# Patient Record
Sex: FEMALE | Race: BLACK OR AFRICAN AMERICAN | Employment: UNEMPLOYED | URBAN - METROPOLITAN AREA
[De-identification: names, ages, dates, MRNs, and addresses within clinical notes are randomized per-mention and may not be internally consistent; named-entity substitution may affect disease eponyms.]

---

## 2017-01-11 ENCOUNTER — TELEPHONE (OUTPATIENT)
Dept: NEUROLOGY | Age: 42
End: 2017-01-11

## 2017-01-13 RX ORDER — ZOLPIDEM TARTRATE 5 MG/1
5 TABLET ORAL
Qty: 30 TAB | Refills: 1 | Status: SHIPPED | OUTPATIENT
Start: 2017-01-13 | End: 2017-03-20 | Stop reason: SDUPTHER

## 2017-02-20 ENCOUNTER — OFFICE VISIT (OUTPATIENT)
Dept: NEUROLOGY | Age: 42
End: 2017-02-20

## 2017-02-20 VITALS
DIASTOLIC BLOOD PRESSURE: 70 MMHG | SYSTOLIC BLOOD PRESSURE: 130 MMHG | OXYGEN SATURATION: 98 % | RESPIRATION RATE: 18 BRPM | HEART RATE: 80 BPM

## 2017-02-20 DIAGNOSIS — G43.709 CHRONIC MIGRAINE W/O AURA W/O STATUS MIGRAINOSUS, NOT INTRACTABLE: Primary | ICD-10-CM

## 2017-02-20 DIAGNOSIS — N95.1 PERIMENOPAUSAL SYMPTOMS: ICD-10-CM

## 2017-02-20 RX ORDER — ONDANSETRON 4 MG/1
4 TABLET, ORALLY DISINTEGRATING ORAL
Qty: 30 TAB | Refills: 1 | Status: SHIPPED | OUTPATIENT
Start: 2017-02-20 | End: 2018-09-10 | Stop reason: ALTCHOICE

## 2017-02-20 RX ORDER — NORTRIPTYLINE HYDROCHLORIDE 25 MG/1
50 CAPSULE ORAL
Qty: 60 CAP | Refills: 5 | Status: SHIPPED | OUTPATIENT
Start: 2017-02-20 | End: 2017-06-05 | Stop reason: SDUPTHER

## 2017-02-20 RX ORDER — SUMATRIPTAN 50 MG/1
50 TABLET, FILM COATED ORAL
Qty: 9 TAB | Refills: 3 | Status: SHIPPED | OUTPATIENT
Start: 2017-02-20 | End: 2018-09-10 | Stop reason: ALTCHOICE

## 2017-02-20 NOTE — PROGRESS NOTES
Chief Complaint   Patient presents with    Headache       HPI    78-year-old woman with chronic migraine and perimenopausal headaches here to follow-up. Since her last visit she has been more compliant with nortriptyline 25 mg but still is waking up in the middle the night. Headaches are less intense but still very prominent around her cycle. She has to take Naprosyn daily during her menstrual cycle which is about 5 days. Review of Systems   Neurological: Positive for headaches. Psychiatric/Behavioral: The patient has insomnia. All other systems reviewed and are negative. Past Medical History   Diagnosis Date    Anxiety disorder     Arthritis     Carpal tunnel syndrome on both sides 2009    Headache     Migraines      Family History   Problem Relation Age of Onset    Stroke Father     Migraines Maternal Grandmother     Headache Maternal Grandmother      Social History     Social History    Marital status:      Spouse name: N/A    Number of children: N/A    Years of education: N/A     Occupational History    Not on file. Social History Main Topics    Smoking status: Current Some Day Smoker     Packs/day: 0.50    Smokeless tobacco: Not on file    Alcohol use No    Drug use: No    Sexual activity: Yes     Birth control/ protection: Injection     Other Topics Concern    Not on file     Social History Narrative     No Known Allergies      Current Outpatient Prescriptions   Medication Sig    nortriptyline (PAMELOR) 25 mg capsule Take 2 Caps by mouth nightly. Take in evening    SUMAtriptan (IMITREX) 50 mg tablet Take 1 Tab by mouth once as needed for Migraine for up to 1 dose. AT ONSET OF SEVERE HEADACHE    ondansetron (ZOFRAN ODT) 4 mg disintegrating tablet Take 1 Tab by mouth every eight (8) hours as needed for Nausea (with headache).  TALE WITH IMITREX    SUMAtriptan succinate (ZEMBRACE SYMTOUCH) 3 mg/0.5 mL pnij 1 Units by SubCUTAneous route once as needed for up to 1 dose.  zolpidem (AMBIEN) 5 mg tablet Take 1 Tab by mouth nightly as needed for Sleep. Max Daily Amount: 5 mg.  naproxen (NAPROSYN) 500 mg tablet Take 1 Tab by mouth daily as needed. TAKE FOR 5 DAYS DURING MENSTRUAL CYCLE     No current facility-administered medications for this visit. Neurologic Exam     Mental Status        WD/WN adult in NAD, normal grooming  VSS  A&O x 3    PERRL, nonicteric  Face is symmetric, tongue midline  Speech is fluent and clear  No limb ataxia. No abnl movements. Moving all extemities spontaneously and symmetric  Normal gait    CVS RRR  Lungs nonlabored  Skin is warm and dry         Visit Vitals    /70    Pulse 80    Resp 18    SpO2 98%       Assessment and Plan   Gennaro Xie was seen today for headache. Diagnoses and all orders for this visit:    Chronic migraine w/o aura w/o status migrainosus, not intractable    Perimenopausal symptoms    Other orders  -     nortriptyline (PAMELOR) 25 mg capsule; Take 2 Caps by mouth nightly. Take in evening  -     SUMAtriptan (IMITREX) 50 mg tablet; Take 1 Tab by mouth once as needed for Migraine for up to 1 dose. AT ONSET OF SEVERE HEADACHE  -     ondansetron (ZOFRAN ODT) 4 mg disintegrating tablet; Take 1 Tab by mouth every eight (8) hours as needed for Nausea (with headache). TALE WITH IMITREX  -     SUMAtriptan succinate (ZEMBRACE SYMTOUCH) 3 mg/0.5 mL pnij; 1 Units by SubCUTAneous route once as needed for up to 1 dose. 68-year-old woman with chronic migraine headache. Still having poorly sustained sleep. We will increase nortriptyline to 50 mg as a trial.  Continue Imitrex but we will add subcutaneous version for quicker benefit. She did not tolerate magnesium due to rash. That has been discontinued. Follow-up in 3 months.       [address] [facility], [address]  Diplomate ROSSYN

## 2017-02-20 NOTE — COMMUNICATION BODY
Chief Complaint   Patient presents with    Headache       HPI    76-year-old woman with chronic migraine and perimenopausal headaches here to follow-up. Since her last visit she has been more compliant with nortriptyline 25 mg but still is waking up in the middle the night. Headaches are less intense but still very prominent around her cycle. She has to take Naprosyn daily during her menstrual cycle which is about 5 days. Review of Systems   Neurological: Positive for headaches. Psychiatric/Behavioral: The patient has insomnia. All other systems reviewed and are negative. Past Medical History   Diagnosis Date    Anxiety disorder     Arthritis     Carpal tunnel syndrome on both sides 2009    Headache     Migraines      Family History   Problem Relation Age of Onset    Stroke Father     Migraines Maternal Grandmother     Headache Maternal Grandmother      Social History     Social History    Marital status:      Spouse name: N/A    Number of children: N/A    Years of education: N/A     Occupational History    Not on file. Social History Main Topics    Smoking status: Current Some Day Smoker     Packs/day: 0.50    Smokeless tobacco: Not on file    Alcohol use No    Drug use: No    Sexual activity: Yes     Birth control/ protection: Injection     Other Topics Concern    Not on file     Social History Narrative     No Known Allergies      Current Outpatient Prescriptions   Medication Sig    nortriptyline (PAMELOR) 25 mg capsule Take 2 Caps by mouth nightly. Take in evening    SUMAtriptan (IMITREX) 50 mg tablet Take 1 Tab by mouth once as needed for Migraine for up to 1 dose. AT ONSET OF SEVERE HEADACHE    ondansetron (ZOFRAN ODT) 4 mg disintegrating tablet Take 1 Tab by mouth every eight (8) hours as needed for Nausea (with headache).  TALE WITH IMITREX    SUMAtriptan succinate (ZEMBRACE SYMTOUCH) 3 mg/0.5 mL pnij 1 Units by SubCUTAneous route once as needed for up to 1 dose.  zolpidem (AMBIEN) 5 mg tablet Take 1 Tab by mouth nightly as needed for Sleep. Max Daily Amount: 5 mg.  naproxen (NAPROSYN) 500 mg tablet Take 1 Tab by mouth daily as needed. TAKE FOR 5 DAYS DURING MENSTRUAL CYCLE     No current facility-administered medications for this visit. Neurologic Exam     Mental Status        WD/WN adult in NAD, normal grooming  VSS  A&O x 3    PERRL, nonicteric  Face is symmetric, tongue midline  Speech is fluent and clear  No limb ataxia. No abnl movements. Moving all extemities spontaneously and symmetric  Normal gait    CVS RRR  Lungs nonlabored  Skin is warm and dry         Visit Vitals    /70    Pulse 80    Resp 18    SpO2 98%       Assessment and Plan   Gina Floor was seen today for headache. Diagnoses and all orders for this visit:    Chronic migraine w/o aura w/o status migrainosus, not intractable    Perimenopausal symptoms    Other orders  -     nortriptyline (PAMELOR) 25 mg capsule; Take 2 Caps by mouth nightly. Take in evening  -     SUMAtriptan (IMITREX) 50 mg tablet; Take 1 Tab by mouth once as needed for Migraine for up to 1 dose. AT ONSET OF SEVERE HEADACHE  -     ondansetron (ZOFRAN ODT) 4 mg disintegrating tablet; Take 1 Tab by mouth every eight (8) hours as needed for Nausea (with headache). TALE WITH IMITREX  -     SUMAtriptan succinate (ZEMBRACE SYMTOUCH) 3 mg/0.5 mL pnij; 1 Units by SubCUTAneous route once as needed for up to 1 dose. 68-year-old woman with chronic migraine headache. Still having poorly sustained sleep. We will increase nortriptyline to 50 mg as a trial.  Continue Imitrex but we will add subcutaneous version for quicker benefit. She did not tolerate magnesium due to rash. That has been discontinued. Follow-up in 3 months.       812 [facility], [address]  Diplomate ROSSYN

## 2017-02-20 NOTE — LETTER
2/20/2017 Patient:  [facility] YOB: 1975 Date of Visit: 2/20/2017 Dear Gerhardt Anthony, [address] [place] [address] [employer] [id number] VIA Facsimile: [phone] [place]anes I was requested by Gerhardt Anthony, PA to evaluate Ms. [facility]  for Chief Complaint Patient presents with  
 Headache Flavia Gary I am recommending the following:  
 
Veronica Kelly was seen today for headache. Diagnoses and all orders for this visit: 
 
Chronic migraine w/o aura w/o status migrainosus, not intractable Perimenopausal symptoms Other orders 
-     nortriptyline (PAMELOR) 25 mg capsule; Take 2 Caps by mouth nightly. Take in evening 
-     SUMAtriptan (IMITREX) 50 mg tablet; Take 1 Tab by mouth once as needed for Migraine for up to 1 dose. AT ONSET OF SEVERE HEADACHE 
-     ondansetron (ZOFRAN ODT) 4 mg disintegrating tablet; Take 1 Tab by mouth every eight (8) hours as needed for Nausea (with headache). TALE WITH IMITREX 
-     SUMAtriptan succinate (ZEMBRACE SYMTOUCH) 3 mg/0.5 mL pnij; 1 Units by SubCUTAneous route once as needed for up to 1 dose. 
 
 
 
---------------------------------------------------------------------------------------------------------------------- Below is my encounter: Chief Complaint Patient presents with  
 Headache HPI 
 
39-year-old woman with chronic migraine and perimenopausal headaches here to follow-up. Since her last visit she has been more compliant with nortriptyline 25 mg but still is waking up in the middle the night. Headaches are less intense but still very prominent around her cycle. She has to take Naprosyn daily during her menstrual cycle which is about 5 days. Review of Systems Neurological: Positive for headaches. Psychiatric/Behavioral: The patient has insomnia. All other systems reviewed and are negative. Past Medical History Diagnosis Date  Anxiety disorder  Arthritis  Carpal tunnel syndrome on both sides 2009  
 Headache  Migraines Family History Problem Relation Age of Onset  Stroke Father  Migraines Maternal Grandmother  Headache Maternal Grandmother Social History Social History  Marital status:  Spouse name: N/A  
 Number of children: N/A  
 Years of education: N/A Occupational History  Not on file. Social History Main Topics  Smoking status: Current Some Day Smoker Packs/day: 0.50  Smokeless tobacco: Not on file  Alcohol use No  
 Drug use: No  
 Sexual activity: Yes Birth control/ protection: Injection Other Topics Concern  Not on file Social History Narrative No Known Allergies Current Outpatient Prescriptions Medication Sig  
 nortriptyline (PAMELOR) 25 mg capsule Take 2 Caps by mouth nightly. Take in evening  SUMAtriptan (IMITREX) 50 mg tablet Take 1 Tab by mouth once as needed for Migraine for up to 1 dose. AT ONSET OF SEVERE HEADACHE  ondansetron (ZOFRAN ODT) 4 mg disintegrating tablet Take 1 Tab by mouth every eight (8) hours as needed for Nausea (with headache). TALE WITH IMITREX  
 SUMAtriptan succinate (ZEMBRACE SYMTOUCH) 3 mg/0.5 mL pnij 1 Units by SubCUTAneous route once as needed for up to 1 dose.  zolpidem (AMBIEN) 5 mg tablet Take 1 Tab by mouth nightly as needed for Sleep. Max Daily Amount: 5 mg.  naproxen (NAPROSYN) 500 mg tablet Take 1 Tab by mouth daily as needed. TAKE FOR 5 DAYS DURING MENSTRUAL CYCLE No current facility-administered medications for this visit. Neurologic Exam  
 
Mental Status WD/WN adult in NAD, normal grooming VSS 
A&O x 3 PERRL, nonicteric Face is symmetric, tongue midline Speech is fluent and clear No limb ataxia. No abnl movements. Moving all extemities spontaneously and symmetric Normal gait CVS RRR Lungs nonlabored Skin is warm and dry Visit Vitals  /70  Pulse 80  Resp 18  SpO2 98% Assessment and Plan Susan Taylor was seen today for headache. Diagnoses and all orders for this visit: 
 
Chronic migraine w/o aura w/o status migrainosus, not intractable Perimenopausal symptoms Other orders 
-     nortriptyline (PAMELOR) 25 mg capsule; Take 2 Caps by mouth nightly. Take in evening 
-     SUMAtriptan (IMITREX) 50 mg tablet; Take 1 Tab by mouth once as needed for Migraine for up to 1 dose. AT ONSET OF SEVERE HEADACHE 
-     ondansetron (ZOFRAN ODT) 4 mg disintegrating tablet; Take 1 Tab by mouth every eight (8) hours as needed for Nausea (with headache). TALE WITH IMITREX 
-     SUMAtriptan succinate (ZEMBRACE SYMTOUCH) 3 mg/0.5 mL pnij; 1 Units by SubCUTAneous route once as needed for up to 1 dose. 66-year-old woman with chronic migraine headache. Still having poorly sustained sleep. We will increase nortriptyline to 50 mg as a trial.  Continue Imitrex but we will add subcutaneous version for quicker benefit. She did not tolerate magnesium due to rash. That has been discontinued. Follow-up in 3 months. Thank you for giving me the opportunity to assist in the care of Ms. Cardoza. If you have questions, please do not hesitate to contact me. Sincerely, [address] [facility], DO Neurologist 
Diplomate VAHID

## 2017-02-20 NOTE — MR AVS SNAPSHOT
Visit Information Date & Time Provider Department Dept. Phone Encounter #  
 2/20/2017 10:00  [facility] at [facility] [id number] Follow-up Instructions Return in about 3 months (around 5/20/2017). Upcoming Health Maintenance Date Due Pneumococcal 19-64 Medium Risk (1 of 1 - PPSV23) 3/11/1992 DTaP/Tdap/Td series (1 - Tdap) 3/11/1994 PAP AKA CERVICAL CYTOLOGY 3/11/1994 INFLUENZA AGE 9 TO ADULT 8/1/2016 Allergies as of 2/20/2017  Review Complete On: 2/20/2017 By: Ramesh Taylor LPN No Known Allergies Current Immunizations  Never Reviewed No immunizations on file. Not reviewed this visit You Were Diagnosed With   
  
 Codes Comments Chronic migraine w/o aura w/o status migrainosus, not intractable    -  Primary ICD-10-CM: Y70.968 ICD-9-CM: 346.70 Perimenopausal symptoms     ICD-10-CM: N95.1 ICD-9-CM: 627.2 Vitals BP Pulse Resp SpO2 Smoking Status 130/70 80 18 98% Current Some Day Smoker Preferred Pharmacy Pharmacy Name Phone [id number], [id number] [place] [address] [phone] Your Updated Medication List  
  
   
This list is accurate as of: 2/20/17 10:15 AM.  Always use your most recent med list.  
  
  
  
  
 naproxen 500 mg tablet Commonly known as:  NAPROSYN Take 1 Tab by mouth daily as needed. TAKE FOR 5 DAYS DURING MENSTRUAL CYCLE  
  
 nortriptyline 25 mg capsule Commonly known as:  PAMELOR Take 2 Caps by mouth nightly. Take in evening  
  
 ondansetron 4 mg disintegrating tablet Commonly known as:  ZOFRAN ODT Take 1 Tab by mouth every eight (8) hours as needed for Nausea (with headache). TALE WITH IMITREX  
  
 * SUMAtriptan 50 mg tablet Commonly known as:  IMITREX Take 1 Tab by mouth once as needed for Migraine for up to 1 dose. AT ONSET OF SEVERE HEADACHE * SUMAtriptan succinate 3 mg/0.5 mL Pnij Commonly known as:  Catalino Clamp  
1 Units by SubCUTAneous route once as needed for up to 1 dose. zolpidem 5 mg tablet Commonly known as:  AMBIEN Take 1 Tab by mouth nightly as needed for Sleep. Max Daily Amount: 5 mg.  
  
 * Notice: This list has 2 medication(s) that are the same as other medications prescribed for you. Read the directions carefully, and ask your doctor or other care provider to review them with you. Prescriptions Printed Refills SUMAtriptan succinate (ZEMBRACE SYMTOUCH) 3 mg/0.5 mL pnij 3 Si Units by SubCUTAneous route once as needed for up to 1 dose. Class: Print Route: SubCUTAneous Prescriptions Sent to Pharmacy Refills  
 nortriptyline (PAMELOR) 25 mg capsule 5 Sig: Take 2 Caps by mouth nightly. Take in evening Class: Normal  
 Pharmacy: [address] [place] [address] [address] [place] [address] Ph #: [phone] Route: Oral  
 SUMAtriptan (IMITREX) 50 mg tablet 3 Sig: Take 1 Tab by mouth once as needed for Migraine for up to 1 dose. AT ONSET OF SEVERE HEADACHE Class: Normal  
 Pharmacy: [address] [place] [address] [address] [place] [address] Ph #: [phone] Route: Oral  
 ondansetron (ZOFRAN ODT) 4 mg disintegrating tablet 1 Sig: Take 1 Tab by mouth every eight (8) hours as needed for Nausea (with headache). [address] Class: Normal  
 Pharmacy: [address] [place] [address] [address] [place] [address] Ph #: [phone] Route: Oral  
  
Follow-up Instructions Return in about 3 months (around 2017). Patient Instructions PRESCRIPTION REFILL POLICY [facility] Statement to Patients 2014 In an effort to ensure the large volume of patient prescription refills is processed in the most efficient and expeditious manner, we are asking our patients to assist us by calling your Pharmacy for all prescription refills, this will include also your  Mail Order Pharmacy. The pharmacy will contact our office electronically to continue the refill process. Please do not wait until the last minute to call your pharmacy. We need at least 48 hours (2days) to fill prescriptions. We also encourage you to call your pharmacy before going to  your prescription to make sure it is ready. With regard to controlled substance prescription refill requests (narcotic refills) that need to be picked up at our office, we ask your cooperation by providing us with at least 72 hours (3days) notice that you will need a refill. We will not refill narcotic prescription refill requests after 4:00pm on any weekday, Monday through Thursday, or after 2:00pm on Fridays, or on the weekends. We encourage everyone to explore another way of getting your prescription refill request processed using [employer], our patient web portal through our electronic medical record system. [employer] is an efficient and effective way to communicate your medication request directly to the office and  downloadable as an andree on your smart phone . [employer] also features a review functionality that allows you to view your medication list as well as leave messages for your physician. Are you ready to get connected? If so please review the attatched instructions or speak to any of our staff to get you set up right away! Thank you so much for your cooperation. Should you have any questions please contact our Practice Administrator. The Physicians and Staff,  [facility] Thank you for choosing [facility] for your  
 
care. You may receive a survey about your visit. We appreciate you taking time  
 
to complete this survey as we use your feedback to improve our services.   We  
 
 realize we are not perfect, but we strive to provide excellent care. Please provide this summary of care documentation to your next provider. Your primary care clinician is listed as Valdez Freedman. If you have any questions after today's visit, please call [phone].

## 2017-02-20 NOTE — PATIENT INSTRUCTIONS
10 [facility]   Statement to Patients  April 1, 2014      In an effort to ensure the large volume of patient prescription refills is processed in the most efficient and expeditious manner, we are asking our patients to assist us by calling your Pharmacy for all prescription refills, this will include also your  Mail Order Pharmacy. The pharmacy will contact our office electronically to continue the refill process. Please do not wait until the last minute to call your pharmacy. We need at least 48 hours (2days) to fill prescriptions. We also encourage you to call your pharmacy before going to  your prescription to make sure it is ready. With regard to controlled substance prescription refill requests (narcotic refills) that need to be picked up at our office, we ask your cooperation by providing us with at least 72 hours (3days) notice that you will need a refill. We will not refill narcotic prescription refill requests after 4:00pm on any weekday, Monday through Thursday, or after 2:00pm on Fridays, or on the weekends. We encourage everyone to explore another way of getting your prescription refill request processed using [employer], our patient web portal through our electronic medical record system. [employer] is an efficient and effective way to communicate your medication request directly to the office and  downloadable as an andree on your smart phone . [employer] also features a review functionality that allows you to view your medication list as well as leave messages for your physician. Are you ready to get connected? If so please review the attatched instructions or speak to any of our staff to get you set up right away! Thank you so much for your cooperation. Should you have any questions please contact our Practice Administrator.     The Physicians and Staff,  [facility]     Thank you for choosing [facility] for your     care. You may receive a survey about your visit. We appreciate you taking time     to complete this survey as we use your feedback to improve our services. We     realize we are not perfect, but we strive to provide excellent care.

## 2017-03-21 RX ORDER — ZOLPIDEM TARTRATE 5 MG/1
TABLET ORAL
Qty: 30 TAB | Refills: 1 | Status: SHIPPED | OUTPATIENT
Start: 2017-03-21 | End: 2018-09-10 | Stop reason: ALTCHOICE

## 2017-06-05 ENCOUNTER — OFFICE VISIT (OUTPATIENT)
Dept: NEUROLOGY | Age: 42
End: 2017-06-05

## 2017-06-05 VITALS
OXYGEN SATURATION: 98 % | SYSTOLIC BLOOD PRESSURE: 138 MMHG | RESPIRATION RATE: 18 BRPM | WEIGHT: 128 LBS | BODY MASS INDEX: 25 KG/M2 | DIASTOLIC BLOOD PRESSURE: 72 MMHG | HEART RATE: 82 BPM

## 2017-06-05 DIAGNOSIS — F32.9 REACTIVE DEPRESSION (SITUATIONAL): ICD-10-CM

## 2017-06-05 DIAGNOSIS — G43.709 CHRONIC MIGRAINE W/O AURA W/O STATUS MIGRAINOSUS, NOT INTRACTABLE: Primary | ICD-10-CM

## 2017-06-05 RX ORDER — LORAZEPAM 0.5 MG/1
0.5 TABLET ORAL
Qty: 20 TAB | Refills: 0 | Status: SHIPPED | OUTPATIENT
Start: 2017-06-05 | End: 2018-09-10 | Stop reason: ALTCHOICE

## 2017-06-05 RX ORDER — NORTRIPTYLINE HYDROCHLORIDE 50 MG/1
50 CAPSULE ORAL
Qty: 30 CAP | Refills: 3 | Status: SHIPPED | OUTPATIENT
Start: 2017-06-05 | End: 2018-08-13

## 2017-06-05 NOTE — MR AVS SNAPSHOT
Visit Information Date & Time Provider Department Dept. Phone Encounter #  
 6/5/2017  1:15  [facility], [address] [facility] at [address]  Upcoming Health Maintenance Date Due Pneumococcal 19-64 Medium Risk (1 of 1 - PPSV23) 3/11/1992 DTaP/Tdap/Td series (1 - Tdap) 3/11/1994 PAP AKA CERVICAL CYTOLOGY 3/11/1994 INFLUENZA AGE 9 TO ADULT 8/1/2017 Allergies as of 6/5/2017  Review Complete On: 2/20/2017 By: [address] [facility], DO No Known Allergies Current Immunizations  Never Reviewed No immunizations on file. Not reviewed this visit You Were Diagnosed With   
  
 Codes Comments Chronic migraine w/o aura w/o status migrainosus, not intractable    -  Primary ICD-10-CM: L99.829 ICD-9-CM: 346.70 Reactive depression (situational)     ICD-10-CM: F32.9 ICD-9-CM: 300.4 Vitals BP Pulse Resp Weight(growth percentile) SpO2 BMI  
 138/72 82 18 128 lb (58.1 kg) 98% 25 kg/m2 Smoking Status Current Some Day Smoker BMI and BSA Data Body Mass Index Body Surface Area  
 25 kg/m 2 1.57 m 2 Preferred Pharmacy Pharmacy Name Phone [id number], [place] [address] [address] [address] [phone] Your Updated Medication List  
  
   
This list is accurate as of: 6/5/17  1:58 PM.  Always use your most recent med list.  
  
  
  
  
 LORazepam 0.5 mg tablet Commonly known as:  ATIVAN Take 1 Tab by mouth nightly as needed for Anxiety. Max Daily Amount: 0.5 mg.  
  
 naproxen 500 mg tablet Commonly known as:  NAPROSYN Take 1 Tab by mouth daily as needed. TAKE FOR 5 DAYS DURING MENSTRUAL CYCLE  
  
 nortriptyline 50 mg capsule Commonly known as:  PAMELOR Take 1 Cap by mouth nightly. Take in evening  
  
 ondansetron 4 mg disintegrating tablet Commonly known as:  ZOFRAN ODT  
 Take 1 Tab by mouth every eight (8) hours as needed for Nausea (with headache). TALE WITH IMITREX  
  
 * SUMAtriptan 50 mg tablet Commonly known as:  IMITREX Take 1 Tab by mouth once as needed for Migraine for up to 1 dose. AT ONSET OF SEVERE HEADACHE * SUMAtriptan succinate 3 mg/0.5 mL Pnij Commonly known as:  Duey Massed  
1 Units by SubCUTAneous route once as needed for up to 1 dose. * SUMAtriptan succinate 3 mg/0.5 mL Pnij Commonly known as:  Duey Massed  
3 mg by SubCUTAneous route as needed. zolpidem 5 mg tablet Commonly known as:  AMBIEN  
TAKE 1 TABLET BY MOUTH NIGHTLY AT BEDTIME AS NEEDED FOR SLEEP, *MAX DAILY AMOUNT: 5MG*  
  
 * Notice: This list has 3 medication(s) that are the same as other medications prescribed for you. Read the directions carefully, and ask your doctor or other care provider to review them with you. Prescriptions Printed Refills LORazepam (ATIVAN) 0.5 mg tablet 0 Sig: Take 1 Tab by mouth nightly as needed for Anxiety. Max Daily Amount: 0.5 mg.  
 Class: Print Route: Oral  
  
Prescriptions Sent to Pharmacy Refills  
 nortriptyline (PAMELOR) 50 mg capsule 3 Sig: Take 1 Cap by mouth nightly. Take in evening Class: Normal  
 Pharmacy: [address] [place] [address] , [address] [place] [address] Ph #: [phone] Route: Oral  
  
Patient Instructions A Healthy Lifestyle: Care Instructions Your Care Instructions A healthy lifestyle can help you feel good, stay at a healthy weight, and have plenty of energy for both work and play. A healthy lifestyle is something you can share with your whole family. A healthy lifestyle also can lower your risk for serious health problems, such as high blood pressure, heart disease, and diabetes. You can follow a few steps listed below to improve your health and the health of your family. Follow-up care is a key part of your treatment and safety. Be sure to make and go to all appointments, and call your doctor if you are having problems. Its also a good idea to know your test results and keep a list of the medicines you take. How can you care for yourself at home? · Do not eat too much sugar, fat, or fast foods. You can still have dessert and treats now and then. The goal is moderation. · Start small to improve your eating habits. Pay attention to portion sizes, drink less juice and soda pop, and eat more fruits and vegetables. ¨ Eat a healthy amount of food. A 3-ounce serving of meat, for example, is about the size of a deck of cards. Fill the rest of your plate with vegetables and whole grains. ¨ Limit the amount of soda and sports drinks you have every day. Drink more water when you are thirsty. ¨ Eat at least 5 servings of fruits and vegetables every day. It may seem like a lot, but it is not hard to reach this goal. A serving or helping is 1 piece of fruit, 1 cup of vegetables, or 2 cups of leafy, raw vegetables. Have an apple or some carrot sticks as an afternoon snack instead of a candy bar. Try to have fruits and/or vegetables at every meal. 
· Make exercise part of your daily routine. You may want to start with simple activities, such as walking, bicycling, or slow swimming. Try to be active 30 to 60 minutes every day. You do not need to do all 30 to 60 minutes all at once. For example, you can exercise 3 times a day for 10 or 20 minutes. Moderate exercise is safe for most people, but it is always a good idea to talk to your doctor before starting an exercise program. 
· Keep moving. Concha Dung the lawn, work in the garden, or [employer]. Take the stairs instead of the elevator at work. · If you smoke, quit. People who smoke have an increased risk for heart attack, stroke, cancer, and other lung illnesses.  Quitting is hard, but there are ways to boost your chance of quitting tobacco for good. ¨ Use nicotine gum, patches, or lozenges. ¨ Ask your doctor about stop-smoking programs and medicines. ¨ Keep trying. In addition to reducing your risk of diseases in the future, you will notice some benefits soon after you stop using tobacco. If you have shortness of breath or asthma symptoms, they will likely get better within a few weeks after you quit. · Limit how much alcohol you drink. Moderate amounts of alcohol (up to 2 drinks a day for men, 1 drink a day for women) are okay. But drinking too much can lead to liver problems, high blood pressure, and other health problems. Family health If you have a family, there are many things you can do together to improve your health. · Eat meals together as a family as often as possible. · Eat healthy foods. This includes fruits, vegetables, lean meats and dairy, and whole grains. · Include your family in your fitness plan. Most people think of activities such as jogging or tennis as the way to fitness, but there are many ways you and your family can be more active. Anything that makes you breathe hard and gets your heart pumping is exercise. Here are some tips: 
¨ Walk to do errands or to take your child to school or the bus. ¨ Go for a family bike ride after dinner instead of watching TV. Where can you learn more? Go to http://masha-briseyda.info/. Enter Z020 in the search box to learn more about \"A Healthy Lifestyle: Care Instructions. \" Current as of: July 26, 2016 Content Version: 11.2 © 6245-6163 [employer]. Care instructions adapted under license by [employer] (which disclaims liability or warranty for this information). If you have questions about a medical condition or this instruction, always ask your healthcare professional. [address] any warranty or liability for your use of this information. Introducing [facility]! [facility] introduces [employer] patient portal. Now you can access parts of your medical record, email your doctor's office, and request medication refills online. 1. In your internet browser, go to [email or web address] 2. Click on the First Time User? Click Here link in the Sign In box. You will see the New Member Sign Up page. 3. Enter your [employer] Access Code exactly as it appears below. You will not need to use this code after youve completed the sign-up process. If you do not sign up before the expiration date, you must request a new code. · [employer] Access Code: [email or web address] Expires: 9/3/2017  1:18 PM 
 
4. Enter the last four digits of your Social Security Number (xxxx) and Date of Birth (mm/dd/yyyy) as indicated and click Submit. You will be taken to the next sign-up page. 5. Create a [employer] ID. This will be your [employer] login ID and cannot be changed, so think of one that is secure and easy to remember. 6. Create a [employer] password. You can change your password at any time. 7. Enter your Password Reset Question and Answer. This can be used at a later time if you forget your password. 8. Enter your e-mail address. You will receive e-mail notification when new information is available in [address] 9. Click Sign Up. You can now view and download portions of your medical record. 10. Click the Download Summary menu link to download a portable copy of your medical information. If you have questions, please visit the Frequently Asked Questions section of the [employer] website. Remember, [employer] is NOT to be used for urgent needs. For medical emergencies, dial 911. Now available from your iPhone and Android! Please provide this summary of care documentation to your next provider. Your primary care clinician is listed as Natalee Lilly. If you have any questions after today's visit, please call [phone].

## 2017-06-05 NOTE — PROGRESS NOTES
Chief Complaint   Patient presents with    Headache       HPI    Ms. Blair Zaragoza is a 64-year-old woman here to follow-up on chronic migraine. Last visit she was responding to nortriptyline very adequately. Unfortunately since her last visit she was diagnosed with HSV-1 and HSV-2. She is very upset over this. Having a lot of depression and guilt over this. As a result her headaches have slightly increased in intensity and flare. She used sumatriptan injection with good relief. Review of Systems   Neurological: Positive for headaches. Psychiatric/Behavioral: Positive for depression. The patient is nervous/anxious and has insomnia. All other systems reviewed and are negative. Past Medical History:   Diagnosis Date    Anxiety disorder     Arthritis     Carpal tunnel syndrome on both sides 2009    Headache     Migraines      Family History   Problem Relation Age of Onset    Stroke Father     Migraines Maternal Grandmother     Headache Maternal Grandmother      Social History     Social History    Marital status:      Spouse name: N/A    Number of children: N/A    Years of education: N/A     Occupational History    Not on file. Social History Main Topics    Smoking status: Current Some Day Smoker     Packs/day: 0.50    Smokeless tobacco: Not on file    Alcohol use No    Drug use: No    Sexual activity: Yes     Birth control/ protection: Injection     Other Topics Concern    Not on file     Social History Narrative     No Known Allergies      Current Outpatient Prescriptions   Medication Sig    LORazepam (ATIVAN) 0.5 mg tablet Take 1 Tab by mouth nightly as needed for Anxiety. Max Daily Amount: 0.5 mg.    nortriptyline (PAMELOR) 50 mg capsule Take 1 Cap by mouth nightly. Take in evening    SUMAtriptan succinate (ZEMBRACE SYMTOUCH) 3 mg/0.5 mL pnij 3 mg by SubCUTAneous route as needed.     zolpidem (AMBIEN) 5 mg tablet TAKE 1 TABLET BY MOUTH NIGHTLY AT BEDTIME AS NEEDED FOR SLEEP, *MAX DAILY AMOUNT: 5MG*    naproxen (NAPROSYN) 500 mg tablet Take 1 Tab by mouth daily as needed. TAKE FOR 5 DAYS DURING MENSTRUAL CYCLE    SUMAtriptan (IMITREX) 50 mg tablet Take 1 Tab by mouth once as needed for Migraine for up to 1 dose. AT ONSET OF SEVERE HEADACHE    ondansetron (ZOFRAN ODT) 4 mg disintegrating tablet Take 1 Tab by mouth every eight (8) hours as needed for Nausea (with headache). TALE WITH IMITREX    SUMAtriptan succinate (ZEMBRACE SYMTOUCH) 3 mg/0.5 mL pnij 1 Units by SubCUTAneous route once as needed for up to 1 dose. No current facility-administered medications for this visit. Neurologic Exam     Mental Status        WD/WN adult in NAD, normal grooming  VSS  A&O x 3. Tearful,     PERRL, nonicteric  Face is symmetric, tongue midline  Speech is fluent and clear  No limb ataxia. No abnl movements. Moving all extemities spontaneously and symmetric  Normal gait    CVS RRR  Lungs nonlabored  Skin is warm and dry         Visit Vitals    /72    Pulse 82    Resp 18    Wt 58.1 kg (128 lb)    SpO2 98%    BMI 25 kg/m2       Assessment and Plan   Namrata Zafar was seen today for headache. Diagnoses and all orders for this visit:    Chronic migraine w/o aura w/o status migrainosus, not intractable    Reactive depression (situational)    Other orders  -     LORazepam (ATIVAN) 0.5 mg tablet; Take 1 Tab by mouth nightly as needed for Anxiety. Max Daily Amount: 0.5 mg.  -     nortriptyline (PAMELOR) 50 mg capsule; Take 1 Cap by mouth nightly. Take in evening     49-year-old woman with chronic migraine who has been responding to nortriptyline. Unfortunately she is having an increase in headaches probably in the setting of reactive depression due to her recent diagnosis of HSV 1 and 2. I am going to give her a short course of Ativan for this difficult  Time. Not for daily use. She understands this. Stop using Ambien since it was not working. Continue nortriptyline.   She is not symptomatic currently regarding the HSV. Follow-up in 3 months or sooner as needed.           [facility] [address]  Diplomate ABPN

## 2017-06-05 NOTE — PATIENT INSTRUCTIONS

## 2018-08-13 ENCOUNTER — OFFICE VISIT (OUTPATIENT)
Dept: INTERNAL MEDICINE CLINIC | Age: 43
End: 2018-08-13

## 2018-08-13 VITALS
SYSTOLIC BLOOD PRESSURE: 120 MMHG | HEIGHT: 59 IN | OXYGEN SATURATION: 100 % | TEMPERATURE: 96 F | DIASTOLIC BLOOD PRESSURE: 85 MMHG | WEIGHT: 131.2 LBS | BODY MASS INDEX: 26.45 KG/M2 | RESPIRATION RATE: 18 BRPM | HEART RATE: 83 BPM

## 2018-08-13 DIAGNOSIS — Z76.89 ENCOUNTER TO ESTABLISH CARE WITH NEW DOCTOR: ICD-10-CM

## 2018-08-13 DIAGNOSIS — F32.A ANXIETY AND DEPRESSION: Primary | ICD-10-CM

## 2018-08-13 DIAGNOSIS — F41.9 ANXIETY AND DEPRESSION: Primary | ICD-10-CM

## 2018-08-13 RX ORDER — ESCITALOPRAM OXALATE 10 MG/1
10 TABLET ORAL DAILY
Qty: 30 TAB | Refills: 2 | Status: SHIPPED | OUTPATIENT
Start: 2018-08-13 | End: 2018-10-15 | Stop reason: SDUPTHER

## 2018-08-13 RX ORDER — ASPIRIN 325 MG
325 TABLET ORAL DAILY
COMMUNITY
End: 2021-11-29

## 2018-08-13 RX ORDER — BISMUTH SUBSALICYLATE 262 MG
1 TABLET,CHEWABLE ORAL DAILY
COMMUNITY

## 2018-08-13 NOTE — PROGRESS NOTES
Subjective:     Chief Complaint   Patient presents with    Anxiety     work, home, diagnosed years ago. Stopped medication, getting worst last 3 months        She  is a 37y.o. year old female who presents today with a c/o anxiety for the past three months. She reports that she was diagnosed with anxiety years ago and she took some xanax for a while then she never had to take anything for a while. Reports that the symptoms came back three months ago with symptoms of palpitation, worry and random things, cannot sleep at night. She did not recognize any specific triggers but her work schedule is very busy which may have bee triggering her symptoms. She also mention that her period is not regular as it was. States that anxiety and depression gets worse before before her period. She does have FHx of early menopause she states. A comprehensive review of systems was negative except for that written in the HPI.   Objective:     Vitals:    08/13/18 1536   BP: (!) 140/91   Pulse: 83   Resp: 18   Temp: 96 °F (35.6 °C)   TempSrc: Temporal   SpO2: 100%   Weight: 131 lb 3.2 oz (59.5 kg)   Height: 4' 11\" (1.499 m)       Physical Examination: General appearance - alert, well appearing, and in no distress, oriented to person, place, and time and normal appearing weight  Mental status - alert, oriented to person, place, and time, normal mood, behavior, speech, dress, motor activity, and thought processes  Ears - bilateral TM's and external ear canals normal  Nose - normal and patent, no erythema, discharge or polyps  Mouth - mucous membranes moist, pharynx normal without lesions  Neck - supple, no significant adenopathy, thyroid exam: thyroid is normal in size without nodules or tenderness  Chest - clear to auscultation, no wheezes, rales or rhonchi, symmetric air entry  Heart - normal rate, regular rhythm, normal S1, S2, no murmurs, rubs, clicks or gallops  Neurological - alert, oriented, normal speech, no focal findings or movement disorder noted  Musculoskeletal - no joint tenderness, deformity or swelling  Extremities - no pedal edema noted    No Known Allergies   Social History     Social History    Marital status:      Spouse name: N/A    Number of children: N/A    Years of education: N/A     Social History Main Topics    Smoking status: Current Some Day Smoker     Packs/day: 0.50    Smokeless tobacco: Never Used    Alcohol use Yes      Comment: socially    Drug use: No    Sexual activity: Yes     Partners: Male     Birth control/ protection: None     Other Topics Concern    None     Social History Narrative      Family History   Problem Relation Age of Onset    Hypertension Mother     Stroke Father     Diabetes Father     Migraines Maternal Grandmother     Headache Maternal Grandmother       Past Surgical History:   Procedure Laterality Date    HX ORTHOPAEDIC      bilateral CTS procedure 2009      Past Medical History:   Diagnosis Date    Anxiety disorder     Arthritis     Carpal tunnel syndrome on both sides 2009    Headache     Migraines       Current Outpatient Prescriptions   Medication Sig Dispense Refill    multivitamin (ONE A DAY) tablet Take 1 Tab by mouth daily.  aspirin (ASPIRIN) 325 mg tablet Take 325 mg by mouth daily.  LORazepam (ATIVAN) 0.5 mg tablet Take 1 Tab by mouth nightly as needed for Anxiety. Max Daily Amount: 0.5 mg. 20 Tab 0    nortriptyline (PAMELOR) 50 mg capsule Take 1 Cap by mouth nightly. Take in evening 30 Cap 3    SUMAtriptan succinate (ZEMBRACE SYMTOUCH) 3 mg/0.5 mL pnij 3 mg by SubCUTAneous route as needed. 1 Syringe 0    zolpidem (AMBIEN) 5 mg tablet TAKE 1 TABLET BY MOUTH NIGHTLY AT BEDTIME AS NEEDED FOR SLEEP, *MAX DAILY AMOUNT: 5MG* 30 Tab 1    SUMAtriptan (IMITREX) 50 mg tablet Take 1 Tab by mouth once as needed for Migraine for up to 1 dose.  AT ONSET OF SEVERE HEADACHE 9 Tab 3    ondansetron (ZOFRAN ODT) 4 mg disintegrating tablet Take 1 Tab by mouth every eight (8) hours as needed for Nausea (with headache). TALE WITH IMITREX 30 Tab 1    SUMAtriptan succinate (ZEMBRACE SYMTOUCH) 3 mg/0.5 mL pnij 1 Units by SubCUTAneous route once as needed for up to 1 dose. 4 Units 3    naproxen (NAPROSYN) 500 mg tablet Take 1 Tab by mouth daily as needed. TAKE FOR 5 DAYS DURING MENSTRUAL CYCLE 30 Tab 1        Assessment/ Plan:   Diagnoses and all orders for this visit:    1. Anxiety and depression  -   After discussing different alternatives we decided to start   escitalopram oxalate (LEXAPRO) 10 mg tablet; Take 1 Tab by mouth daily.  -     TSH AND FREE T4       - coping skills. 2. Encounter to establish care with new doctor         Medication risks/benefits/costs/interactions/alternatives discussed with patient. Advised patient to call back or return to office if symptoms worsen/change/persist. If patient cannot reach us or should anything more severe/urgent arise he/she should proceed directly to the nearest emergency department. Discussed expected course/resolution/complications of diagnosis in detail with patient. Patient given a written after visit summary which includes her diagnoses, current medications and vitals. Patient expressed understanding with the diagnosis and plan. Follow-up Disposition:  Return for complete physical  and fasting blood work. Juju Gross

## 2018-08-13 NOTE — MR AVS SNAPSHOT
Dionicio Villareal 
 
 
 [address] [place] [address] 
[phone] Patient: [facility] MRN: [id number] VMV:[id number]/[id number] Visit Information Date & Time Provider Department Dept. Phone Encounter #  
 8/13/2018  3:45 PM Naye Curiel MD [facility] Internal Medicine [phone] [id number] Follow-up Instructions Return for complete physical  and fasting blood work. [place] Upcoming Health Maintenance Date Due  
 BREAST CANCER SCRN MAMMOGRAM 3/11/1991 Pneumococcal 19-64 Medium Risk (1 of 1 - PPSV23) 3/11/1992 DTaP/Tdap/Td series (1 - Tdap) 3/11/1994 Influenza Age 5 to Adult 8/1/2018 PAP AKA CERVICAL CYTOLOGY 10/18/2020 Allergies as of 8/13/2018  Review Complete On: 8/13/2018 By: Naey Curiel MD  
 No Known Allergies Current Immunizations  Never Reviewed No immunizations on file. Not reviewed this visit You Were Diagnosed With   
  
 Codes Comments Anxiety and depression    -  Primary ICD-10-CM: F41.9, F32.9 ICD-9-CM: 300.00, 311 Vitals BP Pulse Temp Resp Height(growth percentile) Weight(growth percentile) 120/85 83 96 °F (35.6 °C) (Temporal) 18 4' 11\" (1.499 m) 131 lb 3.2 oz (59.5 kg) LMP SpO2 BMI OB Status Smoking Status 07/01/2018 100% 26.5 kg/m2 Premenopausal Current Some Day Smoker Vitals History BMI and BSA Data Body Mass Index Body Surface Area  
 26.5 kg/m 2 1.57 m 2 Preferred Pharmacy Pharmacy Name Phone [address] [email or web address] [address] , [address] [facility] [phone] Your Updated Medication List  
  
   
This list is accurate as of 8/13/18  4:09 PM.  Always use your most recent med list.  
  
  
  
  
 aspirin 325 mg tablet Commonly known as:  ASPIRIN Take 325 mg by mouth daily. escitalopram oxalate 10 mg tablet Commonly known as:  Rebekah Moots Take 1 Tab by mouth daily. LORazepam 0.5 mg tablet Commonly known as:  ATIVAN  
 Take 1 Tab by mouth nightly as needed for Anxiety. Max Daily Amount: 0.5 mg.  
  
 multivitamin tablet Commonly known as:  ONE A DAY Take 1 Tab by mouth daily. naproxen 500 mg tablet Commonly known as:  NAPROSYN Take 1 Tab by mouth daily as needed. TAKE FOR 5 DAYS DURING MENSTRUAL CYCLE  
  
 ondansetron 4 mg disintegrating tablet Commonly known as:  ZOFRAN ODT Take 1 Tab by mouth every eight (8) hours as needed for Nausea (with headache). TALE WITH IMITREX  
  
 * SUMAtriptan 50 mg tablet Commonly known as:  IMITREX Take 1 Tab by mouth once as needed for Migraine for up to 1 dose. AT ONSET OF SEVERE HEADACHE * SUMAtriptan succinate 3 mg/0.5 mL Pnij Commonly known as:  Harvie Chandu  
1 Units by SubCUTAneous route once as needed for up to 1 dose. * SUMAtriptan succinate 3 mg/0.5 mL Pnij Commonly known as:  Harvie Chandu  
3 mg by SubCUTAneous route as needed. zolpidem 5 mg tablet Commonly known as:  AMBIEN  
TAKE 1 TABLET BY MOUTH NIGHTLY AT BEDTIME AS NEEDED FOR SLEEP, *MAX DAILY AMOUNT: 5MG*  
  
 * Notice: This list has 3 medication(s) that are the same as other medications prescribed for you. Read the directions carefully, and ask your doctor or other care provider to review them with you. Prescriptions Sent to Pharmacy Refills  
 escitalopram oxalate (LEXAPRO) 10 mg tablet 2 Sig: Take 1 Tab by mouth daily. Class: Normal  
 Pharmacy: [facility] [address], [address] [place] [address] Ph #: [phone] Route: Oral  
  
We Performed the Following TSH AND FREE T4 [45397 CPT(R)] Follow-up Instructions Return for complete physical  and fasting blood work. Johnnie Hinojosa Introducing [facility] & HEALTH SERVICES! Spike Chung introduces [employer] patient portal. Now you can access parts of your medical record, email your doctor's office, and request medication refills online.    
 
1. In your internet browser, go to [email or web address] 2. Click on the First Time User? Click Here link in the Sign In box. You will see the New Member Sign Up page. 3. Enter your PostRank Access Code exactly as it appears below. You will not need to use this code after youve completed the sign-up process. If you do not sign up before the expiration date, you must request a new code. · PostRank Access Code: [email or web address] Expires: 11/11/2018  3:38 PM 
 
4. Enter the last four digits of your Social Security Number (xxxx) and Date of Birth (mm/dd/yyyy) as indicated and click Submit. You will be taken to the next sign-up page. 5. Create a Sumavisost ID. This will be your PostRank login ID and cannot be changed, so think of one that is secure and easy to remember. 6. Create a PostRank password. You can change your password at any time. 7. Enter your Password Reset Question and Answer. This can be used at a later time if you forget your password. 8. Enter your e-mail address. You will receive e-mail notification when new information is available in [address] 9. Click Sign Up. You can now view and download portions of your medical record. 10. Click the Download Summary menu link to download a portable copy of your medical information. If you have questions, please visit the Frequently Asked Questions section of the PostRank website. Remember, PostRank is NOT to be used for urgent needs. For medical emergencies, dial 911. Now available from your iPhone and Android! Please provide this summary of care documentation to your next provider. Your primary care clinician is listed as Jose Rafael Ford. If you have any questions after today's visit, please call [phone].

## 2018-08-14 LAB
T4 FREE SERPL-MCNC: 0.97 NG/DL (ref 0.82–1.77)
TSH SERPL DL<=0.005 MIU/L-ACNC: 0.58 UIU/ML (ref 0.45–4.5)

## 2018-09-10 ENCOUNTER — OFFICE VISIT (OUTPATIENT)
Dept: INTERNAL MEDICINE CLINIC | Age: 43
End: 2018-09-10

## 2018-09-10 VITALS
TEMPERATURE: 96 F | WEIGHT: 132 LBS | SYSTOLIC BLOOD PRESSURE: 121 MMHG | RESPIRATION RATE: 18 BRPM | HEIGHT: 59 IN | DIASTOLIC BLOOD PRESSURE: 84 MMHG | BODY MASS INDEX: 26.61 KG/M2 | HEART RATE: 74 BPM | OXYGEN SATURATION: 100 %

## 2018-09-10 DIAGNOSIS — Z00.00 WELL ADULT ON ROUTINE HEALTH CHECK: Primary | ICD-10-CM

## 2018-09-10 DIAGNOSIS — Z23 ENCOUNTER FOR IMMUNIZATION: ICD-10-CM

## 2018-09-10 NOTE — PROGRESS NOTES
Subjective:   37 y.o. female for Well Woman Check. She reports that she is feeling better with lexapro. Her gyne and breast care is done elsewhere by her Ob-Gyne physician. Patient Active Problem List   Diagnosis Code    Unilateral occipital headache R51    Sleep disturbance G47.9    Nausea alone R11.0    New daily persistent headache G44.52     Current Outpatient Prescriptions   Medication Sig Dispense Refill    multivitamin (ONE A DAY) tablet Take 1 Tab by mouth daily.  aspirin (ASPIRIN) 325 mg tablet Take 325 mg by mouth daily.  escitalopram oxalate (LEXAPRO) 10 mg tablet Take 1 Tab by mouth daily. 30 Tab 2     No Known Allergies  Past Medical History:   Diagnosis Date    Anxiety disorder     Arthritis     Carpal tunnel syndrome on both sides 2009    Headache     Migraines      Past Surgical History:   Procedure Laterality Date    HX ORTHOPAEDIC      bilateral CTS procedure 2009     Family History   Problem Relation Age of Onset    Hypertension Mother     Stroke Father     Diabetes Father     Migraines Maternal Grandmother     Headache Maternal Grandmother      Social History   Substance Use Topics    Smoking status: Current Some Day Smoker     Packs/day: 0.50    Smokeless tobacco: Never Used    Alcohol use Yes      Comment: socially        Lab Results  Component Value Date/Time   TSH 0.580 08/13/2018 04:10 PM   T4, Free 0.97 08/13/2018 04:10 PM         ROS: Feeling generally well. No TIA's or unusual headaches, no dysphagia. No prolonged cough. No dyspnea or chest pain on exertion. No abdominal pain, change in bowel habits, black or bloody stools. No urinary tract symptoms. No new or unusual musculoskeletal symptoms. Specific concerns today: none. .    Objective: The patient appears well, alert, oriented x 3, in no distress.   Visit Vitals    BP (!) 139/96 (BP 1 Location: Left arm, BP Patient Position: Sitting)    Pulse 85    Temp 96 °F (35.6 °C) (Temporal)    Resp 18    Ht 4' 11\" (1.499 m)    Wt 132 lb (59.9 kg)    SpO2 100%    BMI 26.66 kg/m2     ENT normal.  Neck supple. No adenopathy or thyromegaly. JENARO. Lungs are clear, good air entry, no wheezes, rhonchi or rales. S1 and S2 normal, no murmurs, regular rate and rhythm. Abdomen soft without tenderness, guarding, mass or organomegaly. Extremities show no edema, normal peripheral pulses. Neurological is normal, no focal findings. Breast and Pelvic exams are deferred. Assessment/Plan:   Well Woman  stop smoking (advice  given), follow low fat diet, follow low salt diet, continue present plan, routine labs ordered, call if any problems    ICD-10-CM ICD-9-CM    1. Well adult on routine health check Z00.00 V70.0 MICK MAMMO BI SCREENING INCL CAD      CBC WITH AUTOMATED DIFF      LIPID PANEL      METABOLIC PANEL, COMPREHENSIVE      HEMOGLOBIN A1C WITH EAG   2. Encounter for immunization Z23 V03.89 PNEUMOCOCCAL POLYSACCHARIDE VACCINE, 23-VALENT, ADULT OR IMMUNOSUPPRESSED PT DOSE,     Diagnoses and all orders for this visit:    1. Well adult on routine health check  -     MICK MAMMO BI SCREENING INCL CAD; Future  -     CBC WITH AUTOMATED DIFF  -     LIPID PANEL  -     METABOLIC PANEL, COMPREHENSIVE  -     HEMOGLOBIN A1C WITH EAG    2. Encounter for immunization  -     PNEUMOCOCCAL POLYSACCHARIDE VACCINE, 23-VALENT, ADULT OR IMMUNOSUPPRESSED PT DOSE,      Follow-up Disposition:  Return if symptoms worsen or fail to improve.   reviewed diet, exercise and weight control  very strongly urged to quit smoking to reduce cardiovascular risk  reviewed medications and side effects in detail

## 2018-09-10 NOTE — MR AVS SNAPSHOT
[address] [facility] 
 
 
 [address] [facility] [address] 
[phone] Patient: [facility] MRN: [id number] PXP:9/12/[id number] Visit Information Date & Time Provider Department Dept. Phone Encounter #  
 9/10/2018  3:30 PM Naye Curiel MD [facility] Internal Medicine [phone] [id number] Follow-up Instructions Return if symptoms worsen or fail to improve. Your Appointments 10/8/2018  4:00 PM  
ROUTINE CARE with MD Jesusita Neri 2Mon[address] ([facility]) Appt Note: follow up [address] [facility] 57  
[phone] [facility] Upcoming Health Maintenance Date Due  
 BREAST CANCER SCRN MAMMOGRAM 3/11/1991 Pneumococcal 19-64 Medium Risk (1 of 1 - PPSV23) 3/11/1992 DTaP/Tdap/Td series (1 - Tdap) 3/11/1994 Influenza Age 5 to Adult 9/10/2019* PAP AKA CERVICAL CYTOLOGY 10/18/2020 *Topic was postponed. The date shown is not the original due date. Allergies as of 9/10/2018  Review Complete On: 9/10/2018 By: Naye Curiel MD  
 No Known Allergies Current Immunizations  Reviewed on 9/10/2018 No immunizations on file. Reviewed by Naye Curiel MD on 9/10/2018 at  3:52 PM  
You Were Diagnosed With   
  
 Codes Comments Well adult on routine health check    -  Primary ICD-10-CM: Z00.00 ICD-9-CM: V70.0 Vitals BP Pulse Temp Resp Height(growth percentile) Weight(growth percentile) (!) 139/96 (BP 1 Location: Left arm, BP Patient Position: Sitting) 85 96 °F (35.6 °C) (Temporal) 18 4' 11\" (1.499 m) 132 lb (59.9 kg) SpO2 BMI OB Status Smoking Status 100% 26.66 kg/m2 Premenopausal Current Some Day Smoker Vitals History BMI and BSA Data Body Mass Index Body Surface Area  
 26.66 kg/m 2 1.58 m 2 Preferred Pharmacy Pharmacy Name Phone [address] [facility] [address] [place] [address] [phone] Your Updated Medication List  
  
   
This list is accurate as of 9/10/18  3:55 PM.  Always use your most recent med list.  
  
  
  
  
 aspirin 325 mg tablet Commonly known as:  ASPIRIN Take 325 mg by mouth daily. escitalopram oxalate 10 mg tablet Commonly known as:  Abel Meyers Take 1 Tab by mouth daily. multivitamin tablet Commonly known as:  ONE A DAY Take 1 Tab by mouth daily. We Performed the Following CBC WITH AUTOMATED DIFF [84533 CPT(R)] HEMOGLOBIN A1C WITH EAG [03128 CPT(R)] LIPID PANEL [86227 CPT(R)] METABOLIC PANEL, COMPREHENSIVE [35170 CPT(R)] Follow-up Instructions Return if symptoms worsen or fail to improve. To-Do List   
 09/10/2018 Imaging:  MICK MAMMO BI SCREENING INCL CAD Introducing [facility] & HEALTH SERVICES! Dagoberto Kim introduces Route4Me patient portal. Now you can access parts of your medical record, email your doctor's office, and request medication refills online. 1. In your internet browser, go to [email or web address] 2. Click on the First Time User? Click Here link in the Sign In box. You will see the New Member Sign Up page. 3. Enter your Route4Me Access Code exactly as it appears below. You will not need to use this code after youve completed the sign-up process. If you do not sign up before the expiration date, you must request a new code. · Route4Me Access Code: [email or web address] Expires: 11/11/2018  3:38 PM 
 
4. Enter the last four digits of your Social Security Number (xxxx) and Date of Birth (mm/dd/yyyy) as indicated and click Submit. You will be taken to the next sign-up page. 5. Create a Route4Me ID. This will be your Route4Me login ID and cannot be changed, so think of one that is secure and easy to remember. 6. Create a Route4Me password. You can change your password at any time. 7. Enter your Password Reset Question and Answer. This can be used at a later time if you forget your password. 8. Enter your e-mail address. You will receive e-mail notification when new information is available in [address] 9. Click Sign Up. You can now view and download portions of your medical record. 10. Click the Download Summary menu link to download a portable copy of your medical information. If you have questions, please visit the Frequently Asked Questions section of the UpCloo website. Remember, UpCloo is NOT to be used for urgent needs. For medical emergencies, dial 911. Now available from your iPhone and Android! Please provide this summary of care documentation to your next provider. Your primary care clinician is listed as Tuan Nielsen. If you have any questions after today's visit, please call [phone].

## 2018-09-11 LAB
ALBUMIN SERPL-MCNC: 4.3 G/DL (ref 3.5–5.5)
ALBUMIN/GLOB SERPL: 1.8 {RATIO} (ref 1.2–2.2)
ALP SERPL-CCNC: 53 IU/L (ref 39–117)
ALT SERPL-CCNC: 10 IU/L (ref 0–32)
AST SERPL-CCNC: 18 IU/L (ref 0–40)
BASOPHILS # BLD AUTO: 0 X10E3/UL (ref 0–0.2)
BASOPHILS NFR BLD AUTO: 0 %
BILIRUB SERPL-MCNC: 0.6 MG/DL (ref 0–1.2)
BUN SERPL-MCNC: 6 MG/DL (ref 6–24)
BUN/CREAT SERPL: 10 (ref 9–23)
CALCIUM SERPL-MCNC: 9 MG/DL (ref 8.7–10.2)
CHLORIDE SERPL-SCNC: 103 MMOL/L (ref 96–106)
CHOLEST SERPL-MCNC: 160 MG/DL (ref 100–199)
CO2 SERPL-SCNC: 22 MMOL/L (ref 20–29)
CREAT SERPL-MCNC: 0.59 MG/DL (ref 0.57–1)
EOSINOPHIL # BLD AUTO: 0.3 X10E3/UL (ref 0–0.4)
EOSINOPHIL NFR BLD AUTO: 4 %
ERYTHROCYTE [DISTWIDTH] IN BLOOD BY AUTOMATED COUNT: 13 % (ref 12.3–15.4)
EST. AVERAGE GLUCOSE BLD GHB EST-MCNC: 97 MG/DL
GLOBULIN SER CALC-MCNC: 2.4 G/DL (ref 1.5–4.5)
GLUCOSE SERPL-MCNC: 91 MG/DL (ref 65–99)
HBA1C MFR BLD: 5 % (ref 4.8–5.6)
HCT VFR BLD AUTO: 36.3 % (ref 34–46.6)
HDLC SERPL-MCNC: 67 MG/DL
HGB BLD-MCNC: 11.6 G/DL (ref 11.1–15.9)
IMM GRANULOCYTES # BLD: 0 X10E3/UL (ref 0–0.1)
IMM GRANULOCYTES NFR BLD: 0 %
INTERPRETATION, 910389: NORMAL
LDLC SERPL CALC-MCNC: 60 MG/DL (ref 0–99)
LYMPHOCYTES # BLD AUTO: 2 X10E3/UL (ref 0.7–3.1)
LYMPHOCYTES NFR BLD AUTO: 29 %
MCH RBC QN AUTO: 30.3 PG (ref 26.6–33)
MCHC RBC AUTO-ENTMCNC: 32 G/DL (ref 31.5–35.7)
MCV RBC AUTO: 95 FL (ref 79–97)
MONOCYTES # BLD AUTO: 0.4 X10E3/UL (ref 0.1–0.9)
MONOCYTES NFR BLD AUTO: 6 %
NEUTROPHILS # BLD AUTO: 4 X10E3/UL (ref 1.4–7)
NEUTROPHILS NFR BLD AUTO: 61 %
PLATELET # BLD AUTO: 306 X10E3/UL (ref 150–379)
POTASSIUM SERPL-SCNC: 4.1 MMOL/L (ref 3.5–5.2)
PROT SERPL-MCNC: 6.7 G/DL (ref 6–8.5)
RBC # BLD AUTO: 3.83 X10E6/UL (ref 3.77–5.28)
SODIUM SERPL-SCNC: 138 MMOL/L (ref 134–144)
TRIGL SERPL-MCNC: 166 MG/DL (ref 0–149)
VLDLC SERPL CALC-MCNC: 33 MG/DL (ref 5–40)
WBC # BLD AUTO: 6.7 X10E3/UL (ref 3.4–10.8)

## 2018-09-11 NOTE — PROGRESS NOTES
Please mail letter:    1. CBC, kidney, liver function is normal.    2. No diabetes. 3. Cholesterol level is good.

## 2018-10-15 ENCOUNTER — HOSPITAL ENCOUNTER (OUTPATIENT)
Dept: MAMMOGRAPHY | Age: 43
Discharge: HOME | End: 2018-10-15
Attending: FAMILY MEDICINE
Payer: MEDICAID

## 2018-10-15 ENCOUNTER — OFFICE VISIT (OUTPATIENT)
Dept: PRIMARY CARE CLINIC | Age: 43
End: 2018-10-15

## 2018-10-15 VITALS
HEART RATE: 93 BPM | DIASTOLIC BLOOD PRESSURE: 84 MMHG | WEIGHT: 134.8 LBS | RESPIRATION RATE: 17 BRPM | OXYGEN SATURATION: 98 % | TEMPERATURE: 98.8 F | HEIGHT: 59 IN | BODY MASS INDEX: 27.17 KG/M2 | SYSTOLIC BLOOD PRESSURE: 127 MMHG

## 2018-10-15 DIAGNOSIS — F32.A ANXIETY AND DEPRESSION: Primary | ICD-10-CM

## 2018-10-15 DIAGNOSIS — Z23 ENCOUNTER FOR IMMUNIZATION: ICD-10-CM

## 2018-10-15 DIAGNOSIS — Z12.31 VISIT FOR SCREENING MAMMOGRAM: ICD-10-CM

## 2018-10-15 DIAGNOSIS — F41.9 ANXIETY AND DEPRESSION: Primary | ICD-10-CM

## 2018-10-15 PROCEDURE — 77067 SCR MAMMO BI INCL CAD: CPT

## 2018-10-15 RX ORDER — ESCITALOPRAM OXALATE 10 MG/1
10 TABLET ORAL DAILY
Qty: 90 TAB | Refills: 1 | Status: SHIPPED | OUTPATIENT
Start: 2018-10-15 | End: 2019-09-24 | Stop reason: ALTCHOICE

## 2018-10-15 NOTE — MR AVS SNAPSHOT
[address] [place] [address] 
[phone] Patient: [facility] MRN: [id number] CNT:7/22/4390 Visit Information Date & Time Provider Department Dept. Phone Encounter #  
 10/15/2018  4:15 PM Casey Saab MD [facility] [phone] [id number] Follow-up Instructions Return in about 6 months (around 4/15/2019), or if symptoms worsen or fail to improve. Upcoming Health Maintenance Date Due  
 BREAST CANCER SCRN MAMMOGRAM 3/11/1991 DTaP/Tdap/Td series (1 - Tdap) 3/11/1994 Influenza Age 5 to Adult 9/10/2019* PAP AKA CERVICAL CYTOLOGY 10/18/2020 *Topic was postponed. The date shown is not the original due date. Allergies as of 10/15/2018  Review Complete On: 10/15/2018 By: Kt Fish MD  
 No Known Allergies Current Immunizations  Reviewed on 9/10/2018 Name Date Influenza Vaccine (Quad) PF 10/15/2018 Pneumococcal Polysaccharide (PPSV-23) 9/10/2018 Not reviewed this visit You Were Diagnosed With   
  
 Codes Comments Anxiety and depression    -  Primary ICD-10-CM: F41.9, F32.9 ICD-9-CM: 300.00, 311 Encounter for immunization     ICD-10-CM: K24 ICD-9-CM: V03.89 Vitals BP Pulse Temp Resp Height(growth percentile) Weight(growth percentile) 127/84 (BP 1 Location: Right arm, BP Patient Position: Sitting) 93 98.8 °F (37.1 °C) (Oral) 17 4' 11\" (1.499 m) 134 lb 12.8 oz (61.1 kg) LMP SpO2 BMI OB Status Smoking Status 10/02/2018 98% 27.23 kg/m2 Perimenopausal Current Some Day Smoker Vitals History BMI and BSA Data Body Mass Index Body Surface Area  
 27.23 kg/m 2 1.59 m 2 Preferred Pharmacy Pharmacy Name Phone 1941 [facility] [address] [phone] Your Updated Medication List  
  
   
This list is accurate as of 10/15/18  4:45 PM.  Always use your most recent med list.  
  
  
  
  
 aspirin 325 mg tablet Commonly known as:  ASPIRIN Take 325 mg by mouth daily. escitalopram oxalate 10 mg tablet Commonly known as:  Jesus [place] Take 1 Tab by mouth daily. multivitamin tablet Commonly known as:  ONE A DAY Take 1 Tab by mouth daily. Prescriptions Sent to Pharmacy Refills  
 escitalopram oxalate (LEXAPRO) 10 mg tablet 1 Sig: Take 1 Tab by mouth daily. Class: Normal  
 Pharmacy: [facility] [address] [facility], [address] [place] [address] #: [phone] Route: Oral  
  
We Performed the Following INFLUENZA VIRUS VAC QUAD,SPLIT,PRESV FREE SYRINGE IM [id number] CPT(R)] Follow-up Instructions Return in about 6 months (around 4/15/2019), or if symptoms worsen or fail to improve. Introducing [facility] & HEALTH SERVICES! [facility] introduces Smart Mocha patient portal. Now you can access parts of your medical record, email your doctor's office, and request medication refills online. 1. In your internet browser, go to [email or web address] 2. Click on the First Time User? Click Here link in the Sign In box. You will see the New Member Sign Up page. 3. Enter your Smart Mocha Access Code exactly as it appears below. You will not need to use this code after youve completed the sign-up process. If you do not sign up before the expiration date, you must request a new code. · Smart Mocha Access Code: [email or web address] Expires: 11/11/2018  3:38 PM 
 
4. Enter the last four digits of your Social Security Number (xxxx) and Date of Birth (mm/dd/yyyy) as indicated and click Submit. You will be taken to the next sign-up page. 5. Create a Smart Mocha ID. This will be your Smart Mocha login ID and cannot be changed, so think of one that is secure and easy to remember. 6. Create a Smart Mocha password. You can change your password at any time. 7. Enter your Password Reset Question and Answer.  This can be used at a later time if you forget your password. 8. Enter your e-mail address. You will receive e-mail notification when new information is available in [address] 9. Click Sign Up. You can now view and download portions of your medical record. 10. Click the Download Summary menu link to download a portable copy of your medical information. If you have questions, please visit the Frequently Asked Questions section of the Next Performance website. Remember, Next Performance is NOT to be used for urgent needs. For medical emergencies, dial 911. Now available from your iPhone and Android! Please provide this summary of care documentation to your next provider. Your primary care clinician is listed as Casey De Anda. If you have any questions after today's visit, please call [phone].

## 2018-10-15 NOTE — PROGRESS NOTES
Subjective:     Chief Complaint   Patient presents with    Anxiety        She  is a 37y.o. year old female who presents today for 2 months follow up on anxiety and depression. She was started on Lexapro 10 mg about two months ago . States that she has been feeling better. Anxiety has been well controlled with this med. Has bene sleeping better. Pertinent items are noted in HPI. Objective:     Vitals:    10/15/18 1623   BP: 127/84   Pulse: 93   Resp: 17   Temp: 98.8 °F (37.1 °C)   TempSrc: Oral   SpO2: 98%   Weight: 134 lb 12.8 oz (61.1 kg)   Height: 4' 11\" (1.499 m)       Physical Examination: General appearance - alert, well appearing, and in no distress, oriented to person, place, and time and normal appearing weight  Mental status - alert, oriented to person, place, and time, normal mood, behavior, speech, dress, motor activity, and thought processes  Chest - clear to auscultation, no wheezes, rales or rhonchi, symmetric air entry  Heart - normal rate, regular rhythm, normal S1, S2, no murmurs, rubs, clicks or gallops.     No Known Allergies   Social History     Social History    Marital status:      Spouse name: N/A    Number of children: N/A    Years of education: N/A     Social History Main Topics    Smoking status: Current Some Day Smoker     Packs/day: 0.50    Smokeless tobacco: Never Used    Alcohol use Yes      Comment: socially    Drug use: No    Sexual activity: Yes     Partners: Male     Birth control/ protection: None     Other Topics Concern    None     Social History Narrative      Family History   Problem Relation Age of Onset    Hypertension Mother     Stroke Father     Diabetes Father     Migraines Maternal Grandmother     Headache Maternal Grandmother       Past Surgical History:   Procedure Laterality Date    HX ORTHOPAEDIC      bilateral CTS procedure 2009      Past Medical History:   Diagnosis Date    Anxiety disorder     Arthritis     Carpal tunnel syndrome on both sides 2009    Headache     Migraines       Current Outpatient Prescriptions   Medication Sig Dispense Refill    aspirin (ASPIRIN) 325 mg tablet Take 325 mg by mouth daily.  escitalopram oxalate (LEXAPRO) 10 mg tablet Take 1 Tab by mouth daily. 30 Tab 2    multivitamin (ONE A DAY) tablet Take 1 Tab by mouth daily. Assessment/ Plan:   Diagnoses and all orders for this visit:    1. Anxiety and depression  -    Well controlled. Continue  escitalopram oxalate (LEXAPRO) 10 mg tablet; Take 1 Tab by mouth daily. 2. Encounter for immunization  -     INFLUENZA VIRUS VACCINE QUADRIVALENT, PRESERVATIVE FREE SYRINGE (05291)           Medication risks/benefits/costs/interactions/alternatives discussed with patient. Advised patient to call back or return to office if symptoms worsen/change/persist. If patient cannot reach us or should anything more severe/urgent arise he/she should proceed directly to the nearest emergency department. Discussed expected course/resolution/complications of diagnosis in detail with patient. Patient given a written after visit summary which includes her diagnoses, current medications and vitals. Patient expressed understanding with the diagnosis and plan.        Follow-up Disposition: Not on File

## 2019-08-13 ENCOUNTER — OFFICE VISIT (OUTPATIENT)
Dept: PRIMARY CARE CLINIC | Age: 44
End: 2019-08-13

## 2019-08-13 VITALS
BODY MASS INDEX: 26.81 KG/M2 | HEIGHT: 59 IN | RESPIRATION RATE: 16 BRPM | HEART RATE: 72 BPM | WEIGHT: 133 LBS | DIASTOLIC BLOOD PRESSURE: 95 MMHG | OXYGEN SATURATION: 98 % | SYSTOLIC BLOOD PRESSURE: 140 MMHG | TEMPERATURE: 97.9 F

## 2019-08-13 DIAGNOSIS — R03.0 SINGLE EPISODE OF ELEVATED BLOOD PRESSURE: ICD-10-CM

## 2019-08-13 DIAGNOSIS — F17.200 SMOKER: ICD-10-CM

## 2019-08-13 DIAGNOSIS — J20.9 ACUTE BRONCHITIS, UNSPECIFIED ORGANISM: Primary | ICD-10-CM

## 2019-08-13 RX ORDER — ALBUTEROL SULFATE 90 UG/1
1 AEROSOL, METERED RESPIRATORY (INHALATION)
Qty: 1 INHALER | Refills: 0 | Status: SHIPPED | OUTPATIENT
Start: 2019-08-13 | End: 2020-06-22 | Stop reason: SDUPTHER

## 2019-08-13 RX ORDER — METHYLPREDNISOLONE 4 MG/1
TABLET ORAL
Qty: 1 DOSE PACK | Refills: 0 | Status: SHIPPED | OUTPATIENT
Start: 2019-08-13 | End: 2019-09-24 | Stop reason: ALTCHOICE

## 2019-08-13 RX ORDER — AZITHROMYCIN 250 MG/1
TABLET, FILM COATED ORAL
Qty: 6 TAB | Refills: 0 | Status: SHIPPED | OUTPATIENT
Start: 2019-08-13 | End: 2019-08-18

## 2019-08-13 NOTE — PROGRESS NOTES
Wilmer Marvin is a 53 y.o. female    Chief Complaint   Patient presents with    Cold Symptoms     productive cough with yellow/green mucus and congestion, comes only in the morning x1 week, no sore throat or fever       1. Have you been to the ER, urgent care clinic since your last visit? Hospitalized since your last visit? No    2. Have you seen or consulted any other health care providers outside of the [address] [place] [address] since your last visit? Include any pap smears or colon screening. No    No flowsheet data found.      Health Maintenance Due   Topic Date Due    DTaP/Tdap/Td series (1 - Tdap) 03/11/1994    Influenza Age 5 to Adult  08/01/2019    BREAST CANCER SCRN MAMMOGRAM  10/15/2019

## 2019-08-13 NOTE — PROGRESS NOTES
Subjective:     Chief Complaint   Patient presents with    Cold Symptoms     productive cough with yellow/green mucus and congestion, comes only in the morning x1 week, no sore throat or fever        She  is a 53y.o. year old female who presents with a c/o yellowish greenish productive cough , chest congestion for the past one week. Denies any sore throat, fever, chills. No OTC med tried. Pertinent items are noted in HPI. Objective:     Vitals:    08/13/19 0835   BP: (!) 135/95   Pulse: 72   Resp: 16   Temp: 97.9 °F (36.6 °C)   TempSrc: Oral   SpO2: 98%   Weight: 133 lb (60.3 kg)   Height: 4' 11\" (1.499 m)       Physical Examination: General appearance - alert, well appearing, and in no distress, oriented to person, place, and time and overweight  Mental status - alert, oriented to person, place, and time  Ears - bilateral TM's and external ear canals normal  Nose - normal and patent, no erythema, discharge or polyps  Mouth - mucous membranes moist, pharynx normal without lesions  Neck - supple, no significant adenopathy  Chest - clear to auscultation, no  rales or rhonchi, symmetric air entry. Mild expiratory wheezing noted in both lung field.   Heart - normal rate, regular rhythm, normal S1, S2, no murmurs, rubs, clicks or gallops    No Known Allergies   Social History     Socioeconomic History    Marital status:      Spouse name: Not on file    Number of children: Not on file    Years of education: Not on file    Highest education level: Not on file   Tobacco Use    Smoking status: Light Tobacco Smoker     Packs/day: 0.00    Smokeless tobacco: Never Used    Tobacco comment: 5 cigs a day   Substance and Sexual Activity    Alcohol use: Yes     Comment: socially    Drug use: No    Sexual activity: Yes     Partners: Male     Birth control/protection: None      Family History   Problem Relation Age of Onset    Hypertension Mother     Stroke Father     Diabetes Father     Migraines Maternal Grandmother     Headache Maternal Grandmother       Past Surgical History:   Procedure Laterality Date    HX ORTHOPAEDIC      bilateral CTS procedure 2009      Past Medical History:   Diagnosis Date    Anxiety disorder     Arthritis     Carpal tunnel syndrome on both sides 2009    Headache     Migraines       Current Outpatient Medications   Medication Sig Dispense Refill    multivitamin (ONE A DAY) tablet Take 1 Tab by mouth daily.  escitalopram oxalate (LEXAPRO) 10 mg tablet Take 1 Tab by mouth daily. 90 Tab 1    aspirin (ASPIRIN) 325 mg tablet Take 325 mg by mouth daily. Assessment/ Plan:   Diagnoses and all orders for this visit:    1. Acute bronchitis, unspecified organism  -     methylPREDNISolone (MEDROL DOSEPACK) 4 mg tablet; Follow pack instruction. -     albuterol (PROVENTIL HFA, VENTOLIN HFA, PROAIR HFA) 90 mcg/actuation inhaler; Take 1 Puff by inhalation every six (6) hours as needed for Wheezing or Shortness of Breath.  -     azithromycin (ZITHROMAX) 250 mg tablet; Take 2 tablets today, then take 1 tablet daily    2. Smoker  -     methylPREDNISolone (MEDROL DOSEPACK) 4 mg tablet; Follow pack instruction. -     albuterol (PROVENTIL HFA, VENTOLIN HFA, PROAIR HFA) 90 mcg/actuation inhaler; Take 1 Puff by inhalation every six (6) hours as needed for Wheezing or Shortness of Breath.  -     azithromycin (ZITHROMAX) 250 mg tablet; Take 2 tablets today, then take 1 tablet daily        - counseled patient to quit smoking. 3. Single episode of elevated blood pressure      - Monitor BP and follow up in 6 weeks. Medication risks/benefits/costs/interactions/alternatives discussed with patient. Advised patient to call back or return to office if symptoms worsen/change/persist. If patient cannot reach us or should anything more severe/urgent arise he/she should proceed directly to the nearest emergency department.   Discussed expected course/resolution/complications of diagnosis in detail with patient. Patient given a written after visit summary which includes her diagnoses, current medications and vitals. Patient expressed understanding with the diagnosis and plan. Follow-up and Dispositions    · Return in about 6 weeks (around 9/24/2019) for Bring BP log book. Jessica Avalos

## 2019-08-20 ENCOUNTER — TELEPHONE (OUTPATIENT)
Dept: PRIMARY CARE CLINIC | Age: 44
End: 2019-08-20

## 2019-08-20 NOTE — TELEPHONE ENCOUNTER
[facility]             Caller: Kylee Arias   Reason for call: personal call for nurse or Dr. Kathleen Mittal, Ms Dasia Mckeon said you know why she is calling and did not want to disclose any other information   Callback requested: YES   Best contact: [phone]   Additional details: She did state her daughter is in the hospital- but would not give any other details- just wants someone to call her back asap

## 2019-08-20 NOTE — TELEPHONE ENCOUNTER
Pt states that she called yesterday in regards to her daughter who is also a patient here. She did not call in regards to herself. She already spoke with someone and does not need anything at this time. Call was in regards to her daughter, not her.

## 2019-09-24 ENCOUNTER — OFFICE VISIT (OUTPATIENT)
Dept: PRIMARY CARE CLINIC | Age: 44
End: 2019-09-24

## 2019-09-24 VITALS
HEART RATE: 72 BPM | WEIGHT: 136.2 LBS | BODY MASS INDEX: 27.46 KG/M2 | RESPIRATION RATE: 16 BRPM | HEIGHT: 59 IN | SYSTOLIC BLOOD PRESSURE: 150 MMHG | TEMPERATURE: 98 F | DIASTOLIC BLOOD PRESSURE: 100 MMHG | OXYGEN SATURATION: 100 %

## 2019-09-24 DIAGNOSIS — I10 ESSENTIAL HYPERTENSION: Primary | ICD-10-CM

## 2019-09-24 DIAGNOSIS — Z12.39 SCREENING FOR BREAST CANCER: ICD-10-CM

## 2019-09-24 RX ORDER — AMLODIPINE BESYLATE 5 MG/1
5 TABLET ORAL DAILY
Qty: 30 TAB | Refills: 2 | Status: SHIPPED | OUTPATIENT
Start: 2019-09-24 | End: 2019-10-29

## 2019-09-24 NOTE — PROGRESS NOTES
Subjective:     Chief Complaint   Patient presents with    Blood Pressure Check     follow up        She  is a 53y.o. year old female who presents today for follow up on blood pressure. In last visit BP was elevated so I asked her to have a follow up. Did not get a chance to check BP at home. She reports that she can feel that her BP is high because she has been having a nagging headache. Denies any cough, chest pain, soa    Pertinent items are noted in HPI.   Objective:     Vitals:    09/24/19 0842 09/24/19 0857   BP: (!) 145/98 (!) 150/100   Pulse: 72    Resp: 16    Temp: 98 °F (36.7 °C)    TempSrc: Oral    SpO2: 100%    Weight: 136 lb 3.2 oz (61.8 kg)    Height: 4' 11\" (1.499 m)        Physical Examination: General appearance - alert, well appearing, and in no distress, oriented to person, place, and time and overweight  Mental status - alert, oriented to person, place, and time, normal mood, behavior, speech, dress, motor activity, and thought processes  Chest - clear to auscultation, no wheezes, rales or rhonchi, symmetric air entry  Heart - normal rate, regular rhythm, normal S1, S2, no murmurs, rubs, clicks or gallops  Extremities - no pedal edema noted    No Known Allergies   Social History     Socioeconomic History    Marital status:      Spouse name: Not on file    Number of children: Not on file    Years of education: Not on file    Highest education level: Not on file   Tobacco Use    Smoking status: Light Tobacco Smoker     Packs/day: 0.00    Smokeless tobacco: Never Used    Tobacco comment: 5 cigs a day   Substance and Sexual Activity    Alcohol use: Yes     Comment: socially    Drug use: No    Sexual activity: Yes     Partners: Male     Birth control/protection: None      Family History   Problem Relation Age of Onset    Hypertension Mother     Stroke Father     Diabetes Father     Migraines Maternal Grandmother     Headache Maternal Grandmother       Past Surgical History: Procedure Laterality Date    HX ORTHOPAEDIC      bilateral CTS procedure 2009      Past Medical History:   Diagnosis Date    Anxiety disorder     Arthritis     Carpal tunnel syndrome on both sides 2009    Headache     Migraines       Current Outpatient Medications   Medication Sig Dispense Refill    multivitamin (ONE A DAY) tablet Take 1 Tab by mouth daily.  aspirin (ASPIRIN) 325 mg tablet Take 325 mg by mouth daily.  albuterol (PROVENTIL HFA, VENTOLIN HFA, PROAIR HFA) 90 mcg/actuation inhaler Take 1 Puff by inhalation every six (6) hours as needed for Wheezing or Shortness of Breath. 1 Inhaler 0        Assessment/ Plan:   Diagnoses and all orders for this visit:    1. Essential hypertension  -    Repeat BP is elevated. Start  amLODIPine (NORVASC) 5 mg tablet; Take 1 Tab by mouth daily. Low slat diet, exercise. 2. Screening for breast cancer  -     [facility] 3D KERI W MAMMO BI SCREENING INCL CAD; Future           Medication risks/benefits/costs/interactions/alternatives discussed with patient. Advised patient to call back or return to office if symptoms worsen/change/persist. If patient cannot reach us or should anything more severe/urgent arise he/she should proceed directly to the nearest emergency department. Discussed expected course/resolution/complications of diagnosis in detail with patient. Patient given a written after visit summary which includes her diagnoses, current medications and vitals. Patient expressed understanding with the diagnosis and plan. Follow-up and Dispositions    · Return in about 2 months (around 11/24/2019) for Bring BP log book. Tosha Ford

## 2019-09-24 NOTE — PROGRESS NOTES
Chief Complaint   Patient presents with    Blood Pressure Check     follow up     1. Have you been to the ER, urgent care clinic since your last visit? Hospitalized since your last visit? No    2. Have you seen or consulted any other health care providers outside of the [address] [place] [address] since your last visit? Include any pap smears or colon screening.  No

## 2019-10-19 ENCOUNTER — HOSPITAL ENCOUNTER (OUTPATIENT)
Dept: MAMMOGRAPHY | Age: 44
Discharge: HOME | End: 2019-10-19
Attending: FAMILY MEDICINE
Payer: MEDICAID

## 2019-10-19 DIAGNOSIS — Z12.39 SCREENING FOR BREAST CANCER: ICD-10-CM

## 2019-10-19 PROCEDURE — 77063 BREAST TOMOSYNTHESIS BI: CPT

## 2019-10-29 ENCOUNTER — OFFICE VISIT (OUTPATIENT)
Dept: PRIMARY CARE CLINIC | Age: 44
End: 2019-10-29

## 2019-10-29 VITALS
SYSTOLIC BLOOD PRESSURE: 145 MMHG | WEIGHT: 137.8 LBS | TEMPERATURE: 97.8 F | RESPIRATION RATE: 18 BRPM | OXYGEN SATURATION: 100 % | DIASTOLIC BLOOD PRESSURE: 89 MMHG | HEART RATE: 63 BPM | BODY MASS INDEX: 27.78 KG/M2 | HEIGHT: 59 IN

## 2019-10-29 DIAGNOSIS — I10 ESSENTIAL HYPERTENSION: Primary | ICD-10-CM

## 2019-10-29 DIAGNOSIS — Z23 ENCOUNTER FOR IMMUNIZATION: ICD-10-CM

## 2019-10-29 RX ORDER — AMLODIPINE BESYLATE 10 MG/1
10 TABLET ORAL DAILY
Qty: 90 TAB | Refills: 1 | Status: SHIPPED | OUTPATIENT
Start: 2019-10-29 | End: 2020-07-23 | Stop reason: SDUPTHER

## 2019-10-29 NOTE — PROGRESS NOTES
Subjective:     Chief Complaint   Patient presents with    Elevated Blood Pressure        She  is a 53y.o. year old female for follow up on blood pressure. She reports that her BP was up at her OB office and she has been having dull headache. Denies any cough, chest pain, soa. She is currently on Amlodipine 5 mg daily. Compliant. Pertinent items are noted in HPI.   Objective:     Vitals:    10/29/19 1450   BP: 145/89   Pulse: 63   Resp: 18   Temp: 97.8 °F (36.6 °C)   TempSrc: Oral   SpO2: 100%   Weight: 137 lb 12.8 oz (62.5 kg)   Height: 4' 11\" (1.499 m)       Physical Examination: General appearance - alert, well appearing, and in no distress, oriented to person, place, and time and overweight  Mental status - alert, oriented to person, place, and time, normal mood, behavior, speech, dress, motor activity, and thought processes  Chest - clear to auscultation, no wheezes, rales or rhonchi, symmetric air entry  Heart - normal rate, regular rhythm, normal S1, S2, no murmurs, rubs, clicks or gallops    No Known Allergies   Social History     Socioeconomic History    Marital status:      Spouse name: Not on file    Number of children: Not on file    Years of education: Not on file    Highest education level: Not on file   Tobacco Use    Smoking status: Light Tobacco Smoker     Packs/day: 0.00    Smokeless tobacco: Never Used    Tobacco comment: 5 cigs a day   Substance and Sexual Activity    Alcohol use: Yes     Comment: socially    Drug use: No    Sexual activity: Yes     Partners: Male     Birth control/protection: None      Family History   Problem Relation Age of Onset    Hypertension Mother     Stroke Father     Diabetes Father     Migraines Maternal Grandmother     Headache Maternal Grandmother       Past Surgical History:   Procedure Laterality Date    HX ORTHOPAEDIC      bilateral CTS procedure 2009      Past Medical History:   Diagnosis Date    Anxiety disorder     Arthritis     Carpal tunnel syndrome on both sides 2009    Headache     Migraines       Current Outpatient Medications   Medication Sig Dispense Refill    amLODIPine (NORVASC) 10 mg tablet Take 1 Tab by mouth daily. 90 Tab 1    multivitamin (ONE A DAY) tablet Take 1 Tab by mouth daily.  aspirin (ASPIRIN) 325 mg tablet Take 325 mg by mouth daily.  albuterol (PROVENTIL HFA, VENTOLIN HFA, PROAIR HFA) 90 mcg/actuation inhaler Take 1 Puff by inhalation every six (6) hours as needed for Wheezing or Shortness of Breath. 1 Inhaler 0        Assessment/ Plan:   Diagnoses and all orders for this visit:    1. Essential hypertension  -    BP uncontrolled. Will increase amLODIPine (NORVASC) 10 mg tablet; Take 1 Tab by mouth daily. Low salt diet. 2. Encounter for immunization  -     INFLUENZA VIRUS VAC QUAD,SPLIT,PRESV FREE SYRINGE IM           Medication risks/benefits/costs/interactions/alternatives discussed with patient. Advised patient to call back or return to office if symptoms worsen/change/persist. If patient cannot reach us or should anything more severe/urgent arise he/she should proceed directly to the nearest emergency department. Discussed expected course/resolution/complications of diagnosis in detail with patient. Patient given a written after visit summary which includes her diagnoses, current medications and vitals. Patient expressed understanding with the diagnosis and plan. Follow-up and Dispositions    · Return in about 8 weeks (around 12/23/2019) for Bring BP log book. Malathi Weston

## 2019-12-05 ENCOUNTER — HOSPITAL ENCOUNTER (EMERGENCY)
Age: 44
Discharge: HOME | End: 2019-12-06
Attending: EMERGENCY MEDICINE | Admitting: EMERGENCY MEDICINE
Payer: MEDICAID

## 2019-12-05 VITALS
SYSTOLIC BLOOD PRESSURE: 137 MMHG | HEIGHT: 60 IN | BODY MASS INDEX: 26.53 KG/M2 | DIASTOLIC BLOOD PRESSURE: 93 MMHG | WEIGHT: 135.14 LBS | TEMPERATURE: 98.4 F | HEART RATE: 92 BPM | RESPIRATION RATE: 16 BRPM

## 2019-12-05 DIAGNOSIS — J03.90 ACUTE TONSILLITIS, UNSPECIFIED ETIOLOGY: Primary | ICD-10-CM

## 2019-12-05 LAB — S PYO AG THROAT QL: NEGATIVE

## 2019-12-05 PROCEDURE — 87070 CULTURE OTHR SPECIMN AEROBIC: CPT

## 2019-12-05 PROCEDURE — 87880 STREP A ASSAY W/OPTIC: CPT

## 2019-12-05 PROCEDURE — 74011250636 HC RX REV CODE- 250/636: Performed by: PHYSICIAN ASSISTANT

## 2019-12-05 PROCEDURE — 74011250637 HC RX REV CODE- 250/637: Performed by: PHYSICIAN ASSISTANT

## 2019-12-05 PROCEDURE — 99284 EMERGENCY DEPT VISIT MOD MDM: CPT

## 2019-12-05 RX ORDER — DEXAMETHASONE 4 MG/1
10 TABLET ORAL
Status: COMPLETED | OUTPATIENT
Start: 2019-12-05 | End: 2019-12-05

## 2019-12-05 RX ORDER — CLINDAMYCIN HYDROCHLORIDE 300 MG/1
300 CAPSULE ORAL 3 TIMES DAILY
Qty: 21 CAP | Refills: 0 | Status: SHIPPED | OUTPATIENT
Start: 2019-12-05 | End: 2019-12-12

## 2019-12-05 RX ADMIN — DEXAMETHASONE 10 MG: 4 TABLET ORAL at 23:40

## 2019-12-05 RX ADMIN — DIPHENHYDRAMINE HYDROCHLORIDE AND LIDOCAINE HYDROCHLORIDE AND ALUMINUM HYDROXIDE AND MAGNESIUM HYDRO 5 ML: KIT at 23:52

## 2019-12-06 NOTE — ED NOTES
Discharge instructions given to pt by ENEDINA Ny in teach back method and verbalizes understanding. Opportunity for questions provided. Pt ambulatory out of unit, in no acute distress and taken home by self.

## 2019-12-06 NOTE — DISCHARGE INSTRUCTIONS
Patient Education        Tonsillitis: Care Instructions  Your Care Instructions    Tonsillitis is an infection of the tonsils that is caused by bacteria or a virus. The tonsils are in the back of the throat and are part of the immune system. Tonsillitis typically lasts from a few days up to a couple of weeks. Tonsillitis caused by a virus goes away on its own. Tonsillitis caused by the bacteria that causes strep throat is treated with antibiotics. You and your doctor may consider surgery to remove the tonsils (tonsillectomy) if you have serious complications or repeat infections. Follow-up care is a key part of your treatment and safety. Be sure to make and go to all appointments, and call your doctor if you are having problems. It's also a good idea to know your test results and keep a list of the medicines you take. How can you care for yourself at home? · If your doctor prescribed antibiotics, take them as directed. Do not stop taking them just because you feel better. You need to take the full course of antibiotics. · Gargle with warm salt water. This helps reduce swelling and relieve discomfort. Gargle once an hour with 1 teaspoon of salt mixed in 8 fluid ounces of warm water. · Take an over-the-counter pain medicine, such as acetaminophen (Tylenol), ibuprofen (Advil, Motrin), or naproxen (Aleve). Be safe with medicines. Read and follow all instructions on the label. No one younger than 20 should take aspirin. It has been linked to Reye syndrome, a serious illness. · Be careful when taking over-the-counter cold or flu medicines and Tylenol at the same time. Many of these medicines have acetaminophen, which is Tylenol. Read the labels to make sure that you are not taking more than the recommended dose. Too much acetaminophen (Tylenol) can be harmful. · Try an over-the-counter throat spray to relieve throat pain. · Drink plenty of fluids. Fluids may help soothe an irritated throat.  Drink warm or cool liquids (whichever feels better). These include tea, soup, and juice. · Do not smoke, and avoid secondhand smoke. Smoking can make tonsillitis worse. If you need help quitting, talk to your doctor about stop-smoking programs and medicines. These can increase your chances of quitting for good. · Use a vaporizer or humidifier to add moisture to your bedroom. Follow the directions for cleaning the machine. When should you call for help? Call your doctor now or seek immediate medical care if:    · Your pain gets worse on one side of your throat.     · You have a new or higher fever.     · You notice changes in your voice.     · You have trouble opening your mouth.     · You have any trouble breathing.     · You have much more trouble swallowing.     · You have a fever with a stiff neck or a severe headache.     · You are sensitive to light or feel very sleepy or confused.    Watch closely for changes in your health, and be sure to contact your doctor if:    · You do not get better after 2 days. Where can you learn more? Go to http://masha-briseyda.info/. Enter O889 in the search box to learn more about \"Tonsillitis: Care Instructions. \"  Current as of: October 21, 2018  Content Version: 12.2  © 3923-6499 [employer]. Care instructions adapted under license by [employer] (which disclaims liability or warranty for this information). If you have questions about a medical condition or this instruction, always ask your healthcare professional. [place] [address] any warranty or liability for your use of this information.

## 2019-12-06 NOTE — ED PROVIDER NOTES
ST for the past 5 days. Seen at [facility] and had - strep test. Was prescribed amoxicillin and PO steroid pack. No improvement. Complains of rt sided throat swelling. Pain radiating into the nasal/sinus area. No fever, chills, HA, cough, runny nose, ear pain, CP, SOB, abdominal pain, nausea, vomiting, diarrhea or urinary complaint.             Past Medical History:   Diagnosis Date    Anxiety disorder     Arthritis     Carpal tunnel syndrome on both sides 2009    Headache     Migraines        Past Surgical History:   Procedure Laterality Date    HX ORTHOPAEDIC      bilateral CTS procedure 2009         Family History:   Problem Relation Age of Onset    Hypertension Mother     Stroke Father     Diabetes Father     Migraines Maternal Grandmother     Headache Maternal Grandmother        Social History     Socioeconomic History    Marital status:      Spouse name: Not on file    Number of children: Not on file    Years of education: Not on file    Highest education level: Not on file   Occupational History    Not on file   Social Needs    Financial resource strain: Not on file    Food insecurity:     Worry: Not on file     Inability: Not on file    Transportation needs:     Medical: Not on file     Non-medical: Not on file   Tobacco Use    Smoking status: Heavy Tobacco Smoker     Packs/day: 0.50    Smokeless tobacco: Never Used    Tobacco comment: 5 cigs a day   Substance and Sexual Activity    Alcohol use: Yes     Comment: socially    Drug use: No    Sexual activity: Yes     Partners: Male     Birth control/protection: None   Lifestyle    Physical activity:     Days per week: Not on file     Minutes per session: Not on file    Stress: Not on file   Relationships    Social connections:     Talks on phone: Not on file     Gets together: Not on file     Attends Nondenominational service: Not on file     Active member of club or organization: Not on file     Attends meetings of clubs or organizations: Not on file     Relationship status: Not on file    Intimate partner violence:     Fear of current or ex partner: Not on file     Emotionally abused: Not on file     Physically abused: Not on file     Forced sexual activity: Not on file   Other Topics Concern    Not on file   Social History Narrative    Not on file         ALLERGIES: Patient has no known allergies. Review of Systems   Constitutional: Negative. Negative for chills, fatigue and fever. HENT: Positive for sore throat. Negative for congestion, ear pain, facial swelling, rhinorrhea and sneezing. Respiratory: Negative for cough and shortness of breath. Cardiovascular: Negative. Negative for chest pain. Gastrointestinal: Negative. Negative for abdominal pain, constipation, diarrhea, nausea and vomiting. Genitourinary: Negative for difficulty urinating, frequency and urgency. Neurological: Negative for headaches. All other systems reviewed and are negative. Vitals:    19 2208   BP: (!) 147/109   Pulse: 100   Resp: 18   Temp: 98.4 °F (36.9 °C)   Weight: 61.3 kg (135 lb 2.3 oz)   Height: 5' (1.524 m)            Physical Exam  Vitals signs and nursing note reviewed. Constitutional:       General: She is not in acute distress. Appearance: She is well-developed. Comments: Well appearing female in NAD   HENT:      Head: Normocephalic and atraumatic. Right Ear: Tympanic membrane, ear canal and external ear normal. No swelling. Left Ear: Tympanic membrane, ear canal and external ear normal. No swelling. Nose: Nose normal.      Mouth/Throat:      Mouth: Mucous membranes are moist. No oral lesions. Pharynx: Uvula midline. Posterior oropharyngeal erythema present. No pharyngeal swelling, oropharyngeal exudate or uvula swelling. Tonsils: Swellin+ on the right. Eyes:      Conjunctiva/sclera: Conjunctivae normal.      Pupils: Pupils are equal, round, and reactive to light.    Neck: Musculoskeletal: Normal range of motion and neck supple. Cardiovascular:      Rate and Rhythm: Normal rate and regular rhythm. Heart sounds: Normal heart sounds. Pulmonary:      Effort: Pulmonary effort is normal. No respiratory distress. Breath sounds: No wheezing. Abdominal:      General: Bowel sounds are normal. There is no distension. Palpations: Abdomen is soft. Tenderness: There is no tenderness. There is no rebound. Musculoskeletal: Normal range of motion. Skin:     General: Skin is warm and dry. Findings: No ecchymosis, laceration or lesion. Neurological:      Mental Status: She is alert and oriented to person, place, and time. MDM  Number of Diagnoses or Management Options  Acute tonsillitis, unspecified etiology:   Diagnosis management comments: 52 yo female with c/o continued ST despite abx therapy. Reported concern for throat swelling but uvula midline without any PE findings to suggest PTA. Tolerating PO    Plan  clinda  Decadron  Magic mouthwash  ENT follow-up. Stephanie Espinalma           Procedures        Progress note    Patient feeling better after Magic mouthwash. Stephanie Espinalma    Patient's results have been reviewed with them. Patient and/or family have verbally conveyed their understanding and agreement of the patient's signs, symptoms, diagnosis, treatment and prognosis and additionally agree to follow up as recommended or return to the Emergency Room should their condition change prior to follow-up. Discharge instructions have also been provided to the patient with some educational information regarding their diagnosis as well a list of reasons why they would want to return to the ER prior to their follow-up appointment should their condition change.  Jose Alfredo Mills

## 2019-12-06 NOTE — ED TRIAGE NOTES
Sore throat x 5 days. Seen at [facility] on 12/2, Strep test negative, but still placed on Amoxicillin and steriods. Pt states her throat is no better, actually feels worse on RIGHT side.

## 2019-12-07 LAB
BACTERIA SPEC CULT: NORMAL
SERVICE CMNT-IMP: NORMAL

## 2020-01-02 ENCOUNTER — OFFICE VISIT (OUTPATIENT)
Dept: PRIMARY CARE CLINIC | Age: 45
End: 2020-01-02

## 2020-01-02 VITALS
RESPIRATION RATE: 18 BRPM | DIASTOLIC BLOOD PRESSURE: 85 MMHG | OXYGEN SATURATION: 96 % | HEART RATE: 76 BPM | SYSTOLIC BLOOD PRESSURE: 122 MMHG | TEMPERATURE: 98.1 F | WEIGHT: 139.8 LBS | BODY MASS INDEX: 27.45 KG/M2 | HEIGHT: 60 IN

## 2020-01-02 DIAGNOSIS — F17.200 SMOKING: ICD-10-CM

## 2020-01-02 DIAGNOSIS — I10 ESSENTIAL HYPERTENSION: Primary | ICD-10-CM

## 2020-01-02 NOTE — PROGRESS NOTES
Subjective:     Chief Complaint   Patient presents with    Blood Pressure Check        She  is a 53y.o. year old female is here for follow up on blood pressure. She is currently on Amlodipine 10 mg daily which was increased in last visit. Compliant. Denies any cough, chest pain, soa. She reports that she has been overall feeling good. HA resolved. She continue to smoke about 10 cig/day. She will be working on quitting smoking. Pertinent items are noted in HPI.   Objective:     Vitals:    01/02/20 0851   BP: 122/85   Pulse: 76   Resp: 18   Temp: 98.1 °F (36.7 °C)   TempSrc: Oral   SpO2: 96%   Weight: 139 lb 12.8 oz (63.4 kg)   Height: 5' (1.524 m)       Physical Examination: General appearance - alert, well appearing, and in no distress, oriented to person, place, and time and overweight  Mental status - alert, oriented to person, place, and time, normal mood, behavior, speech, dress, motor activity, and thought processes  Chest - clear to auscultation, no wheezes, rales or rhonchi, symmetric air entry  Heart - normal rate, regular rhythm, normal S1, S2, no murmurs, rubs, clicks or gallops    No Known Allergies   Social History     Socioeconomic History    Marital status:      Spouse name: Not on file    Number of children: Not on file    Years of education: Not on file    Highest education level: Not on file   Tobacco Use    Smoking status: Heavy Tobacco Smoker     Packs/day: 0.50    Smokeless tobacco: Never Used    Tobacco comment: 5 cigs a day   Substance and Sexual Activity    Alcohol use: Yes     Comment: socially    Drug use: No    Sexual activity: Yes     Partners: Male     Birth control/protection: None      Family History   Problem Relation Age of Onset    Hypertension Mother     Stroke Father     Diabetes Father     Migraines Maternal Grandmother     Headache Maternal Grandmother       Past Surgical History:   Procedure Laterality Date    HX ORTHOPAEDIC      bilateral CTS procedure 2009      Past Medical History:   Diagnosis Date    Anxiety disorder     Arthritis     Carpal tunnel syndrome on both sides 2009    Headache     Migraines       Current Outpatient Medications   Medication Sig Dispense Refill    amLODIPine (NORVASC) 10 mg tablet Take 1 Tab by mouth daily. 90 Tab 1    multivitamin (ONE A DAY) tablet Take 1 Tab by mouth daily.  aspirin (ASPIRIN) 325 mg tablet Take 325 mg by mouth daily.  albuterol (PROVENTIL HFA, VENTOLIN HFA, PROAIR HFA) 90 mcg/actuation inhaler Take 1 Puff by inhalation every six (6) hours as needed for Wheezing or Shortness of Breath. 1 Inhaler 0        Assessment/ Plan:   Diagnoses and all orders for this visit:    1. Essential hypertension      - BP well controlled with Amlodipine 10 mg.  2. Smoking     Counseled on quitting smoking. Medication risks/benefits/costs/interactions/alternatives discussed with patient. Advised patient to call back or return to office if symptoms worsen/change/persist. If patient cannot reach us or should anything more severe/urgent arise he/she should proceed directly to the nearest emergency department. Discussed expected course/resolution/complications of diagnosis in detail with patient. Patient given a written after visit summary which includes her diagnoses, current medications and vitals. Patient expressed understanding with the diagnosis and plan. Follow-up and Dispositions    · Return in about 3 months (around 4/2/2020) for complete physical  and fasting blood work., Bring BP log book. Shahnaz Llamas

## 2020-06-22 ENCOUNTER — TELEPHONE (OUTPATIENT)
Dept: PRIMARY CARE CLINIC | Age: 45
End: 2020-06-22

## 2020-06-22 DIAGNOSIS — J20.9 ACUTE BRONCHITIS, UNSPECIFIED ORGANISM: ICD-10-CM

## 2020-06-22 DIAGNOSIS — F17.200 SMOKER: ICD-10-CM

## 2020-06-22 RX ORDER — ALBUTEROL SULFATE 90 UG/1
1 AEROSOL, METERED RESPIRATORY (INHALATION)
Qty: 1 INHALER | Refills: 0 | Status: SHIPPED | OUTPATIENT
Start: 2020-06-22 | End: 2020-07-29 | Stop reason: SDUPTHER

## 2020-06-22 NOTE — TELEPHONE ENCOUNTER
Patient stated she having trouble with her bronchitis. Would like to know if we can send in an inhaler.

## 2020-06-22 NOTE — TELEPHONE ENCOUNTER
Please call patient to make appointment for tomorrow for follow up. I sent the albuterol refill to pharmacy.

## 2020-06-25 ENCOUNTER — VIRTUAL VISIT (OUTPATIENT)
Dept: PRIMARY CARE CLINIC | Age: 45
End: 2020-06-25

## 2020-06-25 DIAGNOSIS — R06.2 WHEEZING: ICD-10-CM

## 2020-06-25 DIAGNOSIS — J20.9 ACUTE BRONCHITIS, UNSPECIFIED ORGANISM: Primary | ICD-10-CM

## 2020-06-25 RX ORDER — PREDNISONE 10 MG/1
TABLET ORAL
Qty: 21 TAB | Refills: 0 | Status: SHIPPED | OUTPATIENT
Start: 2020-06-25 | End: 2020-07-29 | Stop reason: ALTCHOICE

## 2020-06-25 RX ORDER — MONTELUKAST SODIUM 10 MG/1
10 TABLET ORAL DAILY
Qty: 30 TAB | Refills: 3 | Status: SHIPPED | OUTPATIENT
Start: 2020-06-25 | End: 2022-07-28

## 2020-06-25 NOTE — PROGRESS NOTES
Perez Wei is a 50 y.o. female who was seen by synchronous (real-time) audio-video technology on 6/25/2020. Consent: Perez Wei, who was seen by synchronous (real-time) audio-video technology, and/or her healthcare decision maker, is aware that this patient-initiated, Telehealth encounter on 6/25/2020 is a billable service, with coverage as determined by her insurance carrier. She is aware that she may receive a bill and has provided verbal consent to proceed: Yes. Assessment & Plan:   Diagnoses and all orders for this visit:    1. Acute bronchitis, unspecified organism  -   Start   predniSONE (STERAPRED DS) 10 mg dose pack; See administration instruction per 10mg dose pack        Continue Albuterol as needed. Start Singulair. 2. Wheezing  -     predniSONE (STERAPRED DS) 10 mg dose pack; See administration instruction per 10mg dose pack  -     montelukast (SINGULAIR) 10 mg tablet; Take 1 Tab by mouth daily. Follow-up and Dispositions    · Return if symptoms worsen or fail to improve. Subjective:   Perez Wei is a 50 y.o. female who was seen for Cough and Wheezing    She  is a 53y.o. year old female with hx of HTN who presents with a c/o dry  cough , wheezing, soa, chest congestion for the past one week. Reports that after taking albuterol inhaler the pain resolved. Denies any sore throat, fever, chills. No OTC med tried. Patient smokes cigarettes. Prior to Admission medications    Medication Sig Start Date End Date Taking? Authorizing Provider   predniSONE (STERAPRED DS) 10 mg dose pack See administration instruction per 10mg dose pack 6/25/20  Yes Casey De Anda MD   montelukast (SINGULAIR) 10 mg tablet Take 1 Tab by mouth daily.  6/25/20  Yes Casey De Anda MD   albuterol (PROVENTIL HFA, VENTOLIN HFA, PROAIR HFA) 90 mcg/actuation inhaler Take 1 Puff by inhalation every six (6) hours as needed for Wheezing or Shortness of Breath. 6/22/20  Yes Tae Rosario MD amLODIPine (NORVASC) 10 mg tablet Take 1 Tab by mouth daily. 10/29/19  Yes Casey De Anda MD   multivitamin (ONE A DAY) tablet Take 1 Tab by mouth daily. Provider, Historical   aspirin (ASPIRIN) 325 mg tablet Take 325 mg by mouth daily. Provider, Historical     No Known Allergies    Patient Active Problem List   Diagnosis Code    Unilateral occipital headache R51    Sleep disturbance G47.9    Nausea alone R11.0    New daily persistent headache G44.52    Essential hypertension I10     Current Outpatient Medications   Medication Sig Dispense Refill    predniSONE (STERAPRED DS) 10 mg dose pack See administration instruction per 10mg dose pack 21 Tab 0    montelukast (SINGULAIR) 10 mg tablet Take 1 Tab by mouth daily. 30 Tab 3    albuterol (PROVENTIL HFA, VENTOLIN HFA, PROAIR HFA) 90 mcg/actuation inhaler Take 1 Puff by inhalation every six (6) hours as needed for Wheezing or Shortness of Breath. 1 Inhaler 0    amLODIPine (NORVASC) 10 mg tablet Take 1 Tab by mouth daily. 90 Tab 1    multivitamin (ONE A DAY) tablet Take 1 Tab by mouth daily.  aspirin (ASPIRIN) 325 mg tablet Take 325 mg by mouth daily. No Known Allergies  Past Medical History:   Diagnosis Date    Anxiety disorder     Arthritis     Carpal tunnel syndrome on both sides 2009    Headache     Migraines        Review of Systems   Constitutional: Negative for chills and fever. Respiratory: Positive for cough, shortness of breath and wheezing. Objective:   Vital Signs: (As obtained by patient/caregiver at home)  There were no vitals taken for this visit.      [INSTRUCTIONS:  \"[x]\" Indicates a positive item  \"[]\" Indicates a negative item  -- DELETE ALL ITEMS NOT EXAMINED]    Constitutional: [x] Appears well-developed and well-nourished [x] No apparent distress      [] Abnormal -     Mental status: [x] Alert and awake  [x] Oriented to person/place/time [x] Able to follow commands    [] Abnormal -     Eyes:   EOM    [x] Normal    [] Abnormal -   Sclera  [x]  Normal    [] Abnormal -          Discharge [x]  None visible   [] Abnormal -     HENT: [x] Normocephalic, atraumatic  [] Abnormal -   [x] Mouth/Throat: Mucous membranes are moist    External Ears [x] Normal  [] Abnormal -    Neck: [x] No visualized mass [] Abnormal -     Pulmonary/Chest: [x] Respiratory effort normal   [x] No visualized signs of difficulty breathing or respiratory distress        [] Abnormal -      Musculoskeletal:   [x] Normal gait with no signs of ataxia         [x] Normal range of motion of neck        [] Abnormal -     Neurological:        [x] No Facial Asymmetry (Cranial nerve 7 motor function) (limited exam due to video visit)          [x] No gaze palsy        [] Abnormal -          Skin:        [x] No significant exanthematous lesions or discoloration noted on facial skin         [] Abnormal -            Psychiatric:       [x] Normal Affect [] Abnormal -        [] No Hallucinations    Other pertinent observable physical exam findings:-        We discussed the expected course, resolution and complications of the diagnosis(es) in detail. Medication risks, benefits, costs, interactions, and alternatives were discussed as indicated. I advised her to contact the office if her condition worsens, changes or fails to improve as anticipated. She expressed understanding with the diagnosis(es) and plan. Kristen Earl is a 50 y.o. female who was evaluated by a video visit encounter for concerns as above. Patient identification was verified prior to start of the visit. A caregiver was present when appropriate. Due to this being a TeleHealth encounter (During [facility] public health emergency), evaluation of the following organ systems was limited: Vitals/Constitutional/EENT/Resp/CV/GI//MS/Neuro/Skin/Heme-Lymph-Imm.   Pursuant to the emergency declaration under the [facility], 1135 waiver authority and the Coronavirus Preparedness and Response Supplemental Appropriations Act, this Virtual  Visit was conducted, with patient's (and/or legal guardian's) consent, to reduce the patient's risk of exposure to COVID-19 and provide necessary medical care. Services were provided through a video synchronous discussion virtually to substitute for in-person clinic visit.        Davy Monsivais MD

## 2020-07-29 ENCOUNTER — OFFICE VISIT (OUTPATIENT)
Dept: PRIMARY CARE CLINIC | Age: 45
End: 2020-07-29

## 2020-07-29 VITALS
TEMPERATURE: 98.5 F | HEIGHT: 60 IN | WEIGHT: 143 LBS | DIASTOLIC BLOOD PRESSURE: 86 MMHG | OXYGEN SATURATION: 98 % | RESPIRATION RATE: 16 BRPM | BODY MASS INDEX: 28.07 KG/M2 | HEART RATE: 84 BPM | SYSTOLIC BLOOD PRESSURE: 128 MMHG

## 2020-07-29 DIAGNOSIS — Z00.00 WELL ADULT ON ROUTINE HEALTH CHECK: Primary | ICD-10-CM

## 2020-07-29 DIAGNOSIS — R05.9 COUGH: ICD-10-CM

## 2020-07-29 DIAGNOSIS — F17.200 SMOKER: ICD-10-CM

## 2020-07-29 DIAGNOSIS — I10 ESSENTIAL HYPERTENSION: ICD-10-CM

## 2020-07-29 DIAGNOSIS — F41.9 ANXIETY: ICD-10-CM

## 2020-07-29 RX ORDER — PAROXETINE 10 MG/1
10 TABLET, FILM COATED ORAL DAILY
Qty: 30 TAB | Refills: 2 | Status: SHIPPED | OUTPATIENT
Start: 2020-07-29 | End: 2020-09-29

## 2020-07-29 RX ORDER — FLUTICASONE PROPIONATE AND SALMETEROL 250; 50 UG/1; UG/1
1 POWDER RESPIRATORY (INHALATION) EVERY 12 HOURS
Qty: 1 INHALER | Refills: 1 | Status: SHIPPED | OUTPATIENT
Start: 2020-07-29 | End: 2022-07-28

## 2020-07-29 RX ORDER — ALBUTEROL SULFATE 90 UG/1
1 AEROSOL, METERED RESPIRATORY (INHALATION)
Qty: 1 INHALER | Refills: 3 | Status: SHIPPED | OUTPATIENT
Start: 2020-07-29

## 2020-07-29 NOTE — PROGRESS NOTES
Ignacio Grace is a 50 y.o. female    Chief Complaint   Patient presents with    Complete Physical     fasting    Anxiety     patient states she would like to talk to you in regards to her anxiety- it has increased here lately with some life changes.  Medication Refill     needs albuterol inhaler refilled, patient states she has been SOB a lot here lately- especially outside in the heat. 1. Have you been to the ER, urgent care clinic since your last visit? Hospitalized since your last visit? No    2. Have you seen or consulted any other health care providers outside of the [address] [place] [address] since your last visit? Include any pap smears or colon screening. No    No flowsheet data found.      Health Maintenance Due   Topic Date Due    DTaP/Tdap/Td series (1 - Tdap) 03/11/1994    PAP AKA CERVICAL CYTOLOGY  10/18/2020

## 2020-07-29 NOTE — PROGRESS NOTES
Subjective:   50 y.o. female for Well Woman Check up as well as follow up on blood pressure and acute concerns. Her gyne and breast care is done elsewhere by her Ob-Gyne physician in the past but would like to get a new referral.    She has been taking Amlodipine 10 mg. Compliant. No side effects. BP has been well controlled. Has been experiencing anxiety lately due to some social stressors. Feels anxious all the times. Would like to start medications. Needing albuterol every day several times/day for few weeks. Coughing and feels chest tightness. Denies any chest pain. She continue to smoke cigarettes. Would like to get a Ob-gyn referral. Has been having urinary incontinence issues. Patient Active Problem List   Diagnosis Code    Unilateral occipital headache R51    Sleep disturbance G47.9    Nausea alone R11.0    New daily persistent headache G44.52    Essential hypertension I10     Current Outpatient Medications   Medication Sig Dispense Refill    amLODIPine (NORVASC) 10 mg tablet Take 1 Tab by mouth daily. 90 Tab 0    predniSONE (STERAPRED DS) 10 mg dose pack See administration instruction per 10mg dose pack 21 Tab 0    montelukast (SINGULAIR) 10 mg tablet Take 1 Tab by mouth daily. 30 Tab 3    albuterol (PROVENTIL HFA, VENTOLIN HFA, PROAIR HFA) 90 mcg/actuation inhaler Take 1 Puff by inhalation every six (6) hours as needed for Wheezing or Shortness of Breath. 1 Inhaler 0    multivitamin (ONE A DAY) tablet Take 1 Tab by mouth daily.  aspirin (ASPIRIN) 325 mg tablet Take 325 mg by mouth daily.        No Known Allergies  Past Medical History:   Diagnosis Date    Anxiety disorder     Arthritis     Carpal tunnel syndrome on both sides 2009    Headache     Migraines         Lab Results   Component Value Date/Time    WBC 6.7 09/10/2018 03:57 PM    HGB 11.6 09/10/2018 03:57 PM    HCT 36.3 09/10/2018 03:57 PM    PLATELET 568 21/50/9472 03:57 PM    MCV 95 09/10/2018 03:57 PM     Lab Results   Component Value Date/Time    Cholesterol, total 160 09/10/2018 03:57 PM    HDL Cholesterol 67 09/10/2018 03:57 PM    LDL, calculated 60 09/10/2018 03:57 PM    Triglyceride 166 (H) 09/10/2018 03:57 PM     Lab Results   Component Value Date/Time    GFR est non- 09/10/2018 03:57 PM    GFR est  09/10/2018 03:57 PM    Creatinine 0.59 09/10/2018 03:57 PM    BUN 6 09/10/2018 03:57 PM    Sodium 138 09/10/2018 03:57 PM    Potassium 4.1 09/10/2018 03:57 PM    Chloride 103 09/10/2018 03:57 PM    CO2 22 09/10/2018 03:57 PM    Magnesium 2.1 06/15/2015 09:32 AM     Lab Results   Component Value Date/Time    Hemoglobin A1c 5.0 09/10/2018 03:57 PM         ROS: Feeling generally well except HPI. No TIA's or unusual headaches, no dysphagia. No prolonged cough. No dyspnea or chest pain on exertion. No abdominal pain, change in bowel habits, black or bloody stools. No new or unusual musculoskeletal symptoms. Specific concerns today: refer to HPI. .    Objective: The patient appears well, alert, oriented x 3, in no distress. Visit Vitals  /86 (BP 1 Location: Left arm, BP Patient Position: Sitting)   Pulse 84   Temp 98.5 °F (36.9 °C) (Temporal)   Resp 16   Ht 5' (1.524 m)   Wt 143 lb (64.9 kg)   SpO2 98%   BMI 27.93 kg/m²     ENT normal.  Neck supple. No adenopathy or thyromegaly. JENARO. Lungs are clear, good air entry, no wheezes, rhonchi or rales. S1 and S2 normal, no murmurs, regular rate and rhythm. Abdomen soft without tenderness, guarding, mass or organomegaly. Extremities show no edema, normal peripheral pulses. Neurological is normal, no focal findings. Breast and Pelvic exams are deferred. Assessment/Plan:   Well Woman  lose weight, increase physical activity, stop smoking (advice and handout given), follow low fat diet, follow low salt diet, routine labs ordered, call if any problems    ICD-10-CM ICD-9-CM    1.  Well adult on routine health check  Z00.00 V70.0 THYROID CASCADE PROFILE HEMOGLOBIN A1C WITH EAG      LIPID PANEL      CBC WITH AUTOMATED DIFF      METABOLIC PANEL, COMPREHENSIVE      REFERRAL TO OBSTETRICS AND GYNECOLOGY   2. Essential hypertension  E17 378.4 METABOLIC PANEL, COMPREHENSIVE   3. Cough  R05 786.2 PULMONARY FUNCTION TEST      fluticasone propion-salmeteroL (ADVAIR/WIXELA) 250-50 mcg/dose diskus inhaler   4. Smoker  F17.200 305.1 PULMONARY FUNCTION TEST      fluticasone propion-salmeteroL (ADVAIR/WIXELA) 250-50 mcg/dose diskus inhaler   5. Anxiety  F41.9 300.00 PARoxetine (PAXIL) 10 mg tablet     Diagnoses and all orders for this visit:    1. Well adult on routine health check  -     THYROID CASCADE PROFILE  -     HEMOGLOBIN A1C WITH EAG  -     LIPID PANEL  -     CBC WITH AUTOMATED DIFF  -     METABOLIC PANEL, COMPREHENSIVE  -     REFERRAL TO OBSTETRICS AND GYNECOLOGY    2. Cough  -     PULMONARY FUNCTION TEST; Future  -    Start  fluticasone propion-salmeteroL (ADVAIR/WIXELA) 250-50 mcg/dose diskus inhaler; Take 1 Puff by inhalation every twelve (12) hours. -     albuterol (PROVENTIL HFA, VENTOLIN HFA, PROAIR HFA) 90 mcg/actuation inhaler; Take 1 Puff by inhalation every six (6) hours as needed for Wheezing or Shortness of Breath. 3. Anxiety  -   Counseling provided. start  PARoxetine (PAXIL) 10 mg tablet; Take 1 Tab by mouth daily. 4. Essential hypertension  -   BP well controlled with Amlodipine    METABOLIC PANEL, COMPREHENSIVE    5. Smoker  -     PULMONARY FUNCTION TEST; Future  -     fluticasone propion-salmeteroL (ADVAIR/WIXELA) 250-50 mcg/dose diskus inhaler; Take 1 Puff by inhalation every twelve (12) hours. -     albuterol (PROVENTIL HFA, VENTOLIN HFA, PROAIR HFA) 90 mcg/actuation inhaler; Take 1 Puff by inhalation every six (6) hours as needed for Wheezing or Shortness of Breath. Strongly encouraged to quit smoking.     Follow-up and Dispositions    · Return in about 2 months (around 9/29/2020), or if symptoms worsen or fail to improve, for anxiety. .       reviewed diet, exercise and weight control  very strongly urged to quit smoking to reduce cardiovascular risk  reviewed medications and side effects in detail

## 2020-07-30 ENCOUNTER — TELEPHONE (OUTPATIENT)
Dept: PRIMARY CARE CLINIC | Age: 45
End: 2020-07-30

## 2020-07-30 DIAGNOSIS — R05.9 COUGH: ICD-10-CM

## 2020-07-30 DIAGNOSIS — F17.200 SMOKER: ICD-10-CM

## 2020-07-30 DIAGNOSIS — Z13.9 SCREENING PROCEDURE: Primary | ICD-10-CM

## 2020-07-30 LAB
ALBUMIN SERPL-MCNC: 4.5 G/DL (ref 3.8–4.8)
ALBUMIN/GLOB SERPL: 1.8 {RATIO} (ref 1.2–2.2)
ALP SERPL-CCNC: 69 IU/L (ref 39–117)
ALT SERPL-CCNC: 14 IU/L (ref 0–32)
AST SERPL-CCNC: 17 IU/L (ref 0–40)
BASOPHILS # BLD AUTO: 0 X10E3/UL (ref 0–0.2)
BASOPHILS NFR BLD AUTO: 1 %
BILIRUB SERPL-MCNC: 0.5 MG/DL (ref 0–1.2)
BUN SERPL-MCNC: 10 MG/DL (ref 6–24)
BUN/CREAT SERPL: 14 (ref 9–23)
CALCIUM SERPL-MCNC: 9.6 MG/DL (ref 8.7–10.2)
CHLORIDE SERPL-SCNC: 101 MMOL/L (ref 96–106)
CHOLEST SERPL-MCNC: 205 MG/DL (ref 100–199)
CO2 SERPL-SCNC: 22 MMOL/L (ref 20–29)
CREAT SERPL-MCNC: 0.73 MG/DL (ref 0.57–1)
EOSINOPHIL # BLD AUTO: 0.4 X10E3/UL (ref 0–0.4)
EOSINOPHIL NFR BLD AUTO: 8 %
ERYTHROCYTE [DISTWIDTH] IN BLOOD BY AUTOMATED COUNT: 12.3 % (ref 11.7–15.4)
EST. AVERAGE GLUCOSE BLD GHB EST-MCNC: 97 MG/DL
GLOBULIN SER CALC-MCNC: 2.5 G/DL (ref 1.5–4.5)
GLUCOSE SERPL-MCNC: 90 MG/DL (ref 65–99)
HBA1C MFR BLD: 5 % (ref 4.8–5.6)
HCT VFR BLD AUTO: 39.7 % (ref 34–46.6)
HDLC SERPL-MCNC: 65 MG/DL
HGB BLD-MCNC: 13.3 G/DL (ref 11.1–15.9)
IMM GRANULOCYTES # BLD AUTO: 0 X10E3/UL (ref 0–0.1)
IMM GRANULOCYTES NFR BLD AUTO: 0 %
LDLC SERPL CALC-MCNC: 96 MG/DL (ref 0–99)
LYMPHOCYTES # BLD AUTO: 1.5 X10E3/UL (ref 0.7–3.1)
LYMPHOCYTES NFR BLD AUTO: 30 %
MCH RBC QN AUTO: 31.4 PG (ref 26.6–33)
MCHC RBC AUTO-ENTMCNC: 33.5 G/DL (ref 31.5–35.7)
MCV RBC AUTO: 94 FL (ref 79–97)
MONOCYTES # BLD AUTO: 0.5 X10E3/UL (ref 0.1–0.9)
MONOCYTES NFR BLD AUTO: 9 %
NEUTROPHILS # BLD AUTO: 2.5 X10E3/UL (ref 1.4–7)
NEUTROPHILS NFR BLD AUTO: 52 %
PLATELET # BLD AUTO: 327 X10E3/UL (ref 150–450)
POTASSIUM SERPL-SCNC: 4.3 MMOL/L (ref 3.5–5.2)
PROT SERPL-MCNC: 7 G/DL (ref 6–8.5)
RBC # BLD AUTO: 4.24 X10E6/UL (ref 3.77–5.28)
SODIUM SERPL-SCNC: 139 MMOL/L (ref 134–144)
TRIGL SERPL-MCNC: 221 MG/DL (ref 0–149)
TSH SERPL DL<=0.005 MIU/L-ACNC: 1.15 UIU/ML (ref 0.45–4.5)
VLDLC SERPL CALC-MCNC: 44 MG/DL (ref 5–40)
WBC # BLD AUTO: 4.8 X10E3/UL (ref 3.4–10.8)

## 2020-07-30 RX ORDER — ALBUTEROL SULFATE 90 UG/1
1 AEROSOL, METERED RESPIRATORY (INHALATION)
Qty: 1 INHALER | Refills: 3 | OUTPATIENT
Start: 2020-07-30

## 2020-07-30 NOTE — TELEPHONE ENCOUNTER
Patient called the office and states she needs a RX for the COVID test to be done at [facility]. She is having the pulmonary function test done there on 8/7/2020 and was instructed to have the COVID test done 4 days prior to the procedure. I have called [facility] to see if there is anything specific for the order that needs to be placed since we haven't placed any COVID orders before. LVM for someone to return call. Please advise if you know what needs to be done.

## 2020-07-31 NOTE — TELEPHONE ENCOUNTER
Patient states she was told that she needed to have test done at [facility] since that is where her procedure is going to be done at, this information was given to her by central scheduling and the order has to be placed by PCP, according to her she is not supposed to go anywhere else but [facility]. I have called the [facility] nurse help line but still currently waiting on a return call since we have never placed an order.

## 2020-08-03 ENCOUNTER — TELEPHONE (OUTPATIENT)
Dept: PRIMARY CARE CLINIC | Age: 45
End: 2020-08-03

## 2020-08-03 NOTE — TELEPHONE ENCOUNTER
Please call patient that order in the chart. I think she can go to [facility] in [facility]. Make sure they have access to the order.

## 2020-08-03 NOTE — TELEPHONE ENCOUNTER
Patient notified order was placed and she verbalized her understanding. States she will call central scheduling now to see what other steps she can do to have test done today.

## 2020-08-03 NOTE — TELEPHONE ENCOUNTER
Tried to LVM for patient, it stated \"message canceled thank you for calling\" while I was in the middle of LVM. I will try back again later. Patient's order was placed in the system and she can call the [facility] [phone] to schedule testing or call to ask what she needs to do for the drive up testing at [facility]. I tried to call JJ and they were no help.

## 2020-08-03 NOTE — PROGRESS NOTES
Please inform patient:    Triglyceride level is mildly higher than from normal range and LDL level significantly went up from last time. Please work on diet and exercise as we have discussed. We will recheck in 6 months. Otherwise CBC, kidney, liver, thyroid level is normal.  No diabetes.

## 2020-08-03 NOTE — TELEPHONE ENCOUNTER
Pt calling to speak with Taina. She thinks that she was called due to the COVID test she is supposed to go take.

## 2020-08-21 ENCOUNTER — HOSPITAL ENCOUNTER (OUTPATIENT)
Dept: PREADMISSION TESTING | Age: 45
Discharge: HOME | End: 2020-08-21
Payer: MEDICAID

## 2020-08-21 DIAGNOSIS — Z01.812 PRE-PROCEDURE LAB EXAM: ICD-10-CM

## 2020-08-21 PROCEDURE — 87635 SARS-COV-2 COVID-19 AMP PRB: CPT

## 2020-08-22 LAB — SARS-COV-2, COV2NT: NOT DETECTED

## 2020-08-26 ENCOUNTER — TELEPHONE (OUTPATIENT)
Dept: PRIMARY CARE CLINIC | Age: 45
End: 2020-08-26

## 2020-08-26 NOTE — TELEPHONE ENCOUNTER
----- Message from Stefania Kerr MD sent at 8/24/2020  8:02 AM EDT -----  Please inform that COVID test came back negative.

## 2020-08-28 ENCOUNTER — HOSPITAL ENCOUNTER (OUTPATIENT)
Dept: PULMONOLOGY | Age: 45
Discharge: HOME | End: 2020-08-28
Attending: FAMILY MEDICINE
Payer: MEDICAID

## 2020-08-28 DIAGNOSIS — F17.200 SMOKER: ICD-10-CM

## 2020-08-28 DIAGNOSIS — R05.9 COUGH: ICD-10-CM

## 2020-08-28 PROCEDURE — 94010 BREATHING CAPACITY TEST: CPT

## 2020-09-17 NOTE — PROCEDURES
[address]   PULMONARY FUNCTION TEST    Name:  Carolina Luong  MR#:  [id number]  :  1975  ACCOUNT #:  [de-identified]  DATE OF SERVICE:  2020      REQUESTING PHYSICIAN:  Ryan Rossi MD    DIAGNOSIS:  Cough. ATS criteria for reproducibility and acceptability was met. SPIROMETRY:  FEV1/FVC ratio is 78, which is normal.  FEV1 is 2.92 L which is 131% predicted, which is normal.  FVC is 3.70 L which is 127% predicted, which is also normal.    Flow volume loop is normal.    INTERPRETATION:  Normal spirometry. Normal lung volumes. Clinical correlation advised.         Gay Cunningham MD [email or web address]  D:  2020 13:12  T:  2020 15:20  JOB #:  [id number]  CC:  Ryan Rossi MD

## 2020-09-18 ENCOUNTER — TELEPHONE (OUTPATIENT)
Dept: PRIMARY CARE CLINIC | Age: 45
End: 2020-09-18

## 2020-09-21 NOTE — TELEPHONE ENCOUNTER
Returned call to patient. Advised of test results. Patient verbally stated that she understood.   Did not have any questions

## 2020-09-22 ENCOUNTER — TELEPHONE (OUTPATIENT)
Dept: PRIMARY CARE CLINIC | Age: 45
End: 2020-09-22

## 2020-09-22 NOTE — PROGRESS NOTES
Please call patient ( attempted to call in the past)       Lung function test came back in normal range. Continue meds as advised.

## 2020-09-25 ENCOUNTER — OFFICE VISIT (OUTPATIENT)
Dept: OBGYN CLINIC | Age: 45
End: 2020-09-25
Payer: MEDICAID

## 2020-09-25 VITALS
DIASTOLIC BLOOD PRESSURE: 88 MMHG | BODY MASS INDEX: 28.23 KG/M2 | HEIGHT: 60 IN | WEIGHT: 143.8 LBS | SYSTOLIC BLOOD PRESSURE: 144 MMHG

## 2020-09-25 DIAGNOSIS — N39.3 STRESS INCONTINENCE OF URINE: Primary | ICD-10-CM

## 2020-09-25 DIAGNOSIS — N32.81 OAB (OVERACTIVE BLADDER): ICD-10-CM

## 2020-09-25 DIAGNOSIS — N81.9 FEMALE GENITAL PROLAPSE, UNSPECIFIED TYPE: ICD-10-CM

## 2020-09-25 PROCEDURE — 99203 OFFICE O/P NEW LOW 30 MIN: CPT | Performed by: OBSTETRICS & GYNECOLOGY

## 2020-09-25 NOTE — PROGRESS NOTES
Urinary Incontinence    Charlie Her is a 50 y.o. female who presents today with for evaluation of urinary incontinence. Both stress and urge symptoms, worsening for the past year. Leakage of urine with coughs/sneeze/laugh? yes    Sudden urgency w/ inability to reach bathroom in time? yes    Sensation of incomplete voiding? no    H/o recent or recurrent UTI? not in several years    Hematuria, dysuria, nocturia? denies    Excessive fluid or caffeine intake? no, drinks 1-2 cups of coffee in the AM    Other bladder irritants (carbonated beverages, artificial sweeteners, etoh, tobacco, etc)? yes - coffee and tobacco (heavy smoker)    Modifying or alleviating factors: none    Other associated symptoms (ie-constipation, pelvic pain): none    Prior evaluation of urinary incontinence? no    Pt works as  for [employer]. Ob/Gyn Hx:    x4  LMP-spotting 8/15  Menses- irregular  Contraception-denies  STI-denies  ? SA-    Health maintenance:  [id number], reports wnl  Mammo-2019 B1      Past Medical History:   Diagnosis Date    Anxiety disorder     Arthritis     Carpal tunnel syndrome on both sides 2009    Headache     Hypertension     Migraines        Past Surgical History:   Procedure Laterality Date    HX ORTHOPAEDIC  2009    bilateral CTS procedure    HX OTHER SURGICAL      tubes in ears as child       Family History   Problem Relation Age of Onset    Hypertension Mother     Stroke Father     Diabetes Father     Migraines Maternal Grandmother     Headache Maternal Grandmother        Social History     Socioeconomic History    Marital status:      Spouse name: Not on file    Number of children: Not on file    Years of education: Not on file    Highest education level: Not on file   Occupational History    Not on file   Social Needs    Financial resource strain: Not on file    Food insecurity     Worry: Not on file     Inability: Not on file    Transportation needs     Medical: Not on file     Non-medical: Not on file   Tobacco Use    Smoking status: Heavy Tobacco Smoker     Packs/day: 0.50    Smokeless tobacco: Never Used    Tobacco comment: 5 cigs a day   Substance and Sexual Activity    Alcohol use: Yes     Comment: socially    Drug use: No    Sexual activity: Not Currently     Partners: Male     Birth control/protection: None   Lifestyle    Physical activity     Days per week: Not on file     Minutes per session: Not on file    Stress: Not on file   Relationships    Social connections     Talks on phone: Not on file     Gets together: Not on file     Attends Faith service: Not on file     Active member of club or organization: Not on file     Attends meetings of clubs or organizations: Not on file     Relationship status: Not on file    Intimate partner violence     Fear of current or ex partner: Not on file     Emotionally abused: Not on file     Physically abused: Not on file     Forced sexual activity: Not on file   Other Topics Concern    Not on file   Social History Narrative    Not on file       Current Outpatient Medications   Medication Sig Dispense Refill    PARoxetine (PAXIL) 10 mg tablet Take 1 Tab by mouth daily. 30 Tab 2    albuterol (PROVENTIL HFA, VENTOLIN HFA, PROAIR HFA) 90 mcg/actuation inhaler Take 1 Puff by inhalation every six (6) hours as needed for Wheezing or Shortness of Breath. 1 Inhaler 3    amLODIPine (NORVASC) 10 mg tablet Take 1 Tab by mouth daily. 90 Tab 0    montelukast (SINGULAIR) 10 mg tablet Take 1 Tab by mouth daily. 30 Tab 3    multivitamin (ONE A DAY) tablet Take 1 Tab by mouth daily.  aspirin (ASPIRIN) 325 mg tablet Take 325 mg by mouth daily.  fluticasone propion-salmeteroL (ADVAIR/WIXELA) 250-50 mcg/dose diskus inhaler Take 1 Puff by inhalation every twelve (12) hours.  1 Inhaler 1       No Known Allergies    Review of Systems - History obtained from the patient  Constitutional: negative for weight loss, fever, night sweats  HEENT: negative for hearing loss, earache, congestion, snoring, sorethroat  CV: negative for chest pain, palpitations, edema  Resp: negative for cough, shortness of breath, wheezing  GI: negative for change in bowel habits, abdominal pain, black or bloody stools  : negative for vaginal discharge +urinary incontinence  MSK: negative for back pain, joint pain, muscle pain  Breast: negative for breast lumps, nipple discharge, galactorrhea  Skin :negative for itching, rash, hives  Neuro: negative for dizziness, headache, confusion, weakness  Psych: negative for anxiety, depression, change in mood  Heme/lymph: negative for bleeding, bruising, pallor    Physical Exam    Visit Vitals  BP (!) 144/88 (BP 1 Location: Right arm, BP Patient Position: Sitting)   Ht 5' (1.524 m)   Wt 143 lb 12.8 oz (65.2 kg)   LMP 08/15/2020   BMI 28.08 kg/m²       Constitutional  · Appearance: well-nourished, well developed, alert, in no acute distress    HENT  · Head and Face: appears normal    Gastrointestinal  · Abdominal Examination: abdomen non-tender to palpation, normal bowel sounds, no masses present  · Liver and spleen: no hepatomegaly present, spleen not palpable  · Hernias: no hernias identified    Genitourinary  · External Genitalia: normal appearance for age, no discharge present, no tenderness present, no inflammatory lesions present, no masses present, no atrophy present  · Vagina: +cystocele/rectocele as well as apical prolapse, no leakage with valsalva, no discharge present, no inflammatory lesions present, no masses present  · Bladder: non-tender to palpation  · Urethra: appears normal  · Cervix: normal, no cervicitis, no CMT  · Uterus: normal size, shape and consistency, mobile  · Adnexa: no adnexal tenderness present, no adnexal masses present  · Perineum: perineum within normal limits, no evidence of trauma, no rashes or skin lesions present    Skin  · General Inspection: no rash, no lesions identified    Neurologic/Psychiatric  · Mental Status:  · Orientation: grossly oriented to person, place and time  · Mood and Affect: mood normal, affect appropriate      Urine dipstick shows negative for all components. Urine dark in color (tea-colored)    Assessment/Plan:  50 y.o. female with PREETI and OAB symptoms.     PREETI:  -formal urinalysis sent  -bladder diary to establish pattern of incontinence  -discussed conservative management with pessaries, pelvic PT  -could consider UDT and definitive surgical management with sling if symptoms do not improve or become more bothersome to patient    OAB:  -lifestyle modifications-weight loss, smoking cessation, decrease intake of fluids/caffeine/etoh/bladder irritants, management of constipation  -scheduled voids  -refer for pelvic PT, bladder retraining   -could consider antimuscarinic therapy if not improving with conservative measures (ie-oxybutinin, tolterodine, fesoterodine, etc, but would need to  on SE's)    RTC: for AE or sooner prn    Mac Nolen MD  9/25/2020  4:09 PM

## 2020-09-26 LAB
APPEARANCE UR: CLEAR
BACTERIA #/AREA URNS HPF: NORMAL /[HPF]
BILIRUB UR QL STRIP: NEGATIVE
CASTS URNS QL MICRO: NORMAL /LPF
COLOR UR: YELLOW
EPI CELLS #/AREA URNS HPF: NORMAL /HPF (ref 0–10)
GLUCOSE UR QL: NEGATIVE
HGB UR QL STRIP: NEGATIVE
KETONES UR QL STRIP: NEGATIVE
LEUKOCYTE ESTERASE UR QL STRIP: NEGATIVE
MICRO URNS: NORMAL
MICRO URNS: NORMAL
MUCOUS THREADS URNS QL MICRO: PRESENT
NITRITE UR QL STRIP: NEGATIVE
PH UR STRIP: 6 [PH] (ref 5–7.5)
PROT UR QL STRIP: NEGATIVE
RBC #/AREA URNS HPF: NORMAL /HPF (ref 0–2)
SP GR UR: 1.02 (ref 1–1.03)
UROBILINOGEN UR STRIP-MCNC: 0.2 MG/DL (ref 0.2–1)
WBC #/AREA URNS HPF: NORMAL /HPF (ref 0–5)

## 2020-09-29 ENCOUNTER — OFFICE VISIT (OUTPATIENT)
Dept: PRIMARY CARE CLINIC | Age: 45
End: 2020-09-29
Payer: MEDICAID

## 2020-09-29 VITALS
OXYGEN SATURATION: 99 % | SYSTOLIC BLOOD PRESSURE: 130 MMHG | HEART RATE: 87 BPM | RESPIRATION RATE: 18 BRPM | TEMPERATURE: 98.4 F | DIASTOLIC BLOOD PRESSURE: 85 MMHG | HEIGHT: 60 IN | BODY MASS INDEX: 27.68 KG/M2 | WEIGHT: 141 LBS

## 2020-09-29 DIAGNOSIS — F17.200 SMOKER: ICD-10-CM

## 2020-09-29 DIAGNOSIS — F41.9 ANXIETY: Primary | ICD-10-CM

## 2020-09-29 DIAGNOSIS — Z23 NEEDS FLU SHOT: ICD-10-CM

## 2020-09-29 DIAGNOSIS — J41.0 SMOKERS' COUGH (HCC): ICD-10-CM

## 2020-09-29 DIAGNOSIS — I10 ESSENTIAL HYPERTENSION: ICD-10-CM

## 2020-09-29 PROCEDURE — 90471 IMMUNIZATION ADMIN: CPT | Performed by: FAMILY MEDICINE

## 2020-09-29 PROCEDURE — 90686 IIV4 VACC NO PRSV 0.5 ML IM: CPT | Performed by: FAMILY MEDICINE

## 2020-09-29 PROCEDURE — 99214 OFFICE O/P EST MOD 30 MIN: CPT | Performed by: FAMILY MEDICINE

## 2020-09-29 RX ORDER — PAROXETINE HYDROCHLORIDE 20 MG/1
20 TABLET, FILM COATED ORAL DAILY
Qty: 30 TAB | Refills: 2 | Status: SHIPPED | OUTPATIENT
Start: 2020-09-29 | End: 2021-01-12 | Stop reason: SDUPTHER

## 2020-09-29 RX ORDER — AMLODIPINE BESYLATE 10 MG/1
10 TABLET ORAL DAILY
Qty: 90 TAB | Refills: 0 | Status: SHIPPED | OUTPATIENT
Start: 2020-09-29 | End: 2021-01-12 | Stop reason: SDUPTHER

## 2020-09-29 NOTE — PROGRESS NOTES
Nick Hu is a 50 y.o. female  Chief Complaint   Patient presents with    Hypertension    Anxiety     Health Maintenance Due   Topic Date Due    DTaP/Tdap/Td series (1 - Tdap) 03/11/1994    Flu Vaccine (1) 09/01/2020    PAP AKA CERVICAL CYTOLOGY  10/18/2020     Visit Vitals  BP (!) 140/82 (BP 1 Location: Left arm, BP Patient Position: Sitting)   Pulse 87   Temp 98.4 °F (36.9 °C) (Temporal)   Resp 18   Ht 5' (1.524 m)   Wt 141 lb (64 kg)   SpO2 99%   BMI 27.54 kg/m²     1. Have you been to the ER, urgent care clinic since your last visit? Hospitalized since your last visit? No    2. Have you seen or consulted any other health care providers outside of the [address] [place] [address] since your last visit? Include any pap smears or colon screening.  No  Went to OBGYN Dr. Quentin Thorpe

## 2020-09-29 NOTE — PROGRESS NOTES
Subjective:     Chief Complaint   Patient presents with    Hypertension    Anxiety        She  is a 50y.o. year old female who presents today for follow up on anxiety and blood pressure. She is currently on Amlodipine 10 mg daily. BP still has been high. Denies any chest pain, soa. Anxiety: she was started on Paxil 10 mg two months ago. Reports that her symptoms are not better. States that her social stressor are not getting any better and that's why her anxiety level is always high. Patient has chronic cough but no wheezing. Recent PFT was normal.  She is currently using Advair but not regularly. She continue to smoke . Pertinent items are noted in HPI.   Objective:     Vitals:    09/29/20 1014 09/29/20 1035   BP: (!) 140/82 130/85   Pulse: 87    Resp: 18    Temp: 98.4 °F (36.9 °C)    TempSrc: Temporal    SpO2: 99%    Weight: 141 lb (64 kg)    Height: 5' (1.524 m)        Physical Examination: General appearance - alert, well appearing, and in no distress, oriented to person, place, and time and overweight  Mental status - alert, oriented to person, place, and time, normal mood, behavior, speech, dress, motor activity, and thought processes  Chest - clear to auscultation, no wheezes, rales or rhonchi, symmetric air entry  Heart - normal rate, regular rhythm, normal S1, S2, no murmurs, rubs, clicks or gallops    No Known Allergies   Social History     Socioeconomic History    Marital status:      Spouse name: Not on file    Number of children: Not on file    Years of education: Not on file    Highest education level: Not on file   Tobacco Use    Smoking status: Light Tobacco Smoker     Packs/day: 0.50    Smokeless tobacco: Never Used    Tobacco comment: 5 cigs a day   Substance and Sexual Activity    Alcohol use: Yes     Comment: socially    Drug use: No    Sexual activity: Not Currently     Partners: Male     Birth control/protection: None      Family History   Problem Relation Age of Onset    Hypertension Mother     Stroke Father     Diabetes Father     Migraines Maternal Grandmother     Headache Maternal Grandmother       Past Surgical History:   Procedure Laterality Date    HX ORTHOPAEDIC  2009    bilateral CTS procedure    HX OTHER SURGICAL      tubes in ears as child      Past Medical History:   Diagnosis Date    Anxiety disorder     Arthritis     Carpal tunnel syndrome on both sides 2009    Headache     Hypertension     Migraines       Current Outpatient Medications   Medication Sig Dispense Refill    fluticasone propion-salmeteroL (ADVAIR/WIXELA) 250-50 mcg/dose diskus inhaler Take 1 Puff by inhalation every twelve (12) hours. 1 Inhaler 1    PARoxetine (PAXIL) 10 mg tablet Take 1 Tab by mouth daily. 30 Tab 2    albuterol (PROVENTIL HFA, VENTOLIN HFA, PROAIR HFA) 90 mcg/actuation inhaler Take 1 Puff by inhalation every six (6) hours as needed for Wheezing or Shortness of Breath. 1 Inhaler 3    amLODIPine (NORVASC) 10 mg tablet Take 1 Tab by mouth daily. 90 Tab 0    montelukast (SINGULAIR) 10 mg tablet Take 1 Tab by mouth daily. 30 Tab 3    multivitamin (ONE A DAY) tablet Take 1 Tab by mouth daily.  aspirin (ASPIRIN) 325 mg tablet Take 325 mg by mouth daily. Assessment/ Plan:   Diagnoses and all orders for this visit:    1. Anxiety  -  Increase  PARoxetine (PAXIL) 20 mg tablet; Take 1 Tab by mouth daily. 2. Essential hypertension  -   Continue   amLODIPine (NORVASC) 10 mg tablet; Take 1 Tab by mouth daily. 3. Needs flu shot  -     INFLUENZA VIRUS VAC QUAD,SPLIT,PRESV FREE SYRINGE IM    4. Smoker       Counseled to work on quitting smoking. 5. Smokers' cough (HCC)       Continue Advair and Singulair. Medication risks/benefits/costs/interactions/alternatives discussed with patient.   Advised patient to call back or return to office if symptoms worsen/change/persist. If patient cannot reach us or should anything more severe/urgent arise he/she should proceed directly to the nearest emergency department. Discussed expected course/resolution/complications of diagnosis in detail with patient. Patient given a written after visit summary which includes her diagnoses, current medications and vitals. Patient expressed understanding with the diagnosis and plan. Follow-up and Dispositions    · Return in about 3 months (around 12/29/2020), or if symptoms worsen or fail to improve, for Bring BP log book. , anxiety.

## 2020-11-03 ENCOUNTER — OFFICE VISIT (OUTPATIENT)
Dept: OBGYN CLINIC | Age: 45
End: 2020-11-03
Payer: MEDICAID

## 2020-11-03 VITALS
DIASTOLIC BLOOD PRESSURE: 86 MMHG | SYSTOLIC BLOOD PRESSURE: 132 MMHG | HEIGHT: 60 IN | BODY MASS INDEX: 27.29 KG/M2 | WEIGHT: 139 LBS

## 2020-11-03 DIAGNOSIS — N32.81 OAB (OVERACTIVE BLADDER): ICD-10-CM

## 2020-11-03 DIAGNOSIS — Z46.89 FITTING AND ADJUSTMENT OF PESSARY: Primary | ICD-10-CM

## 2020-11-03 DIAGNOSIS — N39.3 SUI (STRESS URINARY INCONTINENCE, FEMALE): ICD-10-CM

## 2020-11-03 PROCEDURE — 57160 INSERT PESSARY/OTHER DEVICE: CPT | Performed by: OBSTETRICS & GYNECOLOGY

## 2020-11-03 NOTE — PROGRESS NOTES
Emergency Department Nursing Plan of Care       The Nursing Plan of Care is developed from the Nursing assessment and Emergency Department Attending provider initial evaluation. The plan of care may be reviewed in the ED Provider note.     The Plan of Care was developed with the following considerations:   Patient / Family readiness to learn indicated by:verbalized understanding  Persons(s) to be included in education: patient  Barriers to Learning/Limitations:No    Signed     Lily Up RN    8/6/2019   5:47 PM Procedure note: Fitting and insertion of pessary    Indications:  Matthew Matt is a 50 y.o. female [address] No obstetric history on file. who presents for a pessary fitting. She desires a pessary as her means of controlling her symptoms of pelvic organ prolapse and stress urinary incontinence. She understands the care needed for a pessary and desires to proceed. Alternative treatment options have been discussed and the patient states she understands. Procedure: The patient was placed in dorsal lithotomy position. Examination confirms the above noted defect/s. A #3 ringed pessary w/ support was fitted without difficulty. The patient subsequently ambulated, voided and performed valsalva maneuvers without dislodging the pessary and without discomfort. Care instructions were provided. Patient was discharged to home in stable condition. Advised pt monitor for any vaginal bleeding, foul discharge or pain symptoms after she begins using pessary. Advised pt return to office for pessary check 1 week after she begins to use device. Understands this will not help with urge symptoms, and advised Pelvic PT for bladder retraining to help with OAB symptoms.

## 2020-11-03 NOTE — PATIENT INSTRUCTIONS
Pessary: Care Instructions  Your Care Instructions     A pessary is a device that fits into your vagina and supports the pelvic organs. It may be used if a pelvic organ sags or moves out of its normal position (prolapse). For some women, wearing a pessary means that they may not have to have surgery to fix a prolapse. A pessary also may help a woman who has trouble controlling her urine (incontinence). Or a pessary may be used during a pregnancy to hold the uterus in place. There are many sizes and types of pessaries. Which type you use depends on the problem you have. Your doctor will make sure the pessary is just right for you. You may need to try different kinds of pessaries to find the best fit. The pessary should hold the pelvic organ in place without causing pain or pressure. You may be able to have sex with your pessary in place. It depends on the type of pessary and your comfort level. Follow-up care is a key part of your treatment and safety. Be sure to make and go to all appointments, and call your doctor if you are having problems. It's also a good idea to know your test results and keep a list of the medicines you take. How can you care for yourself at home? · If you get a vaginal discharge while you have a pessary, talk to your doctor about ways to reduce the discharge and smell. A pessary, in some cases, rubs the vagina and may cause irritation and discharge. If your vagina feels sore, talk to your doctor about a cream or gel to protect the vagina. · Follow your doctor's advice on how long you can wear your pessary before it needs to be cleaned. You may be able to remove and clean it yourself. Or your doctor may want to do this during an office visit. · If you clean your pessary, wash it with mild soap and water. Follow your doctor's advice on inserting the pessary. · Do not douche or use a vaginal wash unless your doctor tells you to do so. · Do not smoke.  Smoking can cause a cough, which makes a prolapse worse. If you need help quitting, talk to your doctor about stop-smoking programs and medicines. These can increase your chances of quitting for good. To help support your pelvic organs  · Avoid activities that put pressure on your pelvic muscles, such as heavy lifting. · Do pelvic floor (Kegel) exercises, which tighten and strengthen pelvic muscles. To do Kegel exercises:  ? Squeeze the same muscles you would use to stop your urine. Your belly and thighs should not move. ? Hold the squeeze for 3 seconds, and then relax for 3 seconds. ? Start with 3 seconds. Then add 1 second each week until you are able to squeeze for 10 seconds. ? Repeat the exercise 10 to 15 times a session. Do three or more sessions each day. · To ease pressure on your vagina, lie down and put a pillow under your knees. You also can lie on your side and bring your knees up to your chest.  When should you call for help? Call your doctor now or seek immediate medical care if:    · You have vaginal discharge that has increased in amount or smells bad.     · You have new or worse belly or pelvic pain. Watch closely for changes in your health, and be sure to contact your doctor if:    · You have problems with the pessary, such as vaginal pain, vaginal bleeding, or problems with urination or bowel movements. Where can you learn more? Go to http://www.gray.com/  Enter X480 in the search box to learn more about \"Pessary: Care Instructions. \"  Current as of: November 8, 2019               Content Version: 12.6  © 4241-8335 [employer]. Care instructions adapted under license by [employer] (which disclaims liability or warranty for this information). If you have questions about a medical condition or this instruction, always ask your healthcare professional. [address] any warranty or liability for your use of this information. What Type Of Note Output Would You Prefer (Optional)?: Standard Output How Severe Is Your Skin Lesion?: moderate Has Your Skin Lesion Been Treated?: not been treated Is This A New Presentation, Or A Follow-Up?: Skin Lesions

## 2021-01-12 ENCOUNTER — OFFICE VISIT (OUTPATIENT)
Dept: PRIMARY CARE CLINIC | Age: 46
End: 2021-01-12
Payer: MEDICAID

## 2021-01-12 VITALS
TEMPERATURE: 98.4 F | HEART RATE: 95 BPM | BODY MASS INDEX: 26.31 KG/M2 | WEIGHT: 134 LBS | OXYGEN SATURATION: 98 % | HEIGHT: 60 IN | DIASTOLIC BLOOD PRESSURE: 100 MMHG | SYSTOLIC BLOOD PRESSURE: 158 MMHG | RESPIRATION RATE: 16 BRPM

## 2021-01-12 DIAGNOSIS — F41.9 ANXIETY: ICD-10-CM

## 2021-01-12 DIAGNOSIS — I10 ESSENTIAL HYPERTENSION: Primary | ICD-10-CM

## 2021-01-12 DIAGNOSIS — Z12.31 ENCOUNTER FOR SCREENING MAMMOGRAM FOR MALIGNANT NEOPLASM OF BREAST: ICD-10-CM

## 2021-01-12 PROCEDURE — 99214 OFFICE O/P EST MOD 30 MIN: CPT | Performed by: FAMILY MEDICINE

## 2021-01-12 RX ORDER — HYDROCHLOROTHIAZIDE 25 MG/1
25 TABLET ORAL DAILY
Qty: 90 TAB | Refills: 0 | Status: SHIPPED | OUTPATIENT
Start: 2021-01-12 | End: 2021-07-28 | Stop reason: SDUPTHER

## 2021-01-12 RX ORDER — AMLODIPINE BESYLATE 10 MG/1
10 TABLET ORAL DAILY
Qty: 90 TAB | Refills: 1 | Status: SHIPPED | OUTPATIENT
Start: 2021-01-12 | End: 2021-07-28 | Stop reason: SDUPTHER

## 2021-01-12 RX ORDER — PAROXETINE HYDROCHLORIDE 20 MG/1
20 TABLET, FILM COATED ORAL DAILY
Qty: 90 TAB | Refills: 1 | Status: SHIPPED | OUTPATIENT
Start: 2021-01-12 | End: 2021-07-28 | Stop reason: SDUPTHER

## 2021-01-12 NOTE — PROGRESS NOTES
Subjective:     Chief Complaint   Patient presents with    Follow-up     BP check    Anxiety        She  is a 50y.o. year old female who presents today for follow up on anxiety and blood pressure.     She is currently on Amlodipine 10 mg daily. BP still has been high. Denies any chest pain, soa.     Anxiety: In last visit Paxil was increased to 20 mg. Reports that her symptoms are better. Feeling over all better.     She continue to smoke but cut down she states.       Pertinent items are noted in HPI.   Objective:     Vitals:    01/12/21 0818 01/12/21 0846 01/12/21 0853   BP: (!) 167/114 (!) 160/100 (!) 158/100   Pulse: 95     Resp: 16     Temp: 98.4 °F (36.9 °C)     TempSrc: Temporal     SpO2: 98%     Weight: 134 lb (60.8 kg)     Height: 5' (1.524 m)         Physical Examination: General appearance - alert, well appearing, and in no distress, oriented to person, place, and time and overweight  Mental status - alert, oriented to person, place, and time, normal mood, behavior, speech, dress, motor activity, and thought processes  Chest - clear to auscultation, no wheezes, rales or rhonchi, symmetric air entry  Heart - normal rate, regular rhythm, normal S1, S2, no murmurs, rubs, clicks or gallops    No Known Allergies   Social History     Socioeconomic History    Marital status:      Spouse name: Not on file    Number of children: Not on file    Years of education: Not on file    Highest education level: Not on file   Tobacco Use    Smoking status: Light Tobacco Smoker     Packs/day: 0.50    Smokeless tobacco: Never Used    Tobacco comment: 5 cigs a day   Substance and Sexual Activity    Alcohol use: Yes     Comment: socially    Drug use: No    Sexual activity: Not Currently     Partners: Male     Birth control/protection: None      Family History   Problem Relation Age of Onset    Hypertension Mother     Stroke Father     Diabetes Father     Migraines Maternal Grandmother     Headache Maternal Grandmother       Past Surgical History:   Procedure Laterality Date    HX ORTHOPAEDIC  2009    bilateral CTS procedure    HX OTHER SURGICAL      tubes in ears as child      Past Medical History:   Diagnosis Date    Anxiety disorder     Arthritis     Carpal tunnel syndrome on both sides 2009    Headache     Hypertension     Migraines       Current Outpatient Medications   Medication Sig Dispense Refill    PARoxetine (PAXIL) 20 mg tablet Take 1 Tab by mouth daily. 30 Tab 2    amLODIPine (NORVASC) 10 mg tablet Take 1 Tab by mouth daily. 90 Tab 0    albuterol (PROVENTIL HFA, VENTOLIN HFA, PROAIR HFA) 90 mcg/actuation inhaler Take 1 Puff by inhalation every six (6) hours as needed for Wheezing or Shortness of Breath. 1 Inhaler 3    multivitamin (ONE A DAY) tablet Take 1 Tab by mouth daily.  aspirin (ASPIRIN) 325 mg tablet Take 325 mg by mouth daily.  fluticasone propion-salmeteroL (ADVAIR/WIXELA) 250-50 mcg/dose diskus inhaler Take 1 Puff by inhalation every twelve (12) hours. 1 Inhaler 1    montelukast (SINGULAIR) 10 mg tablet Take 1 Tab by mouth daily. 30 Tab 3        Assessment/ Plan:   Diagnoses and all orders for this visit:    1. Essential hypertension  -    Repeat BP is high. Start  hydroCHLOROthiazide (HYDRODIURIL) 25 mg tablet; Take 1 Tab by mouth daily. -   Continue   amLODIPine (NORVASC) 10 mg tablet; Take 1 Tab by mouth daily. Low salt diet, exercise, quit smoking. 2. Anxiety  -   Well controlled with PARoxetine (PAXIL) 20 mg tablet; Take 1 Tab by mouth daily. 3. Encounter for screening mammogram for malignant neoplasm of breast  -     [facility] 3D KERI W MAMMO BI SCREENING INCL CAD; Future     advised patient make appointment with Ob for pap. Spent  50 % time counseling and decision making. Medication risks/benefits/costs/interactions/alternatives discussed with patient.   Advised patient to call back or return to office if symptoms worsen/change/persist. If patient cannot reach us or should anything more severe/urgent arise he/she should proceed directly to the nearest emergency department. Discussed expected course/resolution/complications of diagnosis in detail with patient. Patient given a written after visit summary which includes her diagnoses, current medications and vitals. Patient expressed understanding with the diagnosis and plan. Follow-up and Dispositions    · Return in about 2 months (around 3/12/2021) for Bring BP log book. Bryan Chisholm

## 2021-01-12 NOTE — PROGRESS NOTES
Chief Complaint   Patient presents with    Follow-up     BP check     1. Have you been to the ER, urgent care clinic since your last visit? Hospitalized since your last visit? No    2. Have you seen or consulted any other health care providers outside of the [address] [place] [address] since your last visit? Include any pap smears or colon screening.  no

## 2021-02-17 ENCOUNTER — OFFICE VISIT (OUTPATIENT)
Dept: OBGYN CLINIC | Age: 46
End: 2021-02-17
Payer: MEDICAID

## 2021-02-17 ENCOUNTER — HOSPITAL ENCOUNTER (OUTPATIENT)
Dept: MAMMOGRAPHY | Age: 46
Discharge: HOME | End: 2021-02-17
Attending: FAMILY MEDICINE

## 2021-02-17 VITALS
SYSTOLIC BLOOD PRESSURE: 146 MMHG | BODY MASS INDEX: 26.11 KG/M2 | DIASTOLIC BLOOD PRESSURE: 98 MMHG | HEIGHT: 60 IN | WEIGHT: 133 LBS

## 2021-02-17 DIAGNOSIS — Z11.3 SCREENING EXAMINATION FOR VENEREAL DISEASE: ICD-10-CM

## 2021-02-17 DIAGNOSIS — N95.1 MENOPAUSAL SYNDROME: ICD-10-CM

## 2021-02-17 DIAGNOSIS — Z72.51 UNPROTECTED SEX: Primary | ICD-10-CM

## 2021-02-17 DIAGNOSIS — N39.3 SUI (STRESS URINARY INCONTINENCE, FEMALE): ICD-10-CM

## 2021-02-17 DIAGNOSIS — Z11.51 SPECIAL SCREENING EXAMINATION FOR HUMAN PAPILLOMAVIRUS (HPV): ICD-10-CM

## 2021-02-17 DIAGNOSIS — Z01.419 WELL WOMAN EXAM: ICD-10-CM

## 2021-02-17 DIAGNOSIS — Z12.31 ENCOUNTER FOR SCREENING MAMMOGRAM FOR MALIGNANT NEOPLASM OF BREAST: ICD-10-CM

## 2021-02-17 DIAGNOSIS — N92.6 IRREGULAR MENSES: ICD-10-CM

## 2021-02-17 LAB
HCG URINE, QL. (POC): NEGATIVE
VALID INTERNAL CONTROL?: YES

## 2021-02-17 PROCEDURE — 99396 PREV VISIT EST AGE 40-64: CPT | Performed by: OBSTETRICS & GYNECOLOGY

## 2021-02-17 PROCEDURE — 81025 URINE PREGNANCY TEST: CPT | Performed by: OBSTETRICS & GYNECOLOGY

## 2021-02-17 RX ORDER — ASPIRIN 81 MG/1
325 TABLET ORAL DAILY
COMMUNITY
Start: 2022-08-31

## 2021-02-17 NOTE — PATIENT INSTRUCTIONS
Pessary: Care Instructions  Your Care Instructions     A pessary is a device that fits into your vagina and supports the pelvic organs. It may be used if a pelvic organ sags or moves out of its normal position (prolapse). For some women, wearing a pessary means that they may not have to have surgery to fix a prolapse. A pessary also may help a woman who has trouble controlling her urine (incontinence). Or a pessary may be used during a pregnancy to hold the uterus in place. There are many sizes and types of pessaries. Which type you use depends on the problem you have. Your doctor will make sure the pessary is just right for you. You may need to try different kinds of pessaries to find the best fit. The pessary should hold the pelvic organ in place without causing pain or pressure. You may be able to have sex with your pessary in place. It depends on the type of pessary and your comfort level. Follow-up care is a key part of your treatment and safety. Be sure to make and go to all appointments, and call your doctor if you are having problems. It's also a good idea to know your test results and keep a list of the medicines you take. How can you care for yourself at home? · If you get a vaginal discharge while you have a pessary, talk to your doctor about ways to reduce the discharge and smell. A pessary, in some cases, rubs the vagina and may cause irritation and discharge. If your vagina feels sore, talk to your doctor about a cream or gel to protect the vagina. · Follow your doctor's advice on how long you can wear your pessary before it needs to be cleaned. You may be able to remove and clean it yourself. Or your doctor may want to do this during an office visit. · If you clean your pessary, wash it with mild soap and water. Follow your doctor's advice on inserting the pessary. · Do not douche or use a vaginal wash unless your doctor tells you to do so. · Do not smoke.  Smoking can cause a cough, which makes a prolapse worse. If you need help quitting, talk to your doctor about stop-smoking programs and medicines. These can increase your chances of quitting for good. To help support your pelvic organs  · Avoid activities that put pressure on your pelvic muscles, such as heavy lifting. · Do pelvic floor (Kegel) exercises, which tighten and strengthen pelvic muscles. To do Kegel exercises:  ? Squeeze the same muscles you would use to stop your urine. Your belly and thighs should not move. ? Hold the squeeze for 3 seconds, and then relax for 3 seconds. ? Start with 3 seconds. Then add 1 second each week until you are able to squeeze for 10 seconds. ? Repeat the exercise 10 to 15 times a session. Do three or more sessions each day. · To ease pressure on your vagina, lie down and put a pillow under your knees. You also can lie on your side and bring your knees up to your chest.  When should you call for help? Call your doctor now or seek immediate medical care if:    · You have vaginal discharge that has increased in amount or smells bad.     · You have new or worse belly or pelvic pain. Watch closely for changes in your health, and be sure to contact your doctor if:    · You have problems with the pessary, such as vaginal pain, vaginal bleeding, or problems with urination or bowel movements. Where can you learn more? Go to http://www.gray.com/  Enter X480 in the search box to learn more about \"Pessary: Care Instructions. \"  Current as of: November 8, 2019               Content Version: 12.6  © 6416-4889 [employer]. Care instructions adapted under license by [employer] (which disclaims liability or warranty for this information). If you have questions about a medical condition or this instruction, always ask your healthcare professional. [address] any warranty or liability for your use of this information.          Pelvic Exam: Care Instructions  Your Care Instructions     When your doctor examines all of your pelvic organs, it's called a pelvic exam. Two good reasons to have this kind of exam are to check for sexually transmitted infections (STIs) and to get a Pap test. A Pap test is also called a Pap smear. It checks for early changes that can lead to cancer of the cervix. Sometimes a pelvic exam is part of a regular checkup. Your doctor may ask you to avoid vaginal sex, tampons, vaginal medicines, vaginal sprays or powders, and douching for 1 to 2 days before the test.  Other times, women have this kind of exam at any time of the month. This is because they have pelvic pain, bleeding, or discharge. Or they may have another pelvic problem. Before your exam, it's important to share some information with your doctor. For example, if you are a survivor of rape or sexual abuse, you can talk about any concerns you may have. Your doctor will also want to know if you are pregnant or use birth control. And he or she will want to hear about any problems, surgeries, or procedures you have had in your pelvic area. You will also need to tell your doctor when your last period was. Follow-up care is a key part of your treatment and safety. Be sure to make and go to all appointments, and call your doctor if you are having problems. It's also a good idea to know your test results and keep a list of the medicines you take. How is a pelvic exam done? · During a pelvic exam, you will:  ? Take off your clothes below the waist. You will get a paper or cloth cover to put over the lower half of your body. If this is regular checkup, you may undress completely and put on a gown. ? Lie on your back on an exam table. Your feet will be raised above you. Stirrups will support your feet. · The doctor will:  ? Ask you to relax your knees. Your knees need to lean out, toward the walls. ? Check the opening of your vagina for sores or swelling.   ? Gently put a tool called a speculum into your vagina. It opens the vagina a little bit. You will feel some pressure. But if you are relaxed, it will not hurt. It lets your doctor see inside the vagina. ? Use a small brush, spatula, or swab to get a sample of cells, if you are having a Pap test or culture. The doctor then removes the speculum. ? Put on gloves and put one or two fingers of one hand into your vagina. The other hand goes on your lower belly. This lets your doctor feel your pelvic organs. You will probably feel some pressure. Try to stay relaxed. ? Put one gloved finger into your rectum and one into your vagina, if needed. This can also help check your pelvic organs. This exam takes about 10 minutes. At the end, you will get a washcloth or tissue to clean your vaginal area. You can then get dressed. Why is a pelvic exam done? A pelvic exam may be done:  · As part of a woman's regular physical checkup. The exam may include a Pap test.  · To check for vaginal infection. · To check for sexually transmitted infections, such as chlamydia or herpes. · To help find the cause of abnormal uterine bleeding. · To look for problems like uterine fibroids, ovarian cysts, or uterine prolapse. · To find the cause of pelvic or belly pain. · Before inserting an intrauterine device (IUD) for birth control. · To collect evidence if you've been sexually assaulted. What are the risks of a pelvic exam?  There is a small chance that the doctor will find something on a pelvic exam that would not have caused a problem. This is called overdiagnosis. It could lead to tests or treatment you don't need. When should you call for help? Watch closely for changes in your health, and be sure to contact your doctor if you have any problems. Where can you learn more? Go to http://www.gray.com/  Enter M421 in the search box to learn more about \"Pelvic Exam: Care Instructions. \"  Current as of: November 8, 5135               ZKRDFQF Version: 12.6  © 8146-0481 [employer]. Care instructions adapted under license by [employer] (which disclaims liability or warranty for this information). If you have questions about a medical condition or this instruction, always ask your healthcare professional. [address] any warranty or liability for your use of this information.

## 2021-02-17 NOTE — PROGRESS NOTES
Annual Exam    Jose F Mccarthy is a 50 y.o. female who presents today for annual exam.     Incontinence symptoms have improved, but still desires trial of incontinence pessary. Menses irregular, and pt with hot flashes for past several years. Desires UPT today as unprotected IC in January. Pt works as  for [employer]. Ob/Gyn Hx:    x4  LMP-spotting 8/15  Menses- irregular  Contraception-denies  STI-denies  ? SA-yes    Health maintenance:  [id number], reports wnl  Mammo-10/19/2019 B1      Past Medical History:   Diagnosis Date    Anxiety disorder     Arthritis     Carpal tunnel syndrome on both sides 2009    Headache     Hypertension     Migraines        Past Surgical History:   Procedure Laterality Date    HX ORTHOPAEDIC  2009    bilateral CTS procedure    HX OTHER SURGICAL      tubes in ears as child       Family History   Problem Relation Age of Onset    Hypertension Mother     Stroke Father     Diabetes Father     Migraines Maternal Grandmother     Headache Maternal Grandmother        Social History     Socioeconomic History    Marital status:      Spouse name: Not on file    Number of children: Not on file    Years of education: Not on file    Highest education level: Not on file   Occupational History    Not on file   Social Needs    Financial resource strain: Not on file    Food insecurity     Worry: Not on file     Inability: Not on file    Transportation needs     Medical: Not on file     Non-medical: Not on file   Tobacco Use    Smoking status: Light Tobacco Smoker     Packs/day: 0.50    Smokeless tobacco: Never Used    Tobacco comment: 5 cigs a day   Substance and Sexual Activity    Alcohol use: Yes     Comment: socially    Drug use: No    Sexual activity: Not Currently     Partners: Male     Birth control/protection: None   Lifestyle    Physical activity     Days per week: Not on file     Minutes per session: Not on file    Stress: Not on file Relationships    Social connections     Talks on phone: Not on file     Gets together: Not on file     Attends Alevism service: Not on file     Active member of club or organization: Not on file     Attends meetings of clubs or organizations: Not on file     Relationship status: Not on file    Intimate partner violence     Fear of current or ex partner: Not on file     Emotionally abused: Not on file     Physically abused: Not on file     Forced sexual activity: Not on file   Other Topics Concern    Not on file   Social History Narrative    Not on file       Current Outpatient Medications   Medication Sig Dispense Refill    hydroCHLOROthiazide (HYDRODIURIL) 25 mg tablet Take 1 Tab by mouth daily. 90 Tab 0    amLODIPine (NORVASC) 10 mg tablet Take 1 Tab by mouth daily. 90 Tab 1    PARoxetine (PAXIL) 20 mg tablet Take 1 Tab by mouth daily. 90 Tab 1    fluticasone propion-salmeteroL (ADVAIR/WIXELA) 250-50 mcg/dose diskus inhaler Take 1 Puff by inhalation every twelve (12) hours. 1 Inhaler 1    albuterol (PROVENTIL HFA, VENTOLIN HFA, PROAIR HFA) 90 mcg/actuation inhaler Take 1 Puff by inhalation every six (6) hours as needed for Wheezing or Shortness of Breath. 1 Inhaler 3    montelukast (SINGULAIR) 10 mg tablet Take 1 Tab by mouth daily. 30 Tab 3    multivitamin (ONE A DAY) tablet Take 1 Tab by mouth daily.  aspirin (ASPIRIN) 325 mg tablet Take 325 mg by mouth daily.          No Known Allergies    Review of Systems - History obtained from the patient  Constitutional: negative for weight loss, fever, night sweats  HEENT: negative for hearing loss, earache, congestion, snoring, sorethroat  CV: negative for chest pain, palpitations, edema  Resp: negative for cough, shortness of breath, wheezing  GI: negative for change in bowel habits, abdominal pain, black or bloody stools  : negative for vaginal discharge +urinary incontinence, irregular menses  MSK: negative for back pain, joint pain, muscle pain  Breast: negative for breast lumps, nipple discharge, galactorrhea  Skin :negative for itching, rash, hives  Neuro: negative for dizziness, headache, confusion, weakness  Psych: negative for anxiety, depression, change in mood  Heme/lymph: negative for bleeding, bruising, pallor    Physical Exam  Visit Vitals  BP (!) 146/98   Ht 5' (1.524 m)   Wt 133 lb (60.3 kg)   BMI 25.97 kg/m²     PHYSICAL EXAMINATION  Constitutional  · Appearance: well-nourished, well developed, alert, in no acute distress    HENT  · Head and Face: appears normal    Neck  · Inspection/Palpation: normal appearance, no masses or tenderness  · Lymph Nodes: no lymphadenopathy present  · Thyroid: gland size normal, nontender, no nodules or masses present on palpation    Chest  · Respiratory Effort: breathing labored  · Auscultation: normal breath sounds    Cardiovascular  · Heart:  · Auscultation: regular rate and rhythm without murmur    Breasts  · Inspection of Breasts: breasts symmetrical, no skin changes, no discharge present, nipple appearance normal, no skin retraction present  · Palpation of Breasts and Axillae: no masses present on palpation, no breast tenderness  · Axillary Lymph Nodes: no lymphadenopathy present    Gastrointestinal  · Abdominal Examination: abdomen non-tender to palpation, normal bowel sounds, no masses present  · Liver and spleen: no hepatomegaly present, spleen not palpable  · Hernias: no hernias identified    Genitourinary  · External Genitalia: normal appearance for age, no discharge present, no tenderness present, no inflammatory lesions present, no masses present, no atrophy present  · Vagina: mild cystocele and apical prolapse, no discharge present, no inflammatory lesions present, no masses present  · Bladder: non-tender to palpatio  · Urethra: appears normal  · Cervix: normal   · Uterus: normal size, shape and consistency  · Adnexa: no adnexal tenderness present, no adnexal masses present  · Perineum: perineum within normal limits, no evidence of trauma, no rashes or skin lesions present  · Anus: anus within normal limits, no hemorrhoids present  · Inguinal Lymph Nodes: no lymphadenopathy present    Skin  · General Inspection: no rash, no lesions identified    Neurologic/Psychiatric  · Mental Status:  · Orientation: grossly oriented to person, place and time  · Mood and Affect: mood normal, affect appropriate    UPT neg today    Assessment/Plan:  50 y.o. female presenting for annual exam. +PREETI, mild cystocele. Irregular menses and VMS likely c/w perimenopause. UPT neg today.     Health maintenance:  -diet/exercise/healthy lifestyle  -pap/HPV today  -STI screen (endocervix)  -refer for mammography  -refer for colonoscopy  -trial of pessary as discussed at prior visit (pessary provided today)--> will need to remove for pessary check if she begins using this consistently    RTC: for AE or sooner clary Lucero MD  2/17/2021  3:43 PM

## 2021-02-22 LAB
C TRACH RRNA CVX QL NAA+PROBE: NEGATIVE
CYTOLOGIST CVX/VAG CYTO: NORMAL
CYTOLOGY CVX/VAG DOC CYTO: NORMAL
CYTOLOGY CVX/VAG DOC THIN PREP: NORMAL
DX ICD CODE: NORMAL
HPV I/H RISK 4 DNA CVX QL PROBE+SIG AMP: NEGATIVE
Lab: NORMAL
N GONORRHOEA RRNA CVX QL NAA+PROBE: NEGATIVE
OTHER STN SPEC: NORMAL
STAT OF ADQ CVX/VAG CYTO-IMP: NORMAL
T VAGINALIS RRNA SPEC QL NAA+PROBE: NEGATIVE

## 2021-07-28 ENCOUNTER — OFFICE VISIT (OUTPATIENT)
Dept: PRIMARY CARE CLINIC | Age: 46
End: 2021-07-28
Payer: MEDICAID

## 2021-07-28 VITALS
HEART RATE: 92 BPM | BODY MASS INDEX: 26.5 KG/M2 | RESPIRATION RATE: 18 BRPM | WEIGHT: 135 LBS | TEMPERATURE: 98.1 F | HEIGHT: 60 IN | DIASTOLIC BLOOD PRESSURE: 90 MMHG | SYSTOLIC BLOOD PRESSURE: 140 MMHG | OXYGEN SATURATION: 99 %

## 2021-07-28 DIAGNOSIS — F41.9 ANXIETY: ICD-10-CM

## 2021-07-28 DIAGNOSIS — M54.16 LUMBAR RADICULOPATHY: ICD-10-CM

## 2021-07-28 DIAGNOSIS — Z71.6 ENCOUNTER FOR SMOKING CESSATION COUNSELING: ICD-10-CM

## 2021-07-28 DIAGNOSIS — I10 ESSENTIAL HYPERTENSION: Primary | ICD-10-CM

## 2021-07-28 PROCEDURE — 99214 OFFICE O/P EST MOD 30 MIN: CPT | Performed by: FAMILY MEDICINE

## 2021-07-28 RX ORDER — VARENICLINE TARTRATE 25 MG
KIT ORAL
Qty: 1 DOSE PACK | Refills: 0 | Status: SHIPPED | OUTPATIENT
Start: 2021-07-28 | End: 2022-07-28

## 2021-07-28 RX ORDER — PAROXETINE HYDROCHLORIDE 20 MG/1
20 TABLET, FILM COATED ORAL DAILY
Qty: 90 TABLET | Refills: 1 | Status: SHIPPED | OUTPATIENT
Start: 2021-07-28 | End: 2022-03-01 | Stop reason: SDUPTHER

## 2021-07-28 RX ORDER — LOSARTAN POTASSIUM 25 MG/1
25 TABLET ORAL DAILY
Qty: 90 TABLET | Refills: 0 | Status: SHIPPED | OUTPATIENT
Start: 2021-07-28 | End: 2021-09-28 | Stop reason: SDUPTHER

## 2021-07-28 RX ORDER — AMLODIPINE BESYLATE 10 MG/1
10 TABLET ORAL DAILY
Qty: 90 TABLET | Refills: 1 | Status: SHIPPED | OUTPATIENT
Start: 2021-07-28 | End: 2021-09-28 | Stop reason: SDUPTHER

## 2021-07-28 RX ORDER — HYDROCHLOROTHIAZIDE 25 MG/1
25 TABLET ORAL DAILY
Qty: 90 TABLET | Refills: 0 | Status: SHIPPED | OUTPATIENT
Start: 2021-07-28 | End: 2021-09-28 | Stop reason: SDUPTHER

## 2021-07-28 RX ORDER — LOSARTAN POTASSIUM 25 MG/1
25 TABLET ORAL DAILY
Qty: 90 TABLET | Refills: 0 | Status: SHIPPED | OUTPATIENT
Start: 2021-07-28 | End: 2021-07-28 | Stop reason: SDUPTHER

## 2021-07-28 NOTE — PROGRESS NOTES
Subjective:     Chief Complaint   Patient presents with    Hypertension    Anxiety    Leg Pain     pt states, is becoming worse. right leg pain believes it is sciatic. She  is a 48y.o. year old female who presents today for follow up on anxiety and blood pressure as well as with concerns. Patient reports that she has been experiencing pain in her right buttock with a radiating pain intermittently. She is not having any pain today. She had MRI done in the past which showed arthritis per patient. Would like to see an orthopedics.      She is currently on Amlodipine 10 mg and HCTZ 25 mg . HCTZ was added 6 months ago. BP still has been high. Denies any chest pain, soa. Does not check BP at home.      Anxiety: Currently on Paxil 20 mg. Reports that her symptoms are better. Feeling over all better.      She continue to smoke . Would like to get help to quit smoking.        Pertinent items are noted in HPI.   Objective:     Vitals:    07/28/21 1602   BP: (!) 150/87   Pulse: 92   Resp: 18   Temp: 98.1 °F (36.7 °C)   TempSrc: Oral   SpO2: 99%   Weight: 135 lb (61.2 kg)   Height: 5' (1.524 m)       Physical Examination: General appearance - alert, well appearing, and in no distress, oriented to person, place, and time and overweight  Mental status - alert, oriented to person, place, and time, normal mood, behavior, speech, dress, motor activity, and thought processes  Chest - clear to auscultation, no wheezes, rales or rhonchi, symmetric air entry  Heart - normal rate, regular rhythm, normal S1, S2, no murmurs, rubs, clicks or gallops  Extremities - no pedal edema noted    No Known Allergies   Social History     Socioeconomic History    Marital status:      Spouse name: Not on file    Number of children: Not on file    Years of education: Not on file    Highest education level: Not on file   Tobacco Use    Smoking status: Light Tobacco Smoker     Packs/day: 0.50    Smokeless tobacco: Never Used  Tobacco comment: 5 cigs a day   Vaping Use    Vaping Use: Never used   Substance and Sexual Activity    Alcohol use: Not Currently     Comment: socially    Drug use: No    Sexual activity: Yes     Partners: Male     Birth control/protection: None     Social Determinants of Health     Financial Resource Strain:     Difficulty of Paying Living Expenses:    Food Insecurity:     Worried About Running Out of Food in the Last Year:     [address] in the Last Year:    Transportation Needs:     Lack of Transportation (Medical):  Lack of Transportation (Non-Medical):    Physical Activity:     Days of Exercise per Week:     Minutes of Exercise per Session:    Stress:     Feeling of Stress :    Social Connections:     Frequency of Communication with Friends and Family:     Frequency of Social Gatherings with Friends and Family:     Attends Pentecostalism Services:     Active Member of Clubs or Organizations:     Attends Club or Organization Meetings:     Marital Status:       Family History   Problem Relation Age of Onset    Hypertension Mother     Stroke Father     Diabetes Father     Migraines Maternal Grandmother     Headache Maternal Grandmother       Past Surgical History:   Procedure Laterality Date    HX ORTHOPAEDIC  2009    bilateral CTS procedure    HX OTHER SURGICAL      tubes in ears as child      Past Medical History:   Diagnosis Date    Anxiety disorder     Arthritis     Carpal tunnel syndrome on both sides 2009    Headache     Hypertension     Migraines       Current Outpatient Medications   Medication Sig Dispense Refill    aspirin delayed-release 81 mg tablet Take  by mouth daily.  omeprazole magnesium (PRILOSEC PO) Take  by mouth.  cholecalciferol, vitamin D3, (VITAMIN D3 PO) Take  by mouth.  hydroCHLOROthiazide (HYDRODIURIL) 25 mg tablet Take 1 Tab by mouth daily. 90 Tab 0    amLODIPine (NORVASC) 10 mg tablet Take 1 Tab by mouth daily.  90 Tab 1    PARoxetine (PAXIL) 20 mg tablet Take 1 Tab by mouth daily. 90 Tab 1    albuterol (PROVENTIL HFA, VENTOLIN HFA, PROAIR HFA) 90 mcg/actuation inhaler Take 1 Puff by inhalation every six (6) hours as needed for Wheezing or Shortness of Breath. 1 Inhaler 3    multivitamin (ONE A DAY) tablet Take 1 Tab by mouth daily.  fluticasone propion-salmeteroL (ADVAIR/WIXELA) 250-50 mcg/dose diskus inhaler Take 1 Puff by inhalation every twelve (12) hours. (Patient not taking: Reported on 7/28/2021) 1 Inhaler 1    montelukast (SINGULAIR) 10 mg tablet Take 1 Tab by mouth daily. (Patient not taking: Reported on 7/28/2021) 30 Tab 3    aspirin (ASPIRIN) 325 mg tablet Take 325 mg by mouth daily. (Patient not taking: Reported on 7/28/2021)          Assessment/ Plan:   Diagnoses and all orders for this visit:    1. Essential hypertension  -     METABOLIC PANEL, BASIC; Future  -    Continue hydroCHLOROthiazide (HYDRODIURIL) 25 mg tablet; Take 1 Tablet by mouth daily.  -    Start  losartan (COZAAR) 25 mg tablet; Take 1 Tablet by mouth daily. -   Continue   amLODIPine (NORVASC) 10 mg tablet; Take 1 Tablet by mouth daily. 2. Encounter for smoking cessation counseling  -     varenicline (CHANTIX STARTER RICKY) 0.5 mg (11)- 1 mg (42) DsPk; Follow pack instruction. Discussed side effects. 3. Anxiety  -   Well controlled with   PARoxetine (PAXIL) 20 mg tablet; Take 1 Tablet by mouth daily. 4. Lumbar radiculopathy  -     She is not having any pain currently. REFERRAL TO ORTHOPEDICS           Medication risks/benefits/costs/interactions/alternatives discussed with patient. Advised patient to call back or return to office if symptoms worsen/change/persist. If patient cannot reach us or should anything more severe/urgent arise he/she should proceed directly to the nearest emergency department. Discussed expected course/resolution/complications of diagnosis in detail with patient.   Patient given a written after visit summary which includes her diagnoses, current medications and vitals. Patient expressed understanding with the diagnosis and plan. Follow-up and Dispositions    · Return in about 2 months (around 9/28/2021) for Bring BP log book. Talha Underwood

## 2021-07-28 NOTE — PROGRESS NOTES
Room 9     Identified pt with two pt identifiers(name and ). Reviewed record in preparation for visit and have obtained necessary documentation. All patient medications has been reviewed. Chief Complaint   Patient presents with    Hypertension    Anxiety    Leg Pain     pt states, is becoming worse. right leg pain believes it is sciatic.        3 most recent PHQ Screens 2021   Little interest or pleasure in doing things Not at all   Feeling down, depressed, irritable, or hopeless Not at all   Total Score PHQ 2 0   Trouble falling or staying asleep, or sleeping too much -   Feeling tired or having little energy -   Poor appetite, weight loss, or overeating -   Feeling bad about yourself - or that you are a failure or have let yourself or your family down -   Trouble concentrating on things such as school, work, reading, or watching TV -   Moving or speaking so slowly that other people could have noticed; or the opposite being so fidgety that others notice -   Thoughts of being better off dead, or hurting yourself in some way -   PHQ 9 Score -   How difficult have these problems made it for you to do your work, take care of your home and get along with others -     No flowsheet data found. Health Maintenance Due   Topic    Hepatitis C Screening     COVID-19 Vaccine (1)    DTaP/Tdap/Td series (1 - Tdap)    Colorectal Cancer Screening Combo          Health Maintenance Review: Patient reminded of \"due or due soon\" health maintenance. I have asked the patient to contact his/her primary care provider (PCP) for follow-up on his/her health maintenance.     Vitals:    21 1602   BP: (!) 150/87   Pulse: 92   Resp: 18   Temp: 98.1 °F (36.7 °C)   TempSrc: Oral   SpO2: 99%   Weight: 135 lb (61.2 kg)   Height: 5' (1.524 m)   PainSc:   0 - No pain       Wt Readings from Last 3 Encounters:   21 135 lb (61.2 kg)   21 133 lb (60.3 kg)   21 134 lb (60.8 kg)     Temp Readings from Last 3 Encounters:   07/28/21 98.1 °F (36.7 °C) (Oral)   01/12/21 98.4 °F (36.9 °C) (Temporal)   09/29/20 98.4 °F (36.9 °C) (Temporal)     BP Readings from Last 3 Encounters:   07/28/21 (!) 150/87   02/17/21 (!) 146/98   01/12/21 (!) 158/100     Pulse Readings from Last 3 Encounters:   07/28/21 92   01/12/21 95   09/29/20 87       Coordination of Care Questionnaire:   1) Have you been to an emergency room, urgent care, or hospitalized since your last visit?   no       2. Have seen or consulted any other health care provider since your last visit? NO    Advance Care Planning:   End of Life Planning: has NO advanced directive - not interested in additional information    Patient is accompanied by self I have received verbal consent from [facility] to discuss any/all medical information while they are present in the room.

## 2021-08-25 ENCOUNTER — HOSPITAL ENCOUNTER (OUTPATIENT)
Dept: MAMMOGRAPHY | Age: 46
Discharge: HOME | End: 2021-08-25
Attending: FAMILY MEDICINE
Payer: MEDICAID

## 2021-08-25 PROCEDURE — 77063 BREAST TOMOSYNTHESIS BI: CPT

## 2021-08-26 LAB
BUN SERPL-MCNC: 10 MG/DL (ref 6–24)
BUN/CREAT SERPL: 12 (ref 9–23)
CALCIUM SERPL-MCNC: 9.9 MG/DL (ref 8.7–10.2)
CHLORIDE SERPL-SCNC: 99 MMOL/L (ref 96–106)
CO2 SERPL-SCNC: 26 MMOL/L (ref 20–29)
CREAT SERPL-MCNC: 0.85 MG/DL (ref 0.57–1)
GLUCOSE SERPL-MCNC: 91 MG/DL (ref 65–99)
POTASSIUM SERPL-SCNC: 4.8 MMOL/L (ref 3.5–5.2)
SODIUM SERPL-SCNC: 137 MMOL/L (ref 134–144)

## 2021-08-27 ENCOUNTER — APPOINTMENT (OUTPATIENT)
Dept: GENERAL RADIOLOGY | Age: 46
End: 2021-08-27
Attending: NURSE PRACTITIONER
Payer: MEDICAID

## 2021-08-27 ENCOUNTER — HOSPITAL ENCOUNTER (EMERGENCY)
Age: 46
Discharge: HOME | End: 2021-08-27
Attending: STUDENT IN AN ORGANIZED HEALTH CARE EDUCATION/TRAINING PROGRAM
Payer: MEDICAID

## 2021-08-27 VITALS
DIASTOLIC BLOOD PRESSURE: 90 MMHG | SYSTOLIC BLOOD PRESSURE: 134 MMHG | OXYGEN SATURATION: 97 % | WEIGHT: 130.73 LBS | HEIGHT: 60 IN | BODY MASS INDEX: 25.67 KG/M2 | HEART RATE: 70 BPM | TEMPERATURE: 98.4 F | RESPIRATION RATE: 18 BRPM

## 2021-08-27 DIAGNOSIS — F41.1 ANXIETY STATE: Primary | ICD-10-CM

## 2021-08-27 DIAGNOSIS — R07.89 CHEST TIGHTNESS: ICD-10-CM

## 2021-08-27 LAB
ALBUMIN SERPL-MCNC: 4.4 G/DL (ref 3.5–5)
ALBUMIN/GLOB SERPL: 1.2 {RATIO} (ref 1.1–2.2)
ALP SERPL-CCNC: 99 U/L (ref 45–117)
ALT SERPL-CCNC: 26 U/L (ref 12–78)
ANION GAP SERPL CALC-SCNC: 7 MMOL/L (ref 5–15)
AST SERPL-CCNC: 13 U/L (ref 15–37)
BASOPHILS # BLD: 0 K/UL (ref 0–0.1)
BASOPHILS NFR BLD: 1 % (ref 0–1)
BILIRUB SERPL-MCNC: 0.4 MG/DL (ref 0.2–1)
BUN SERPL-MCNC: 11 MG/DL (ref 6–20)
BUN/CREAT SERPL: 14 (ref 12–20)
CALCIUM SERPL-MCNC: 9.2 MG/DL (ref 8.5–10.1)
CHLORIDE SERPL-SCNC: 108 MMOL/L (ref 97–108)
CO2 SERPL-SCNC: 23 MMOL/L (ref 21–32)
COMMENT, HOLDF: NORMAL
CREAT SERPL-MCNC: 0.79 MG/DL (ref 0.55–1.02)
D DIMER PPP FEU-MCNC: <0.19 MG/L FEU (ref 0–0.65)
DIFFERENTIAL METHOD BLD: NORMAL
EOSINOPHIL # BLD: 0.3 K/UL (ref 0–0.4)
EOSINOPHIL NFR BLD: 6 % (ref 0–7)
ERYTHROCYTE [DISTWIDTH] IN BLOOD BY AUTOMATED COUNT: 12.7 % (ref 11.5–14.5)
GLOBULIN SER CALC-MCNC: 3.6 G/DL (ref 2–4)
GLUCOSE SERPL-MCNC: 95 MG/DL (ref 65–100)
HCT VFR BLD AUTO: 40.2 % (ref 35–47)
HGB BLD-MCNC: 13.7 G/DL (ref 11.5–16)
IMM GRANULOCYTES # BLD AUTO: 0 K/UL (ref 0–0.04)
IMM GRANULOCYTES NFR BLD AUTO: 0 % (ref 0–0.5)
LYMPHOCYTES # BLD: 1.5 K/UL (ref 0.8–3.5)
LYMPHOCYTES NFR BLD: 27 % (ref 12–49)
MCH RBC QN AUTO: 31.8 PG (ref 26–34)
MCHC RBC AUTO-ENTMCNC: 34.1 G/DL (ref 30–36.5)
MCV RBC AUTO: 93.3 FL (ref 80–99)
MONOCYTES # BLD: 0.6 K/UL (ref 0–1)
MONOCYTES NFR BLD: 11 % (ref 5–13)
NEUTS SEG # BLD: 3 K/UL (ref 1.8–8)
NEUTS SEG NFR BLD: 55 % (ref 32–75)
NRBC # BLD: 0 K/UL (ref 0–0.01)
NRBC BLD-RTO: 0 PER 100 WBC
PLATELET # BLD AUTO: 294 K/UL (ref 150–400)
PMV BLD AUTO: 8.9 FL (ref 8.9–12.9)
POTASSIUM SERPL-SCNC: 3.5 MMOL/L (ref 3.5–5.1)
PROT SERPL-MCNC: 8 G/DL (ref 6.4–8.2)
RBC # BLD AUTO: 4.31 M/UL (ref 3.8–5.2)
SAMPLES BEING HELD,HOLD: NORMAL
SODIUM SERPL-SCNC: 138 MMOL/L (ref 136–145)
TROPONIN I SERPL-MCNC: <0.05 NG/ML
WBC # BLD AUTO: 5.5 K/UL (ref 3.6–11)

## 2021-08-27 PROCEDURE — 99283 EMERGENCY DEPT VISIT LOW MDM: CPT

## 2021-08-27 PROCEDURE — 71046 X-RAY EXAM CHEST 2 VIEWS: CPT

## 2021-08-27 PROCEDURE — 93005 ELECTROCARDIOGRAM TRACING: CPT

## 2021-08-27 PROCEDURE — 84484 ASSAY OF TROPONIN QUANT: CPT

## 2021-08-27 PROCEDURE — 85025 COMPLETE CBC W/AUTO DIFF WBC: CPT

## 2021-08-27 PROCEDURE — 80053 COMPREHEN METABOLIC PANEL: CPT

## 2021-08-27 PROCEDURE — 85379 FIBRIN DEGRADATION QUANT: CPT

## 2021-08-27 PROCEDURE — 74011250637 HC RX REV CODE- 250/637: Performed by: NURSE PRACTITIONER

## 2021-08-27 RX ORDER — LORAZEPAM 0.5 MG/1
1 TABLET ORAL
Status: COMPLETED | OUTPATIENT
Start: 2021-08-27 | End: 2021-08-27

## 2021-08-27 RX ORDER — HYDROXYZINE 50 MG/1
50 TABLET, FILM COATED ORAL
Qty: 25 TABLET | Refills: 0 | Status: SHIPPED | OUTPATIENT
Start: 2021-08-27 | End: 2021-09-06

## 2021-08-27 RX ADMIN — LORAZEPAM 1 MG: 0.5 TABLET ORAL at 22:31

## 2021-08-28 LAB
ATRIAL RATE: 98 BPM
CALCULATED P AXIS, ECG09: 57 DEGREES
CALCULATED R AXIS, ECG10: 9 DEGREES
CALCULATED T AXIS, ECG11: 44 DEGREES
DIAGNOSIS, 93000: NORMAL
P-R INTERVAL, ECG05: 130 MS
Q-T INTERVAL, ECG07: 352 MS
QRS DURATION, ECG06: 76 MS
QTC CALCULATION (BEZET), ECG08: 449 MS
VENTRICULAR RATE, ECG03: 98 BPM

## 2021-08-28 NOTE — ED TRIAGE NOTES
Patient came into triage ambulatory. Pnt states she has high blood pressure. Pnt reported dry mouth, tightness in chest, arms, and legs bilaterally. Has been occurring the last 2 days.

## 2021-09-28 ENCOUNTER — OFFICE VISIT (OUTPATIENT)
Dept: PRIMARY CARE CLINIC | Age: 46
End: 2021-09-28
Payer: MEDICAID

## 2021-09-28 VITALS
HEIGHT: 60 IN | TEMPERATURE: 97.9 F | SYSTOLIC BLOOD PRESSURE: 140 MMHG | DIASTOLIC BLOOD PRESSURE: 100 MMHG | HEART RATE: 93 BPM | RESPIRATION RATE: 16 BRPM | WEIGHT: 135.4 LBS | BODY MASS INDEX: 26.58 KG/M2 | OXYGEN SATURATION: 100 %

## 2021-09-28 DIAGNOSIS — F41.9 ANXIETY: ICD-10-CM

## 2021-09-28 DIAGNOSIS — Z09 HOSPITAL DISCHARGE FOLLOW-UP: ICD-10-CM

## 2021-09-28 DIAGNOSIS — I10 ESSENTIAL HYPERTENSION: Primary | ICD-10-CM

## 2021-09-28 PROCEDURE — 99214 OFFICE O/P EST MOD 30 MIN: CPT | Performed by: FAMILY MEDICINE

## 2021-09-28 RX ORDER — LOSARTAN POTASSIUM 50 MG/1
50 TABLET ORAL DAILY
Qty: 90 TABLET | Refills: 0 | Status: SHIPPED | OUTPATIENT
Start: 2021-09-28 | End: 2021-11-29 | Stop reason: SDUPTHER

## 2021-09-28 RX ORDER — HYDROCHLOROTHIAZIDE 25 MG/1
25 TABLET ORAL DAILY
Qty: 90 TABLET | Refills: 0 | Status: SHIPPED | OUTPATIENT
Start: 2021-09-28 | End: 2021-11-29 | Stop reason: SDUPTHER

## 2021-09-28 RX ORDER — AMLODIPINE BESYLATE 10 MG/1
10 TABLET ORAL DAILY
Qty: 90 TABLET | Refills: 1 | Status: SHIPPED | OUTPATIENT
Start: 2021-09-28 | End: 2021-11-29 | Stop reason: SDUPTHER

## 2021-09-28 NOTE — PROGRESS NOTES
Subjective:     Chief Complaint   Patient presents with    Follow-up    Hypertension        She  is a 48y.o. year old female for follow up on anxiety and blood pressure as well as with concerns. She is here for follow up from her recent [facility] visit on 8/27/2021 with a c/o chest pain. ER records reviewed. Cardiac work up was normal. She was told that her symptoms were likely related to anxiety.      She is currently on Amlodipine 10 mg and HCTZ 25 mg and Losartan 25 mg. Losartan was added 2 months ago. BP still has been high. Denies any chest pain, soa. Does not check BP at home.      Anxiety: Currently on Paxil 20 mg. Reports that her symptoms gets worse time to time. Colonoscopy was done on 9/3/2021. Pertinent items are noted in HPI.   Objective:     Vitals:    09/28/21 0854 09/28/21 0902   BP: (!) 158/126 (!) 160/119   Pulse: 93    Resp: 16    Temp: 97.9 °F (36.6 °C)    TempSrc: Oral    SpO2: 100%    Weight: 135 lb 6.4 oz (61.4 kg)    Height: 5' (1.524 m)        Physical Examination: General appearance - alert, well appearing, and in no distress, oriented to person, place, and time and overweight  Mental status - alert, oriented to person, place, and time, normal mood, behavior, speech, dress, motor activity, and thought processes  Chest - clear to auscultation, no wheezes, rales or rhonchi, symmetric air entry  Heart - normal rate, regular rhythm, normal S1, S2, no murmurs, rubs, clicks or gallops    No Known Allergies   Social History     Socioeconomic History    Marital status:      Spouse name: Not on file    Number of children: Not on file    Years of education: Not on file    Highest education level: Not on file   Tobacco Use    Smoking status: Light Tobacco Smoker     Packs/day: 0.50    Smokeless tobacco: Never Used    Tobacco comment: 5 cigs a day   Vaping Use    Vaping Use: Never used   Substance and Sexual Activity    Alcohol use: Not Currently     Comment: socially    Drug use: No    Sexual activity: Yes     Partners: Male     Birth control/protection: None     Social Determinants of Health     Financial Resource Strain:     Difficulty of Paying Living Expenses:    Food Insecurity:     Worried About Running Out of Food in the Last Year:     [address] in the Last Year:    Transportation Needs:     Lack of Transportation (Medical):  Lack of Transportation (Non-Medical):    Physical Activity:     Days of Exercise per Week:     Minutes of Exercise per Session:    Stress:     Feeling of Stress :    Social Connections:     Frequency of Communication with Friends and Family:     Frequency of Social Gatherings with Friends and Family:     Attends Mandaen Services:     Active Member of Clubs or Organizations:     Attends Club or Organization Meetings:     Marital Status:       Family History   Problem Relation Age of Onset    Hypertension Mother     Stroke Father     Diabetes Father     Migraines Maternal Grandmother     Headache Maternal Grandmother       Past Surgical History:   Procedure Laterality Date    HX ORTHOPAEDIC  2009    bilateral CTS procedure    HX OTHER SURGICAL      tubes in ears as child      Past Medical History:   Diagnosis Date    Anxiety disorder     Arthritis     Carpal tunnel syndrome on both sides 2009    Headache     Hypertension     Migraines       Current Outpatient Medications   Medication Sig Dispense Refill    ascorbic acid (EVE-C PO) Take  by mouth.  PARoxetine (PAXIL) 20 mg tablet Take 1 Tablet by mouth daily. 90 Tablet 1    hydroCHLOROthiazide (HYDRODIURIL) 25 mg tablet Take 1 Tablet by mouth daily. 90 Tablet 0    losartan (COZAAR) 25 mg tablet Take 1 Tablet by mouth daily. 90 Tablet 0    amLODIPine (NORVASC) 10 mg tablet Take 1 Tablet by mouth daily. 90 Tablet 1    aspirin delayed-release 81 mg tablet Take  by mouth daily.  omeprazole magnesium (PRILOSEC PO) Take  by mouth.       cholecalciferol, vitamin D3, (VITAMIN D3 PO) Take  by mouth.  albuterol (PROVENTIL HFA, VENTOLIN HFA, PROAIR HFA) 90 mcg/actuation inhaler Take 1 Puff by inhalation every six (6) hours as needed for Wheezing or Shortness of Breath. 1 Inhaler 3    multivitamin (ONE A DAY) tablet Take 1 Tab by mouth daily.  varenicline (CHANTIX STARTER RICKY) 0.5 mg (11)- 1 mg (42) DsPk Follow pack instruction. (Patient not taking: Reported on 9/28/2021) 1 Dose Pack 0    fluticasone propion-salmeteroL (ADVAIR/WIXELA) 250-50 mcg/dose diskus inhaler Take 1 Puff by inhalation every twelve (12) hours. (Patient not taking: Reported on 7/28/2021) 1 Inhaler 1    montelukast (SINGULAIR) 10 mg tablet Take 1 Tab by mouth daily. (Patient not taking: Reported on 7/28/2021) 30 Tab 3    aspirin (ASPIRIN) 325 mg tablet Take 325 mg by mouth daily. (Patient not taking: Reported on 9/28/2021)          Assessment/ Plan:   Diagnoses and all orders for this visit:    1. Essential hypertension  -   Increase   losartan (COZAAR) 50 mg tablet; Take 1 Tablet by mouth daily. -     hydroCHLOROthiazide (HYDRODIURIL) 25 mg tablet; Take 1 Tablet by mouth daily. -     amLODIPine (NORVASC) 10 mg tablet; Take 1 Tablet by mouth daily. 2. Anxiety      Symptoms are not very well controlled. Gets worse time to time due to social stressors. Discussed about seeing a therapist. Provided contact info to call to make appointment. 3. Hospital discharge follow-up      ER records reviewed . Symptoms resolved. Medication risks/benefits/costs/interactions/alternatives discussed with patient. Advised patient to call back or return to office if symptoms worsen/change/persist. If patient cannot reach us or should anything more severe/urgent arise he/she should proceed directly to the nearest emergency department. Discussed expected course/resolution/complications of diagnosis in detail with patient.   Patient given a written after visit summary which includes her diagnoses, current medications and vitals. Patient expressed understanding with the diagnosis and plan. Follow-up and Dispositions    · Return in about 2 months (around 11/28/2021), or if symptoms worsen or fail to improve, for Bring BP log book. .         '

## 2021-09-28 NOTE — PROGRESS NOTES
Identified pt with two pt identifiers(name and ). Reviewed record in preparation for visit and have obtained necessary documentation. Chief Complaint   Patient presents with    Follow-up    Hypertension        Health Maintenance Due   Topic    Hepatitis C Screening     COVID-19 Vaccine (1)    DTaP/Tdap/Td series (1 - Tdap)    Colorectal Cancer Screening Combo     Flu Vaccine (1)        Visit Vitals  BP (!) 160/119 (BP 1 Location: Right arm, BP Patient Position: Sitting, BP Cuff Size: Adult)   Pulse 93   Temp 97.9 °F (36.6 °C) (Oral)   Resp 16   Ht 5' (1.524 m)   Wt 135 lb 6.4 oz (61.4 kg)   LMP 2021   SpO2 100%   BMI 26.44 kg/m²     Pain Scale: /10    Coordination of Care Questionnaire:  :   1. Have you been to the ER, urgent care clinic since your last visit? Hospitalized since your last visit? Yes 2021 [facility] ER for anxiety    2. Have you seen or consulted any other health care providers outside of the [address] [place] [address] since your last visit? Include any pap smears or colon screening.  No

## 2021-11-12 ENCOUNTER — NURSE TRIAGE (OUTPATIENT)
Dept: OTHER | Facility: CLINIC | Age: 46
End: 2021-11-12

## 2021-11-12 NOTE — TELEPHONE ENCOUNTER
Caller disconnected with [facility] prior to being transferred to Nurse Triage    Triage RN left voicemail asking for return call. Reason for Disposition   Message left on unidentified voice mail. Phone number verified.     Protocols used: NO CONTACT OR DUPLICATE CONTACT CALL-ADULT-[place]

## 2021-11-29 ENCOUNTER — OFFICE VISIT (OUTPATIENT)
Dept: PRIMARY CARE CLINIC | Age: 46
End: 2021-11-29
Payer: MEDICAID

## 2021-11-29 VITALS
RESPIRATION RATE: 16 BRPM | TEMPERATURE: 97.6 F | HEIGHT: 60 IN | SYSTOLIC BLOOD PRESSURE: 123 MMHG | WEIGHT: 131.8 LBS | BODY MASS INDEX: 25.87 KG/M2 | OXYGEN SATURATION: 99 % | DIASTOLIC BLOOD PRESSURE: 86 MMHG | HEART RATE: 84 BPM

## 2021-11-29 DIAGNOSIS — I10 ESSENTIAL HYPERTENSION: ICD-10-CM

## 2021-11-29 DIAGNOSIS — M54.2 NECK PAIN: Primary | ICD-10-CM

## 2021-11-29 PROCEDURE — 99214 OFFICE O/P EST MOD 30 MIN: CPT | Performed by: FAMILY MEDICINE

## 2021-11-29 RX ORDER — HYDROCHLOROTHIAZIDE 25 MG/1
25 TABLET ORAL DAILY
Qty: 90 TABLET | Refills: 0 | Status: SHIPPED | OUTPATIENT
Start: 2021-11-29 | End: 2022-03-01 | Stop reason: SDUPTHER

## 2021-11-29 RX ORDER — METHOCARBAMOL 750 MG/1
750 TABLET, FILM COATED ORAL 3 TIMES DAILY
Qty: 30 TABLET | Refills: 0 | Status: SHIPPED | OUTPATIENT
Start: 2021-11-29 | End: 2022-02-28 | Stop reason: ALTCHOICE

## 2021-11-29 RX ORDER — DICLOFENAC SODIUM 75 MG/1
75 TABLET, DELAYED RELEASE ORAL 2 TIMES DAILY
Qty: 30 TABLET | Refills: 0 | Status: SHIPPED | OUTPATIENT
Start: 2021-11-29 | End: 2022-07-28

## 2021-11-29 RX ORDER — AMLODIPINE BESYLATE 10 MG/1
10 TABLET ORAL DAILY
Qty: 90 TABLET | Refills: 1 | Status: SHIPPED | OUTPATIENT
Start: 2021-11-29 | End: 2022-03-01 | Stop reason: SDUPTHER

## 2021-11-29 RX ORDER — LOSARTAN POTASSIUM 50 MG/1
50 TABLET ORAL DAILY
Qty: 90 TABLET | Refills: 0 | Status: SHIPPED | OUTPATIENT
Start: 2021-11-29 | End: 2022-03-01 | Stop reason: SDUPTHER

## 2021-11-29 NOTE — PROGRESS NOTES
Identified pt with two pt identifiers(name and ). Chief Complaint   Patient presents with    Hypertension    Headache     has been going on for 3 weeks now         3 most recent PHQ Screens 2021   Little interest or pleasure in doing things Not at all   Feeling down, depressed, irritable, or hopeless Not at all   Total Score PHQ 2 0   Trouble falling or staying asleep, or sleeping too much -   Feeling tired or having little energy -   Poor appetite, weight loss, or overeating -   Feeling bad about yourself - or that you are a failure or have let yourself or your family down -   Trouble concentrating on things such as school, work, reading, or watching TV -   Moving or speaking so slowly that other people could have noticed; or the opposite being so fidgety that others notice -   Thoughts of being better off dead, or hurting yourself in some way -   PHQ 9 Score -   How difficult have these problems made it for you to do your work, take care of your home and get along with others -        Vitals:    21 0826   BP: 123/86   Pulse: 84   Resp: 16   Temp: 97.6 °F (36.4 °C)   TempSrc: Temporal   SpO2: 99%   Weight: 131 lb 12.8 oz (59.8 kg)   Height: 5' (1.524 m)   PainSc:  10 - Worst pain ever   PainLoc: Head       Health Maintenance Due   Topic    Hepatitis C Screening     COVID-19 Vaccine (1)    DTaP/Tdap/Td series (1 - Tdap)    Colorectal Cancer Screening Combo     Flu Vaccine (1)       1. Have you been to the ER, urgent care clinic since your last visit? Hospitalized since your last visit? YES - ED [facility] for headaches 2021    2. Have you seen or consulted any other health care providers outside of the [address] [place] [address] since your last visit? Include any pap smears or colon screening.  No

## 2021-11-29 NOTE — PROGRESS NOTES
Subjective:     Chief Complaint   Patient presents with    Hypertension    Headache     has been going on for 3 weeks now         She  is a 48y.o. year old female  for follow up on  blood pressure as well as with concerns. She reports that she went to [facility] MEDICAL [place] with a c/o pain in her left side of the neck on 11/12/2021. No imaging was done. She was sent home with Toradol and Fioricet. She reports continue to have pain in her left side of the neck. Feels very tight and TTP. Any ROM hurts that area. Pain does not radiate and there is no numbness or tingling. Initially she felt nausea but it resolved. Denies any vision change, head injury. Reports that she had similar pain years ago and it resolved by itself. She quit smoking on 10/30/2021. She was told in the ER that the pain that she was having related with nicotine withdrawal.         She is currently on Amlodipine 10 mg and HCTZ 25 mg and Losartan 50 mg. Losartan was increased in last visit.  BP has been running in good range. Denies any chest pain, soa. Does not check BP at home.        Colonoscopy was done on 9/3/2021.        Pertinent items are noted in HPI. Objective:     Vitals:    11/29/21 0826   BP: 123/86   Pulse: 84   Resp: 16   Temp: 97.6 °F (36.4 °C)   TempSrc: Temporal   SpO2: 99%   Weight: 131 lb 12.8 oz (59.8 kg)   Height: 5' (1.524 m)       Physical Examination: General appearance - alert, well appearing, and in no distress, oriented to person, place, and time and normal appearing weight  Mental status - alert, oriented to person, place, and time, normal mood, behavior, speech, dress, motor activity, and thought processes  Eyes - pupils equal and reactive, extraocular eye movements intact  Ears - bilateral TM's and external ear canals normal  Neck - supple, no significant adenopathy. No swelling, rash noted. TTP over base of the neck. Side to side, flexion, extension was restricted.    Chest - clear to auscultation, no wheezes, rales or rhonchi, symmetric air entry  Heart - normal rate, regular rhythm, normal S1, S2, no murmurs, rubs, clicks or gallops  Neurological - alert, oriented, normal speech, no focal findings or movement disorder noted, cranial nerves II through XII intact, DTR's normal and symmetric    No Known Allergies   Social History     Socioeconomic History    Marital status:    Tobacco Use    Smoking status: Former Smoker     Packs/day: 0.50     Quit date: 10/31/2021     Years since quittin.0    Smokeless tobacco: Never Used    Tobacco comment: 5 cigs a day   Vaping Use    Vaping Use: Never used   Substance and Sexual Activity    Alcohol use: Not Currently     Comment: socially    Drug use: No    Sexual activity: Yes     Partners: Male     Birth control/protection: None      Family History   Problem Relation Age of Onset    Hypertension Mother     Stroke Father     Diabetes Father     Migraines Maternal Grandmother     Headache Maternal Grandmother       Past Surgical History:   Procedure Laterality Date    HX ORTHOPAEDIC  2009    bilateral CTS procedure    HX OTHER SURGICAL      tubes in ears as child      Past Medical History:   Diagnosis Date    Anxiety disorder     Arthritis     Carpal tunnel syndrome on both sides     Headache     Hypertension     Migraines       Current Outpatient Medications   Medication Sig Dispense Refill    ascorbic acid (EVE-C PO) Take  by mouth.  losartan (COZAAR) 50 mg tablet Take 1 Tablet by mouth daily. 90 Tablet 0    hydroCHLOROthiazide (HYDRODIURIL) 25 mg tablet Take 1 Tablet by mouth daily. 90 Tablet 0    amLODIPine (NORVASC) 10 mg tablet Take 1 Tablet by mouth daily. 90 Tablet 1    PARoxetine (PAXIL) 20 mg tablet Take 1 Tablet by mouth daily. 90 Tablet 1    aspirin delayed-release 81 mg tablet Take  by mouth daily.  omeprazole magnesium (PRILOSEC PO) Take  by mouth.       albuterol (PROVENTIL HFA, VENTOLIN HFA, PROAIR HFA) 90 mcg/actuation inhaler Take 1 Puff by inhalation every six (6) hours as needed for Wheezing or Shortness of Breath. 1 Inhaler 3    multivitamin (ONE A DAY) tablet Take 1 Tab by mouth daily.  varenicline (CHANTIX STARTER RICKY) 0.5 mg (11)- 1 mg (42) DsPk Follow pack instruction. (Patient not taking: Reported on 9/28/2021) 1 Dose Pack 0    cholecalciferol, vitamin D3, (VITAMIN D3 PO) Take  by mouth. (Patient not taking: Reported on 11/29/2021)      fluticasone propion-salmeteroL (ADVAIR/WIXELA) 250-50 mcg/dose diskus inhaler Take 1 Puff by inhalation every twelve (12) hours. (Patient not taking: Reported on 7/28/2021) 1 Inhaler 1    montelukast (SINGULAIR) 10 mg tablet Take 1 Tab by mouth daily. (Patient not taking: Reported on 7/28/2021) 30 Tab 3    aspirin (ASPIRIN) 325 mg tablet Take 325 mg by mouth daily. (Patient not taking: Reported on 9/28/2021)          Assessment/ Plan:   Diagnoses and all orders for this visit:    1. Neck pain  -    Neuro exam is normal. No imaging warranted according to her history and exam.   -  Start   methocarbamoL (ROBAXIN) 750 mg tablet; Take 1 Tablet by mouth three (3) times daily. Potential side effects discussed. -     diclofenac EC (VOLTAREN) 75 mg EC tablet; Take 1 Tablet by mouth two (2) times a day. -  Massage. Will consider Xray, PT if not better. 2. Essential hypertension  - BP well controlled with current med. hydroCHLOROthiazide (HYDRODIURIL) 25 mg tablet; Take 1 Tablet by mouth daily. -     losartan (COZAAR) 50 mg tablet; Take 1 Tablet by mouth daily. -     amLODIPine (NORVASC) 10 mg tablet; Take 1 Tablet by mouth daily. Medication risks/benefits/costs/interactions/alternatives discussed with patient. Advised patient to call back or return to office if symptoms worsen/change/persist. If patient cannot reach us or should anything more severe/urgent arise he/she should proceed directly to the nearest emergency department.   Discussed expected course/resolution/complications of diagnosis in detail with patient. Patient given a written after visit summary which includes her diagnoses, current medications and vitals. Patient expressed understanding with the diagnosis and plan. Follow-up and Dispositions    · Return in about 3 months (around 2/28/2022), or if symptoms worsen or fail to improve, for Bring BP log book. Ricardo Restrepo

## 2021-12-13 ENCOUNTER — VIRTUAL VISIT (OUTPATIENT)
Dept: PRIMARY CARE CLINIC | Age: 46
End: 2021-12-13
Payer: MEDICAID

## 2021-12-13 ENCOUNTER — TELEPHONE (OUTPATIENT)
Dept: PRIMARY CARE CLINIC | Age: 46
End: 2021-12-13

## 2021-12-13 DIAGNOSIS — J20.9 ACUTE BRONCHITIS, UNSPECIFIED ORGANISM: ICD-10-CM

## 2021-12-13 DIAGNOSIS — R05.9 COUGH: Primary | ICD-10-CM

## 2021-12-13 PROCEDURE — 99214 OFFICE O/P EST MOD 30 MIN: CPT | Performed by: FAMILY MEDICINE

## 2021-12-13 RX ORDER — METHYLPREDNISOLONE 4 MG/1
TABLET ORAL
Qty: 1 DOSE PACK | Refills: 0 | Status: SHIPPED | OUTPATIENT
Start: 2021-12-13 | End: 2022-02-28 | Stop reason: ALTCHOICE

## 2021-12-13 RX ORDER — GUAIFENESIN 600 MG/1
600 TABLET, EXTENDED RELEASE ORAL 2 TIMES DAILY
Qty: 30 TABLET | Refills: 0 | Status: SHIPPED | OUTPATIENT
Start: 2021-12-13 | End: 2022-02-28 | Stop reason: ALTCHOICE

## 2021-12-13 NOTE — TELEPHONE ENCOUNTER
----- Message from Loetta Friday sent at 12/10/2021  2:58 PM EST -----  Subject: Message to Provider    QUESTIONS  Information for Provider? Pt would like a callback from Dr Toan Smith, she is   having a severe, dry cough and not sleeping for about a week. She would   like a medication called in if possible.   ---------------------------------------------------------------------------  --------------  CALL BACK INFO  What is the best way for the office to contact you? OK to leave message on   voicemail  Preferred Call Back Phone Number? [phone]  ---------------------------------------------------------------------------  --------------  SCRIPT ANSWERS  Relationship to Patient?  Self

## 2021-12-13 NOTE — PROGRESS NOTES
Cassandra Moser is a 48 y.o. female who was seen by synchronous (real-time) audio-video technology on 12/13/2021 for Cough (wakes her up out of her sleep , coughing a lot ribs hurt )        Assessment & Plan:     Diagnoses and all orders for this visit:    1. Cough  -     methylPREDNISolone (MEDROL DOSEPACK) 4 mg tablet; Follow pack instruction  -     guaiFENesin ER (MUCINEX) 600 mg ER tablet; Take 1 Tablet by mouth two (2) times a day. Advised to use albuterol. 2. Acute bronchitis, unspecified organism  -     methylPREDNISolone (MEDROL DOSEPACK) 4 mg tablet; Follow pack instruction  -     guaiFENesin ER (MUCINEX) 600 mg ER tablet; Take 1 Tablet by mouth two (2) times a day. Discussed about warning signs of when to seek medical attention. I will order Xray if not better. Follow-up and Dispositions    · Return if symptoms worsen or fail to improve. Subjective: This is a 47 y/o F with Hx of HTN, anxiety is here with a c/o dry cough for the past one week. intially her chest was hurting due to coughing spells but she is no longer having any pain currently. Denies any wheezing. Throat feels ticklish. Has been taking OTC allergy med and started using Albutreol. States that albuterol helps. Denies any fever, chills, sick contacts. She quit smoking about 6 weeks ago. Prior to Admission medications    Medication Sig Start Date End Date Taking? Authorizing Provider   methocarbamoL (ROBAXIN) 750 mg tablet Take 1 Tablet by mouth three (3) times daily. 11/29/21  Yes Casey De Anda MD   diclofenac EC (VOLTAREN) 75 mg EC tablet Take 1 Tablet by mouth two (2) times a day. 11/29/21  Yes Casey DeA nda MD   hydroCHLOROthiazide (HYDRODIURIL) 25 mg tablet Take 1 Tablet by mouth daily. 11/29/21  Yes Casey De Anda MD   losartan (COZAAR) 50 mg tablet Take 1 Tablet by mouth daily. 11/29/21  Yes Casey De Anda MD   amLODIPine (NORVASC) 10 mg tablet Take 1 Tablet by mouth daily.  11/29/21  Yes Adilson Casey FRENCH MD   ascorbic acid (EVE-C PO) Take  by mouth. Yes Provider, Historical   PARoxetine (PAXIL) 20 mg tablet Take 1 Tablet by mouth daily. 7/28/21  Yes Casey De Anda MD   aspirin delayed-release 81 mg tablet Take  by mouth daily. Yes Provider, Historical   omeprazole magnesium (PRILOSEC PO) Take  by mouth. Yes Provider, Historical   albuterol (PROVENTIL HFA, VENTOLIN HFA, PROAIR HFA) 90 mcg/actuation inhaler Take 1 Puff by inhalation every six (6) hours as needed for Wheezing or Shortness of Breath. 7/29/20  Yes Casey De Anda MD   multivitamin (ONE A DAY) tablet Take 1 Tab by mouth daily. Yes Provider, Historical   varenicline (CHANTIX STARTER RICKY) 0.5 mg (11)- 1 mg (42) DsPk Follow pack instruction. Patient not taking: Reported on 9/28/2021 7/28/21   Leonel De Anda MD   cholecalciferol, vitamin D3, (VITAMIN D3 PO) Take  by mouth. Patient not taking: Reported on 11/29/2021    Provider, Historical   fluticasone propion-salmeteroL (ADVAIR/WIXELA) 250-50 mcg/dose diskus inhaler Take 1 Puff by inhalation every twelve (12) hours. Patient not taking: Reported on 7/28/2021 7/29/20   Leonel De Anda MD   montelukast (SINGULAIR) 10 mg tablet Take 1 Tab by mouth daily. Patient not taking: Reported on 7/28/2021 6/25/20   Antony Hess MD     Patient Active Problem List   Diagnosis Code    Unilateral occipital headache R51.9    Sleep disturbance G47.9    New daily persistent headache G44.52    Essential hypertension I10    Smokers' cough (HCC) J41.0     Current Outpatient Medications   Medication Sig Dispense Refill    methylPREDNISolone (MEDROL DOSEPACK) 4 mg tablet Follow pack instruction 1 Dose Pack 0    guaiFENesin ER (MUCINEX) 600 mg ER tablet Take 1 Tablet by mouth two (2) times a day. 30 Tablet 0    methocarbamoL (ROBAXIN) 750 mg tablet Take 1 Tablet by mouth three (3) times daily. 30 Tablet 0    diclofenac EC (VOLTAREN) 75 mg EC tablet Take 1 Tablet by mouth two (2) times a day.  30 Tablet 0  hydroCHLOROthiazide (HYDRODIURIL) 25 mg tablet Take 1 Tablet by mouth daily. 90 Tablet 0    losartan (COZAAR) 50 mg tablet Take 1 Tablet by mouth daily. 90 Tablet 0    amLODIPine (NORVASC) 10 mg tablet Take 1 Tablet by mouth daily. 90 Tablet 1    ascorbic acid (EVE-C PO) Take  by mouth.  PARoxetine (PAXIL) 20 mg tablet Take 1 Tablet by mouth daily. 90 Tablet 1    aspirin delayed-release 81 mg tablet Take  by mouth daily.  omeprazole magnesium (PRILOSEC PO) Take  by mouth.  albuterol (PROVENTIL HFA, VENTOLIN HFA, PROAIR HFA) 90 mcg/actuation inhaler Take 1 Puff by inhalation every six (6) hours as needed for Wheezing or Shortness of Breath. 1 Inhaler 3    multivitamin (ONE A DAY) tablet Take 1 Tab by mouth daily.  varenicline (CHANTIX STARTER RICKY) 0.5 mg (11)- 1 mg (42) DsPk Follow pack instruction. (Patient not taking: Reported on 2021) 1 Dose Pack 0    cholecalciferol, vitamin D3, (VITAMIN D3 PO) Take  by mouth. (Patient not taking: Reported on 2021)      fluticasone propion-salmeteroL (ADVAIR/WIXELA) 250-50 mcg/dose diskus inhaler Take 1 Puff by inhalation every twelve (12) hours. (Patient not taking: Reported on 2021) 1 Inhaler 1    montelukast (SINGULAIR) 10 mg tablet Take 1 Tab by mouth daily. (Patient not taking: Reported on 2021) 30 Tab 3     No Known Allergies  Social History     Tobacco Use    Smoking status: Former Smoker     Packs/day: 0.50     Quit date: 10/31/2021     Years since quittin.1    Smokeless tobacco: Never Used    Tobacco comment: 5 cigs a day   Substance Use Topics    Alcohol use: Not Currently     Comment: socially       ROS    Objective:   No flowsheet data found.      [INSTRUCTIONS:  \"[x]\" Indicates a positive item  \"[]\" Indicates a negative item  -- DELETE ALL ITEMS NOT EXAMINED]    Constitutional: [x] Appears well-developed and well-nourished [x] No apparent distress      [] Abnormal - few small coughing spells noted.    Mental status: [x] Alert and awake  [x] Oriented to person/place/time [x] Able to follow commands    [] Abnormal -     Eyes:   EOM    [x]  Normal    [] Abnormal -   Sclera  [x]  Normal    [] Abnormal -          Discharge [x]  None visible   [] Abnormal -     HENT: [x] Normocephalic, atraumatic  [] Abnormal -   [x] Mouth/Throat: Mucous membranes are moist    External Ears [x] Normal  [] Abnormal -    Neck: [x] No visualized mass [] Abnormal -     Pulmonary/Chest: [x] Respiratory effort normal   [x] No visualized signs of difficulty breathing or respiratory distress        [] Abnormal -      Musculoskeletal:   [] Normal gait with no signs of ataxia         [x] Normal range of motion of neck        [] Abnormal -     Neurological:        [x] No Facial Asymmetry (Cranial nerve 7 motor function) (limited exam due to video visit)          [x] No gaze palsy        [] Abnormal -          Skin:        [x] No significant exanthematous lesions or discoloration noted on facial skin         [] Abnormal -            Psychiatric:       [x] Normal Affect [] Abnormal -        [] No Hallucinations    Other pertinent observable physical exam findings:-        We discussed the expected course, resolution and complications of the diagnosis(es) in detail. Medication risks, benefits, costs, interactions, and alternatives were discussed as indicated. I advised her to contact the office if her condition worsens, changes or fails to improve as anticipated. She expressed understanding with the diagnosis(es) and plan. [facility], was evaluated through a synchronous (real-time) audio-video encounter. The patient (or guardian if applicable) is aware that this is a billable service. Verbal consent to proceed has been obtained within the past 12 months.  The visit was conducted pursuant to the emergency declaration under the [address] [facility], [address] waiver authority and the [facility] and Response Supplemental Appropriations Act. Patient identification was verified, and a caregiver was present when appropriate. The patient was located in a state where the provider was credentialed to provide care.       Richard Jesus MD

## 2021-12-13 NOTE — PROGRESS NOTES
Identified pt with two pt identifiers(name and ). Chief Complaint   Patient presents with    Cough     wakes her up out of her sleep , coughing a lot ribs hurt         3 most recent PHQ Screens 2021   Little interest or pleasure in doing things Not at all   Feeling down, depressed, irritable, or hopeless Not at all   Total Score PHQ 2 0   Trouble falling or staying asleep, or sleeping too much -   Feeling tired or having little energy -   Poor appetite, weight loss, or overeating -   Feeling bad about yourself - or that you are a failure or have let yourself or your family down -   Trouble concentrating on things such as school, work, reading, or watching TV -   Moving or speaking so slowly that other people could have noticed; or the opposite being so fidgety that others notice -   Thoughts of being better off dead, or hurting yourself in some way -   PHQ 9 Score -   How difficult have these problems made it for you to do your work, take care of your home and get along with others -        There were no vitals filed for this visit. Health Maintenance Due   Topic    Hepatitis C Screening     COVID-19 Vaccine (1)    DTaP/Tdap/Td series (1 - Tdap)    Colorectal Cancer Screening Combo     Flu Vaccine (1)       1. Have you been to the ER, urgent care clinic since your last visit? Hospitalized since your last visit? No    2. Have you seen or consulted any other health care providers outside of the [address] [place] [address] since your last visit? Include any pap smears or colon screening.  No

## 2022-01-11 ENCOUNTER — TELEPHONE (OUTPATIENT)
Dept: OBGYN CLINIC | Age: 47
End: 2022-01-11

## 2022-01-11 NOTE — TELEPHONE ENCOUNTER
Message left at 628am    48year old patient last seen in the office on 2/17/2021    Patient left a message stating that she has a bacterial infection and would like an appointment    Patient reports she has had a vaginal discharge with fishy odor going on 2 weeks    Patient was offered appointment [facility] and patient declined    Patient was advised that she can be seen at PCP , urgent care her better [facility]    Patient verbalized understanding.

## 2022-01-12 ENCOUNTER — TELEPHONE (OUTPATIENT)
Dept: PRIMARY CARE CLINIC | Age: 47
End: 2022-01-12

## 2022-01-12 NOTE — TELEPHONE ENCOUNTER
----- Message from Kaylee Zayas sent at 1/12/2022 11:30 AM EST -----  Subject: Message to Provider    QUESTIONS  Information for Provider? Pt is wanting to know if she can schedule her   OBGYN appt with Dr Niall Machuca, She usually sees Dr. Shobha Rice but was informed if   it's not an emergency they are currently not taking appts. Pt thinks she   may have a UTI or bacteria infection and that is why she needs   ---------------------------------------------------------------------------  --------------  CALL BACK INFO  What is the best way for the office to contact you? OK to leave message on   voicemail  Preferred Call Back Phone Number? [phone]  ---------------------------------------------------------------------------  --------------  SCRIPT ANSWERS  Relationship to Patient?  Self

## 2022-01-13 ENCOUNTER — OFFICE VISIT (OUTPATIENT)
Dept: PRIMARY CARE CLINIC | Age: 47
End: 2022-01-13
Payer: MEDICAID

## 2022-01-13 VITALS
HEART RATE: 83 BPM | RESPIRATION RATE: 16 BRPM | SYSTOLIC BLOOD PRESSURE: 128 MMHG | TEMPERATURE: 97.8 F | HEIGHT: 60 IN | OXYGEN SATURATION: 100 % | BODY MASS INDEX: 25.48 KG/M2 | DIASTOLIC BLOOD PRESSURE: 87 MMHG | WEIGHT: 129.8 LBS

## 2022-01-13 DIAGNOSIS — N89.8 VAGINAL DISCHARGE: Primary | ICD-10-CM

## 2022-01-13 PROCEDURE — 99213 OFFICE O/P EST LOW 20 MIN: CPT | Performed by: FAMILY MEDICINE

## 2022-01-13 RX ORDER — METRONIDAZOLE 7.5 MG/G
1 GEL VAGINAL
Qty: 25 G | Refills: 0 | Status: SHIPPED | OUTPATIENT
Start: 2022-01-13 | End: 2022-01-18

## 2022-01-13 NOTE — PROGRESS NOTES
Subjective:     Chief Complaint   Patient presents with    Vaginal Discharge        She  is a 48y.o. year old female who presents for evaluation of vaginal discharge. For the past three weeks she has been having fishy odor white vaginal discharge. Denies any pain, itching. Hx of BV in the past and worry that she may have BV. She is sexually active. Pertinent items are noted in HPI. Objective:     Vitals:    22 1140 22 1146   BP: (!) 134/91 128/87   Pulse: 83    Resp: 16    Temp: 97.8 °F (36.6 °C)    TempSrc: Temporal    SpO2: 100%    Weight: 129 lb 12.8 oz (58.9 kg)    Height: 5' (1.524 m)        Physical Examination: General appearance - alert, well appearing, and in no distress, oriented to person, place, and time and normal appearing weight  Mental status - alert, oriented to person, place, and time, normal mood, behavior, speech, dress, motor activity, and thought processes  Pelvic - VULVA: normal appearing vulva with no masses, tenderness or lesions, VAGINA: vaginal discharge - white, copious, odorless and thin, CERVIX: normal appearing cervix without discharge or lesions, UTERUS: uterus is normal size, shape, consistency and nontender, ADNEXA: normal adnexa in size, nontender and no masses, exam chaperoned by Larisa Gallagher.     No Known Allergies   Social History     Socioeconomic History    Marital status:    Tobacco Use    Smoking status: Former Smoker     Packs/day: 0.50     Quit date: 10/31/2021     Years since quittin.2    Smokeless tobacco: Never Used    Tobacco comment: 5 cigs a day   Vaping Use    Vaping Use: Never used   Substance and Sexual Activity    Alcohol use: Not Currently     Comment: socially    Drug use: No    Sexual activity: Yes     Partners: Male     Birth control/protection: None      Family History   Problem Relation Age of Onset    Hypertension Mother     Stroke Father     Diabetes Father     Migraines Maternal Grandmother     Headache Maternal Grandmother       Past Surgical History:   Procedure Laterality Date    HX ORTHOPAEDIC  2009    bilateral CTS procedure    HX OTHER SURGICAL      tubes in ears as child      Past Medical History:   Diagnosis Date    Anxiety disorder     Arthritis     Carpal tunnel syndrome on both sides 2009    Headache     Hypertension     Migraines       Current Outpatient Medications   Medication Sig Dispense Refill    guaiFENesin ER (MUCINEX) 600 mg ER tablet Take 1 Tablet by mouth two (2) times a day. 30 Tablet 0    diclofenac EC (VOLTAREN) 75 mg EC tablet Take 1 Tablet by mouth two (2) times a day. 30 Tablet 0    hydroCHLOROthiazide (HYDRODIURIL) 25 mg tablet Take 1 Tablet by mouth daily. 90 Tablet 0    losartan (COZAAR) 50 mg tablet Take 1 Tablet by mouth daily. 90 Tablet 0    amLODIPine (NORVASC) 10 mg tablet Take 1 Tablet by mouth daily. 90 Tablet 1    ascorbic acid (EVE-C PO) Take  by mouth.  PARoxetine (PAXIL) 20 mg tablet Take 1 Tablet by mouth daily. 90 Tablet 1    aspirin delayed-release 81 mg tablet Take  by mouth daily.  omeprazole magnesium (PRILOSEC PO) Take  by mouth.  albuterol (PROVENTIL HFA, VENTOLIN HFA, PROAIR HFA) 90 mcg/actuation inhaler Take 1 Puff by inhalation every six (6) hours as needed for Wheezing or Shortness of Breath. 1 Inhaler 3    multivitamin (ONE A DAY) tablet Take 1 Tab by mouth daily.  methylPREDNISolone (MEDROL DOSEPACK) 4 mg tablet Follow pack instruction (Patient not taking: Reported on 1/13/2022) 1 Dose Pack 0    methocarbamoL (ROBAXIN) 750 mg tablet Take 1 Tablet by mouth three (3) times daily. (Patient not taking: Reported on 1/13/2022) 30 Tablet 0    varenicline (CHANTIX STARTER RICKY) 0.5 mg (11)- 1 mg (42) DsPk Follow pack instruction. (Patient not taking: Reported on 9/28/2021) 1 Dose Pack 0    cholecalciferol, vitamin D3, (VITAMIN D3 PO) Take  by mouth.  (Patient not taking: Reported on 11/29/2021)      fluticasone propion-salmeteroL (ADVAIR/WIXELA) 250-50 mcg/dose diskus inhaler Take 1 Puff by inhalation every twelve (12) hours. (Patient not taking: Reported on 7/28/2021) 1 Inhaler 1    montelukast (SINGULAIR) 10 mg tablet Take 1 Tab by mouth daily. (Patient not taking: Reported on 7/28/2021) 30 Tab 3        Assessment/ Plan:   Diagnoses and all orders for this visit:    1. Vaginal discharge  -     NUSWAB VAGINITIS PLUS; Future- will notify result and further  recommendations. -     She cannot tolerate Metronidazole tab. Start metroNIDAZOLE (METROGEL) 0.75 % gel; Insert 5 g into vagina nightly for 5 days. Medication risks/benefits/costs/interactions/alternatives discussed with patient. Advised patient to call back or return to office if symptoms worsen/change/persist. If patient cannot reach us or should anything more severe/urgent arise he/she should proceed directly to the nearest emergency department. Discussed expected course/resolution/complications of diagnosis in detail with patient. Patient given a written after visit summary which includes her diagnoses, current medications and vitals. Patient expressed understanding with the diagnosis and plan. Follow-up and Dispositions    · Return if symptoms worsen or fail to improve.

## 2022-01-13 NOTE — PROGRESS NOTES
Identified pt with two pt identifiers(name and ). Chief Complaint   Patient presents with    Vaginal Discharge        3 most recent PHQ Screens 2022   Little interest or pleasure in doing things Not at all   Feeling down, depressed, irritable, or hopeless Not at all   Total Score PHQ 2 0   Trouble falling or staying asleep, or sleeping too much -   Feeling tired or having little energy -   Poor appetite, weight loss, or overeating -   Feeling bad about yourself - or that you are a failure or have let yourself or your family down -   Trouble concentrating on things such as school, work, reading, or watching TV -   Moving or speaking so slowly that other people could have noticed; or the opposite being so fidgety that others notice -   Thoughts of being better off dead, or hurting yourself in some way -   PHQ 9 Score -   How difficult have these problems made it for you to do your work, take care of your home and get along with others -        Vitals:    22 1140 22 1146   BP: (!) 134/91 128/87   Pulse: 83    Resp: 16    Temp: 97.8 °F (36.6 °C)    TempSrc: Temporal    SpO2: 100%    Weight: 129 lb 12.8 oz (58.9 kg)    Height: 5' (1.524 m)        Health Maintenance Due   Topic    Hepatitis C Screening     COVID-19 Vaccine (1)    DTaP/Tdap/Td series (1 - Tdap)    Colorectal Cancer Screening Combo     Flu Vaccine (1)       1. Have you been to the ER, urgent care clinic since your last visit? Hospitalized since your last visit? No    2. Have you seen or consulted any other health care providers outside of the [address] [place] [address] since your last visit? Include any pap smears or colon screening.  No

## 2022-01-17 LAB
A VAGINAE DNA VAG QL NAA+PROBE: ABNORMAL SCORE
BVAB2 DNA VAG QL NAA+PROBE: ABNORMAL SCORE
C ALBICANS DNA VAG QL NAA+PROBE: NEGATIVE
C GLABRATA DNA VAG QL NAA+PROBE: NEGATIVE
C TRACH RRNA SPEC QL NAA+PROBE: NEGATIVE
MEGA1 DNA VAG QL NAA+PROBE: ABNORMAL SCORE
N GONORRHOEA RRNA SPEC QL NAA+PROBE: NEGATIVE
SPECIMEN SOURCE: ABNORMAL
T VAGINALIS RRNA SPEC QL NAA+PROBE: NEGATIVE

## 2022-01-18 NOTE — TELEPHONE ENCOUNTER
Patient information was verified by patient . spoke with patient on 1/18/2022 @ 8:27 est labs results were given at this time .

## 2022-01-19 ENCOUNTER — TELEPHONE (OUTPATIENT)
Dept: PRIMARY CARE CLINIC | Age: 47
End: 2022-01-19

## 2022-01-19 NOTE — TELEPHONE ENCOUNTER
----- Message from Sherwin Richard MD sent at 1/17/2022  4:10 PM EST -----  Please call patient that swab test came back positive for BV . Take the med as prescribed.

## 2022-02-28 ENCOUNTER — OFFICE VISIT (OUTPATIENT)
Dept: PRIMARY CARE CLINIC | Age: 47
End: 2022-02-28
Payer: MEDICAID

## 2022-02-28 VITALS
OXYGEN SATURATION: 100 % | WEIGHT: 127.4 LBS | RESPIRATION RATE: 16 BRPM | TEMPERATURE: 97.6 F | HEART RATE: 89 BPM | HEIGHT: 60 IN | BODY MASS INDEX: 25.01 KG/M2 | SYSTOLIC BLOOD PRESSURE: 120 MMHG | DIASTOLIC BLOOD PRESSURE: 82 MMHG

## 2022-02-28 DIAGNOSIS — R09.81 NASAL CONGESTION: ICD-10-CM

## 2022-02-28 DIAGNOSIS — R09.82 POST-NASAL DRAINAGE: ICD-10-CM

## 2022-02-28 DIAGNOSIS — F41.9 ANXIETY: ICD-10-CM

## 2022-02-28 DIAGNOSIS — I10 ESSENTIAL HYPERTENSION: Primary | ICD-10-CM

## 2022-02-28 PROCEDURE — 99213 OFFICE O/P EST LOW 20 MIN: CPT | Performed by: FAMILY MEDICINE

## 2022-02-28 RX ORDER — FLUTICASONE PROPIONATE 50 MCG
2 SPRAY, SUSPENSION (ML) NASAL DAILY
Qty: 1 EACH | Refills: 3 | Status: SHIPPED | OUTPATIENT
Start: 2022-02-28 | End: 2022-10-21 | Stop reason: SDUPTHER

## 2022-02-28 NOTE — PROGRESS NOTES
Subjective:     Chief Complaint   Patient presents with    Hypertension    Headache     FOLLOW UP         She  is a 48y.o. year old female  for follow up on anxiety and blood pressure.     She is currently on Amlodipine 10 mg and HCTZ 25 mg and Losartan 50 mg. BP well controlled. Denies any chest pain, soa.      Anxiety: Currently on Paxil 20 mg. Reports that her symptoms has been better since Paxil was increased. Would like to get Flonase refill for nasal congestion and post nasal drip.     Colonoscopy was done on 9/3/2021.        Pertinent items are noted in HPI.   Objective:     Vitals:    22 0905   BP: 120/82   Pulse: 89   Resp: 16   Temp: 97.6 °F (36.4 °C)   TempSrc: Temporal   SpO2: 100%   Weight: 127 lb 6.4 oz (57.8 kg)   Height: 5' (1.524 m)       Physical Examination: General appearance - alert, well appearing, and in no distress, oriented to person, place, and time and normal appearing weight  Mental status - alert, oriented to person, place, and time, normal mood, behavior, speech, dress, motor activity, and thought processes  Ears - bilateral TM's and external ear canals normal  Mouth - mucous membranes moist, pharynx normal without lesions  Chest - clear to auscultation, no wheezes, rales or rhonchi, symmetric air entry  Heart - normal rate, regular rhythm, normal S1, S2, no murmurs, rubs, clicks or gallops    No Known Allergies   Social History     Socioeconomic History    Marital status:    Tobacco Use    Smoking status: Former Smoker     Packs/day: 0.50     Quit date: 10/31/2021     Years since quittin.3    Smokeless tobacco: Never Used    Tobacco comment: 5 cigs a day   Vaping Use    Vaping Use: Never used   Substance and Sexual Activity    Alcohol use: Not Currently     Comment: socially    Drug use: No    Sexual activity: Yes     Partners: Male     Birth control/protection: None      Family History   Problem Relation Age of Onset    Hypertension Mother     Stroke Father     Diabetes Father     Migraines Maternal Grandmother     Headache Maternal Grandmother       Past Surgical History:   Procedure Laterality Date    HX ORTHOPAEDIC  2009    bilateral CTS procedure    HX OTHER SURGICAL      tubes in ears as child      Past Medical History:   Diagnosis Date    Anxiety disorder     Arthritis     Carpal tunnel syndrome on both sides 2009    Headache     Hypertension     Migraines       Current Outpatient Medications   Medication Sig Dispense Refill    diclofenac EC (VOLTAREN) 75 mg EC tablet Take 1 Tablet by mouth two (2) times a day. 30 Tablet 0    hydroCHLOROthiazide (HYDRODIURIL) 25 mg tablet Take 1 Tablet by mouth daily. 90 Tablet 0    losartan (COZAAR) 50 mg tablet Take 1 Tablet by mouth daily. 90 Tablet 0    amLODIPine (NORVASC) 10 mg tablet Take 1 Tablet by mouth daily. 90 Tablet 1    ascorbic acid (EVE-C PO) Take  by mouth.  PARoxetine (PAXIL) 20 mg tablet Take 1 Tablet by mouth daily. 90 Tablet 1    aspirin delayed-release 81 mg tablet Take  by mouth daily.  albuterol (PROVENTIL HFA, VENTOLIN HFA, PROAIR HFA) 90 mcg/actuation inhaler Take 1 Puff by inhalation every six (6) hours as needed for Wheezing or Shortness of Breath. 1 Inhaler 3    multivitamin (ONE A DAY) tablet Take 1 Tab by mouth daily.  methylPREDNISolone (MEDROL DOSEPACK) 4 mg tablet Follow pack instruction (Patient not taking: Reported on 1/13/2022) 1 Dose Pack 0    guaiFENesin ER (MUCINEX) 600 mg ER tablet Take 1 Tablet by mouth two (2) times a day. (Patient not taking: Reported on 2/28/2022) 30 Tablet 0    methocarbamoL (ROBAXIN) 750 mg tablet Take 1 Tablet by mouth three (3) times daily. (Patient not taking: Reported on 1/13/2022) 30 Tablet 0    varenicline (CHANTIX STARTER RICKY) 0.5 mg (11)- 1 mg (42) DsPk Follow pack instruction. (Patient not taking: Reported on 9/28/2021) 1 Dose Pack 0    omeprazole magnesium (PRILOSEC PO) Take  by mouth.  (Patient not taking: Reported on 2/28/2022)      cholecalciferol, vitamin D3, (VITAMIN D3 PO) Take  by mouth. (Patient not taking: Reported on 11/29/2021)      fluticasone propion-salmeteroL (ADVAIR/WIXELA) 250-50 mcg/dose diskus inhaler Take 1 Puff by inhalation every twelve (12) hours. (Patient not taking: Reported on 7/28/2021) 1 Inhaler 1    montelukast (SINGULAIR) 10 mg tablet Take 1 Tab by mouth daily. (Patient not taking: Reported on 7/28/2021) 30 Tab 3        Assessment/ Plan:   Diagnoses and all orders for this visit:    1. Essential hypertension  -     LIPID PANEL; Future  -     METABOLIC PANEL, COMPREHENSIVE; Future  -     losartan (COZAAR) 50 mg tablet; Take 1 Tablet by mouth daily. -     hydroCHLOROthiazide (HYDRODIURIL) 25 mg tablet; Take 1 Tablet by mouth daily. -     amLODIPine (NORVASC) 10 mg tablet; Take 1 Tablet by mouth daily. 2. Post-nasal drainage  -     fluticasone propionate (FLONASE) 50 mcg/actuation nasal spray; 2 Sprays by Both Nostrils route daily. 3. Nasal congestion  -     fluticasone propionate (FLONASE) 50 mcg/actuation nasal spray; 2 Sprays by Both Nostrils route daily. 4. Anxiety  -  Well controlled with PARoxetine (PAXIL) 20 mg tablet; Take 1 Tablet by mouth daily. Medication risks/benefits/costs/interactions/alternatives discussed with patient. Advised patient to call back or return to office if symptoms worsen/change/persist. If patient cannot reach us or should anything more severe/urgent arise he/she should proceed directly to the nearest emergency department. Discussed expected course/resolution/complications of diagnosis in detail with patient. Patient given a written after visit summary which includes her diagnoses, current medications and vitals. Patient expressed understanding with the diagnosis and plan.           Follow-up and Dispositions    · Return in about 6 months (around 8/28/2022), or if symptoms worsen or fail to improve, for complete physical  and fasting blood work., Hypertension, anxiety.

## 2022-02-28 NOTE — PROGRESS NOTES
Identified pt with two pt identifiers(name and ). Chief Complaint   Patient presents with    Hypertension    Headache     FOLLOW UP         3 most recent PHQ Screens 2022   Little interest or pleasure in doing things Not at all   Feeling down, depressed, irritable, or hopeless Not at all   Total Score PHQ 2 0   Trouble falling or staying asleep, or sleeping too much -   Feeling tired or having little energy -   Poor appetite, weight loss, or overeating -   Feeling bad about yourself - or that you are a failure or have let yourself or your family down -   Trouble concentrating on things such as school, work, reading, or watching TV -   Moving or speaking so slowly that other people could have noticed; or the opposite being so fidgety that others notice -   Thoughts of being better off dead, or hurting yourself in some way -   PHQ 9 Score -   How difficult have these problems made it for you to do your work, take care of your home and get along with others -        Vitals:    22 0905   BP: 120/82   Pulse: 89   Resp: 16   Temp: 97.6 °F (36.4 °C)   TempSrc: Temporal   SpO2: 100%   Weight: 127 lb 6.4 oz (57.8 kg)   Height: 5' (1.524 m)       Health Maintenance Due   Topic    Hepatitis C Screening     COVID-19 Vaccine (1)    DTaP/Tdap/Td series (1 - Tdap)    Colorectal Cancer Screening Combo     Flu Vaccine (1)       1. Have you been to the ER, urgent care clinic since your last visit? Hospitalized since your last visit? No    2. Have you seen or consulted any other health care providers outside of the [address] [place] [address] since your last visit? Include any pap smears or colon screening.  Ortho

## 2022-03-01 LAB
ALBUMIN SERPL-MCNC: 4.8 G/DL (ref 3.8–4.8)
ALBUMIN/GLOB SERPL: 1.8 {RATIO} (ref 1.2–2.2)
ALP SERPL-CCNC: 61 IU/L (ref 44–121)
ALT SERPL-CCNC: 14 IU/L (ref 0–32)
AST SERPL-CCNC: 14 IU/L (ref 0–40)
BILIRUB SERPL-MCNC: 0.4 MG/DL (ref 0–1.2)
BUN SERPL-MCNC: 13 MG/DL (ref 6–24)
BUN/CREAT SERPL: 19 (ref 9–23)
CALCIUM SERPL-MCNC: 9.7 MG/DL (ref 8.7–10.2)
CHLORIDE SERPL-SCNC: 101 MMOL/L (ref 96–106)
CHOLEST SERPL-MCNC: 243 MG/DL (ref 100–199)
CO2 SERPL-SCNC: 25 MMOL/L (ref 20–29)
CREAT SERPL-MCNC: 0.67 MG/DL (ref 0.57–1)
EGFR: 108 ML/MIN/1.73
GLOBULIN SER CALC-MCNC: 2.7 G/DL (ref 1.5–4.5)
GLUCOSE SERPL-MCNC: 90 MG/DL (ref 65–99)
HDLC SERPL-MCNC: 75 MG/DL
LDLC SERPL CALC-MCNC: 150 MG/DL (ref 0–99)
POTASSIUM SERPL-SCNC: 5.5 MMOL/L (ref 3.5–5.2)
PROT SERPL-MCNC: 7.5 G/DL (ref 6–8.5)
SODIUM SERPL-SCNC: 140 MMOL/L (ref 134–144)
TRIGL SERPL-MCNC: 103 MG/DL (ref 0–149)
VLDLC SERPL CALC-MCNC: 18 MG/DL (ref 5–40)

## 2022-03-01 RX ORDER — PAROXETINE HYDROCHLORIDE 20 MG/1
20 TABLET, FILM COATED ORAL DAILY
Qty: 90 TABLET | Refills: 1 | Status: SHIPPED | OUTPATIENT
Start: 2022-03-01 | End: 2022-09-09 | Stop reason: SINTOL

## 2022-03-01 RX ORDER — LOSARTAN POTASSIUM 50 MG/1
50 TABLET ORAL DAILY
Qty: 90 TABLET | Refills: 0 | Status: SHIPPED | OUTPATIENT
Start: 2022-03-01

## 2022-03-01 RX ORDER — HYDROCHLOROTHIAZIDE 25 MG/1
25 TABLET ORAL DAILY
Qty: 90 TABLET | Refills: 0 | Status: SHIPPED | OUTPATIENT
Start: 2022-03-01

## 2022-03-01 RX ORDER — AMLODIPINE BESYLATE 10 MG/1
10 TABLET ORAL DAILY
Qty: 90 TABLET | Refills: 0 | Status: SHIPPED | OUTPATIENT
Start: 2022-03-01

## 2022-03-01 NOTE — PROGRESS NOTES
Please mail letter:    Kidney and liver function is normal.    Cholesterol level went up significantly from last time. According to ASCVD risk factor you do not need to be on any medication at this time. Continue work on diet and exercise and follow up cholesterol in 6 months.

## 2022-03-18 PROBLEM — J41.0 SMOKERS' COUGH (HCC): Status: ACTIVE | Noted: 2020-09-29

## 2022-03-19 PROBLEM — I10 ESSENTIAL HYPERTENSION: Status: ACTIVE | Noted: 2019-09-24

## 2022-07-28 ENCOUNTER — OFFICE VISIT (OUTPATIENT)
Dept: PRIMARY CARE CLINIC | Age: 47
End: 2022-07-28
Payer: MEDICAID

## 2022-07-28 ENCOUNTER — HOSPITAL ENCOUNTER (OUTPATIENT)
Dept: GENERAL RADIOLOGY | Age: 47
Discharge: HOME | End: 2022-07-28
Attending: FAMILY MEDICINE
Payer: MEDICAID

## 2022-07-28 VITALS
DIASTOLIC BLOOD PRESSURE: 92 MMHG | HEART RATE: 83 BPM | HEIGHT: 60 IN | TEMPERATURE: 98 F | OXYGEN SATURATION: 100 % | RESPIRATION RATE: 18 BRPM | WEIGHT: 133.6 LBS | BODY MASS INDEX: 26.23 KG/M2 | SYSTOLIC BLOOD PRESSURE: 147 MMHG

## 2022-07-28 DIAGNOSIS — E78.2 MIXED HYPERLIPIDEMIA: ICD-10-CM

## 2022-07-28 DIAGNOSIS — Z01.818 PREOP EXAMINATION: ICD-10-CM

## 2022-07-28 DIAGNOSIS — M79.641 RIGHT HAND PAIN: ICD-10-CM

## 2022-07-28 DIAGNOSIS — I10 ESSENTIAL HYPERTENSION: Primary | ICD-10-CM

## 2022-07-28 PROCEDURE — 73130 X-RAY EXAM OF HAND: CPT

## 2022-07-28 PROCEDURE — 99214 OFFICE O/P EST MOD 30 MIN: CPT | Performed by: FAMILY MEDICINE

## 2022-07-28 NOTE — PROGRESS NOTES
1. \"Have you been to the ER, urgent care clinic since your last visit? Hospitalized since your last visit? \" No    2. \"Have you seen or consulted any other health care providers outside of the [address] [place] [address] since your last visit? \" Yes Where: Pain management, back surgeon       3. For patients aged 39-70: Has the patient had a colonoscopy / FIT/ Cologuard? Yes - no Care Gap present  Last year     If the patient is female:    4. For patients aged 41-77: Has the patient had a mammogram within the past 2 years? Yes - no Care Gap present  Last year     5. For patients aged 21-65: Has the patient had a pap smear? Yes - Care Gap present. Rooming MA/LPN to request most recent results     Chief Complaint   Patient presents with    Pre-op Exam     Back surgery, 8/24. Establish Care     Right hand is hurting, around thumb.

## 2022-07-28 NOTE — PROGRESS NOTES
Preoperative Evaluation    Date of Exam: 2022    Cassandra Moser is a 50 y.o. female (:1975) who presents for preoperative evaluation. Procedure/Surgery: lumbar laminectomy   Date of Procedure/Surgery: 22  Surgeon: Dr Erika Austin: Иван De Luna  Primary Physician: Rocio Che DO  Latex Allergy: no    Patient states she can walk 1-2 level blocks or climb a flight of stairs without significant difficulty. Denies exertional chest pain, dyspnea, lightheadedness or syncope. Denies any signs of bleeding. C/o pain in right hand, proximal to thumb. Ongoing for a couple of months. Worse with certain activities. Has not tried any therapies. No known injury. Problem List:     Patient Active Problem List    Diagnosis Date Noted    Smokers' cough ([facility] [address] 2020    Essential hypertension 2019    Unilateral occipital headache 06/15/2015    Sleep disturbance 06/15/2015    New daily persistent headache 06/15/2015     Allergies:   No Known Allergies   Medications:     Current Outpatient Medications   Medication Sig    PARoxetine (PAXIL) 20 mg tablet Take 1 Tablet by mouth daily. losartan (COZAAR) 50 mg tablet Take 1 Tablet by mouth daily. hydroCHLOROthiazide (HYDRODIURIL) 25 mg tablet Take 1 Tablet by mouth daily. amLODIPine (NORVASC) 10 mg tablet Take 1 Tablet by mouth daily. fluticasone propionate (FLONASE) 50 mcg/actuation nasal spray 2 Sprays by Both Nostrils route daily. aspirin delayed-release 81 mg tablet Take  by mouth daily. omeprazole magnesium (PRILOSEC PO) Take  by mouth. albuterol (PROVENTIL HFA, VENTOLIN HFA, PROAIR HFA) 90 mcg/actuation inhaler Take 1 Puff by inhalation every six (6) hours as needed for Wheezing or Shortness of Breath.    multivitamin (ONE A DAY) tablet Take 1 Tab by mouth daily. ascorbic acid (EVE-C PO) Take  by mouth.  (Patient not taking: Reported on 2022)    cholecalciferol, vitamin D3, (VITAMIN D3 PO) Take  by mouth. (Patient not taking: Reported on 2021)     No current facility-administered medications for this visit. Surgical History:     Past Surgical History:   Procedure Laterality Date    HX ORTHOPAEDIC  2009    bilateral CTS procedure    HX OTHER SURGICAL      tubes in ears as child     Social History:     Social History     Socioeconomic History    Marital status:    Tobacco Use    Smoking status: Former     Packs/day: 0.50     Types: Cigarettes     Quit date: 10/31/2021     Years since quittin.7    Smokeless tobacco: Never    Tobacco comments:     5 cigs a day   Vaping Use    Vaping Use: Never used   Substance and Sexual Activity    Alcohol use: Not Currently     Comment: socially    Drug use: No    Sexual activity: Yes     Partners: Male     Birth control/protection: None       Recent use of: ASA    Tetanus up to date: last tetanus booster within 10 years      Anesthesia Complications: None  History of abnormal bleeding : None  History of Blood Transfusions: no  Health Care Directive or Living Will: no    REVIEW OF SYSTEMS:  A comprehensive review of systems was negative except for that written in the HPI.     EXAM:   Visit Vitals  BP (!) 147/92 (BP 1 Location: Left lower arm, BP Patient Position: Sitting)   Pulse 83   Temp 98 °F (36.7 °C) (Temporal)   Resp 18   Ht 5' (1.524 m)   Wt 133 lb 9.6 oz (60.6 kg)   SpO2 100%   BMI 26.09 kg/m²     General appearance - alert, well appearing, and in no distress  Mental status - alert, oriented to person, place, and time  Eyes - pupils equal and reactive, extraocular eye movements intact  Ears - bilateral TM's and external ear canals normal  Nose - normal and patent, no erythema, discharge or polyps  Mouth - mucous membranes moist, pharynx normal without lesions  Neck - supple, no significant adenopathy  Chest - clear to auscultation, no wheezes, rales or rhonchi, symmetric air entry  Heart - normal rate, regular rhythm, normal S1, S2, no murmurs, rubs, clicks or gallops  Abdomen - soft, nontender, nondistended, no masses or organomegaly  Extremities - peripheral pulses normal, no pedal edema, no clubbing or cyanosis. Tenderness over palmar aspect of hand just proximal to right MCP. Overlying skin intact, without erythema or warmth. Skin - normal coloration and turgor, no rashes, no suspicious skin lesions noted      DIAGNOSTICS  3. Labs: ordered, follow up on results as indicated. IMPRESSION:   Right hand pain : Xray and follow up as indicated. Advised conservative care for now with applied ice and diclofenac prn. History of Hypertension, Hyperlipidemia, Chronic and stable. Advised to hold ASA one week prior to procedure. Advised tobacco cessation. Advised regular exercise as tolerated. Labs and follow up on results as indicated. Considered low risk for cardiopulmonary complications with Lumbar Laminectomy.      No contraindications to planned surgery      Leida Dia,    7/28/2022

## 2022-07-29 LAB
ALBUMIN SERPL-MCNC: 5.1 G/DL (ref 3.8–4.8)
ALBUMIN/GLOB SERPL: 2.1 {RATIO} (ref 1.2–2.2)
ALP SERPL-CCNC: 84 IU/L (ref 44–121)
ALT SERPL-CCNC: 13 IU/L (ref 0–32)
AST SERPL-CCNC: 14 IU/L (ref 0–40)
BILIRUB SERPL-MCNC: 0.8 MG/DL (ref 0–1.2)
BUN SERPL-MCNC: 9 MG/DL (ref 6–24)
BUN/CREAT SERPL: 13 (ref 9–23)
CALCIUM SERPL-MCNC: 10.1 MG/DL (ref 8.7–10.2)
CHLORIDE SERPL-SCNC: 103 MMOL/L (ref 96–106)
CHOLEST SERPL-MCNC: 242 MG/DL (ref 100–199)
CO2 SERPL-SCNC: 25 MMOL/L (ref 20–29)
CREAT SERPL-MCNC: 0.68 MG/DL (ref 0.57–1)
EGFR: 107 ML/MIN/1.73
GLOBULIN SER CALC-MCNC: 2.4 G/DL (ref 1.5–4.5)
GLUCOSE SERPL-MCNC: 92 MG/DL (ref 65–99)
HDLC SERPL-MCNC: 83 MG/DL
LDLC SERPL CALC-MCNC: 132 MG/DL (ref 0–99)
POTASSIUM SERPL-SCNC: 4.6 MMOL/L (ref 3.5–5.2)
PROT SERPL-MCNC: 7.5 G/DL (ref 6–8.5)
SODIUM SERPL-SCNC: 137 MMOL/L (ref 134–144)
TRIGL SERPL-MCNC: 157 MG/DL (ref 0–149)
VLDLC SERPL CALC-MCNC: 27 MG/DL (ref 5–40)

## 2022-08-03 ENCOUNTER — TRANSCRIBE ORDER (OUTPATIENT)
Dept: SCHEDULING | Age: 47
End: 2022-08-03

## 2022-08-03 DIAGNOSIS — Z12.31 SCREENING MAMMOGRAM FOR HIGH-RISK PATIENT: Primary | ICD-10-CM

## 2022-08-23 ENCOUNTER — HOME HEALTH ADMISSION (OUTPATIENT)
Dept: HOME HEALTH SERVICES | Facility: HOME HEALTH | Age: 47
End: 2022-08-23
Payer: MEDICAID

## 2022-08-29 ENCOUNTER — HOME CARE VISIT (OUTPATIENT)
Dept: SCHEDULING | Facility: HOME HEALTH | Age: 47
End: 2022-08-29
Payer: MEDICAID

## 2022-08-29 VITALS
DIASTOLIC BLOOD PRESSURE: 78 MMHG | HEART RATE: 94 BPM | SYSTOLIC BLOOD PRESSURE: 120 MMHG | OXYGEN SATURATION: 96 % | TEMPERATURE: 98.4 F | RESPIRATION RATE: 16 BRPM

## 2022-08-29 PROCEDURE — 400013 HH SOC

## 2022-08-29 PROCEDURE — G0151 HHCP-SERV OF PT,EA 15 MIN: HCPCS

## 2022-08-30 ENCOUNTER — HOME CARE VISIT (OUTPATIENT)
Dept: HOME HEALTH SERVICES | Facility: HOME HEALTH | Age: 47
End: 2022-08-30
Payer: MEDICAID

## 2022-08-31 ENCOUNTER — HOME CARE VISIT (OUTPATIENT)
Dept: SCHEDULING | Facility: HOME HEALTH | Age: 47
End: 2022-08-31
Payer: MEDICAID

## 2022-08-31 VITALS
OXYGEN SATURATION: 98 % | HEART RATE: 84 BPM | TEMPERATURE: 98.4 F | DIASTOLIC BLOOD PRESSURE: 82 MMHG | RESPIRATION RATE: 16 BRPM | SYSTOLIC BLOOD PRESSURE: 136 MMHG

## 2022-08-31 PROCEDURE — G0151 HHCP-SERV OF PT,EA 15 MIN: HCPCS

## 2022-09-05 ENCOUNTER — HOME CARE VISIT (OUTPATIENT)
Dept: SCHEDULING | Facility: HOME HEALTH | Age: 47
End: 2022-09-05
Payer: MEDICAID

## 2022-09-05 VITALS
HEART RATE: 84 BPM | SYSTOLIC BLOOD PRESSURE: 122 MMHG | TEMPERATURE: 99 F | RESPIRATION RATE: 16 BRPM | DIASTOLIC BLOOD PRESSURE: 80 MMHG | OXYGEN SATURATION: 97 %

## 2022-09-05 PROCEDURE — G0151 HHCP-SERV OF PT,EA 15 MIN: HCPCS

## 2022-09-07 ENCOUNTER — HOME CARE VISIT (OUTPATIENT)
Dept: SCHEDULING | Facility: HOME HEALTH | Age: 47
End: 2022-09-07
Payer: MEDICAID

## 2022-09-07 VITALS
DIASTOLIC BLOOD PRESSURE: 82 MMHG | RESPIRATION RATE: 16 BRPM | TEMPERATURE: 99.1 F | HEART RATE: 86 BPM | OXYGEN SATURATION: 97 % | SYSTOLIC BLOOD PRESSURE: 124 MMHG

## 2022-09-07 PROCEDURE — G0151 HHCP-SERV OF PT,EA 15 MIN: HCPCS

## 2022-09-08 ENCOUNTER — NURSE TRIAGE (OUTPATIENT)
Dept: OTHER | Facility: CLINIC | Age: 47
End: 2022-09-08

## 2022-09-08 NOTE — TELEPHONE ENCOUNTER
Received call from [address] at [facility] with Red Flag Complaint. Subjective: Caller states \"I had surgery on the 24th for a lumbar fusion in my back. Today I went for my follow up with my surgeon, I told him I was in pain, I had the ultra sound done, there is a blood clot, they are starting me on blood thinners. He wants me to follow up with my primary care doctor tomorrow. \"     Current Symptoms: Pain and blood clot to right calf area    Denies - chest pain / shortness of breath    Onset: 14 days ago;       Pain Severity: 10/10; Temperature: flexeril, oxycodone, valium     Recommended disposition: See PCP within 24 Hours    Care advice provided, patient verbalizes understanding; denies any other questions or concerns; instructed to call back for any new or worsening symptoms. Patient/Caller agrees with recommended disposition; writer provided warm transfer to Leta Montes De Oca at [facility] for appointment scheduling    Attention Provider: Thank you for allowing me to participate in the care of your patient. The patient was connected to triage in response to information provided to the [facility]. Please do not respond through this encounter as the response is not directed to a shared pool.         Reason for Disposition   Nursing judgment or information in reference    Protocols used: No Guideline Available-ADULT-AH

## 2022-09-09 ENCOUNTER — TELEPHONE (OUTPATIENT)
Dept: ONCOLOGY | Age: 47
End: 2022-09-09

## 2022-09-09 ENCOUNTER — OFFICE VISIT (OUTPATIENT)
Dept: PRIMARY CARE CLINIC | Age: 47
End: 2022-09-09
Payer: MEDICAID

## 2022-09-09 ENCOUNTER — OFFICE VISIT (OUTPATIENT)
Dept: ONCOLOGY | Age: 47
End: 2022-09-09

## 2022-09-09 VITALS
SYSTOLIC BLOOD PRESSURE: 122 MMHG | RESPIRATION RATE: 17 BRPM | BODY MASS INDEX: 28.71 KG/M2 | DIASTOLIC BLOOD PRESSURE: 83 MMHG | TEMPERATURE: 99.1 F | WEIGHT: 147 LBS | HEART RATE: 111 BPM | OXYGEN SATURATION: 97 %

## 2022-09-09 VITALS
RESPIRATION RATE: 16 BRPM | OXYGEN SATURATION: 99 % | HEART RATE: 116 BPM | SYSTOLIC BLOOD PRESSURE: 110 MMHG | BODY MASS INDEX: 28.74 KG/M2 | WEIGHT: 146.4 LBS | DIASTOLIC BLOOD PRESSURE: 75 MMHG | HEIGHT: 60 IN | TEMPERATURE: 98 F

## 2022-09-09 DIAGNOSIS — I82.4Y1 ACUTE DEEP VEIN THROMBOSIS (DVT) OF PROXIMAL VEIN OF RIGHT LOWER EXTREMITY (HCC): Primary | ICD-10-CM

## 2022-09-09 DIAGNOSIS — T81.72XD POSTOPERATIVE ACUTE DEEP VEIN THROMBOSIS (DVT) OF LOWER EXTREMITY, SUBSEQUENT ENCOUNTER (HCC): Primary | ICD-10-CM

## 2022-09-09 DIAGNOSIS — I82.409 POSTOPERATIVE ACUTE DEEP VEIN THROMBOSIS (DVT) OF LOWER EXTREMITY, SUBSEQUENT ENCOUNTER (HCC): Primary | ICD-10-CM

## 2022-09-09 PROBLEM — I82.401 ACUTE DEEP VEIN THROMBOSIS (DVT) OF RIGHT LOWER EXTREMITY (HCC): Status: ACTIVE | Noted: 2022-01-01

## 2022-09-09 PROBLEM — I82.401 ACUTE DEEP VEIN THROMBOSIS (DVT) OF RIGHT LOWER EXTREMITY (HCC): Status: ACTIVE | Noted: 2022-09-09

## 2022-09-09 PROCEDURE — 99212 OFFICE O/P EST SF 10 MIN: CPT | Performed by: FAMILY MEDICINE

## 2022-09-09 PROCEDURE — 99204 OFFICE O/P NEW MOD 45 MIN: CPT | Performed by: INTERNAL MEDICINE

## 2022-09-09 NOTE — PROGRESS NOTES
[address] [place], [place] [address]way at [address], [address] [place] [address],[address]  W: [phone]  F: [phone]    Reason for Visit:   Yolanda Kendrick is a 50 y.o. female who is seen in consultation at the request of Dr. Jeana Platt for evaluation of acute VTE. Hematology/Oncology Treatment History:   Acute RLE DVT diagnosed 22. Risk factors: a) recent hospitalization, b) surgery (lumbar fusion), c) immobility, d) smoker, e) systemic steroids. History of Present Illness:   Yolanda Kendrick is a pleasant 50 y.o. female who presents today for evaluation of right lower extremity DVT. Patient underwent lumbar fusion on 22. She was hospitalized for 5 days after surgery. While admitted, she was given a baby aspirin daily but does not recall ever receiving any injections such as lovenox or heparin. When she discharged home, she resumed aspirin 325 mg daily, which she had been taking self prescribed for years prior to surgery. 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 She was given a Medrol dose pack for LE swelling. She has had very limited mobility and has been using a walker to ambulate. She notes that five days ago, she began experiencing severe pain in her thigh and calf with associated swelling and darkening of her foot. She followed up with Dariusz De La Vega in Ortho, who ordered PVL. PVL from 22 showed acute RLE DVT (report not available to me). She was prescribed Lovenox 40 mg x 1 dose, administered at the pharmacy, and told to follow up in Hematology. She denies a personal history or family history of blood clot. She is a former smoker - quit 2022 for her surgery. She has had 5 pregnancies: 4 children (1 ), 1 miscarriage. Denies blood clotting issues during pregnancy or postpartum. She has previously been on Depo-Provera (>10 years ago).   In her 35s, she briefly took OCPs but stopped given blood clotting risk and ongoing smoking. Family History:  Dad - no history of blood clot  Mother - no history of blood clot  3 brothers - no history of blood clot    Social History:  [place] in [place] with 2 children and grandson. On leave since July 2022. Former [employer] . Social EtOH. Former smoker, quit August 2022 for surgery. 15 pack year history (0.5 ppd x 30 years, on and off). Review of systems was obtained and pertinent findings reviewed above. Past medical history, social history, family history, medications, and allergies are located in the electronic medical record. Physical Exam:   Visit Vitals  /83 (BP 1 Location: Right upper arm, BP Patient Position: Sitting)   Pulse (!) 111   Temp 99.1 °F (37.3 °C) (Oral)   Resp 17   Wt 147 lb (66.7 kg)   SpO2 97%   BMI 28.71 kg/m²     ECOG PS: 1  General: no distress, tearful, using walker to ambulate  Eyes: anicteric sclerae  HENT: oropharynx clear  Neck: supple  Lymphatic: no cervical, supraclavicular, or inguinal adenopathy  Respiratory: normal respiratory effort  CV: RRR, no murmurs  Ext: R > L LE edema, calf mildly painful to palpation, R foot appears more erythematous than left foot  GI: soft, nontender, nondistended, no masses  Skin: no rashes, no ecchymoses, no petechiae    Results:     Lab Results   Component Value Date/Time    WBC 5.5 08/27/2021 09:28 PM    HGB 13.7 08/27/2021 09:28 PM    HCT 40.2 08/27/2021 09:28 PM    PLATELET 236 81/03/6001 09:28 PM    MCV 93.3 08/27/2021 09:28 PM    ABS.  NEUTROPHILS 3.0 08/27/2021 09:28 PM     Lab Results   Component Value Date/Time    Sodium 137 07/28/2022 12:00 AM    Potassium 4.6 07/28/2022 12:00 AM    Chloride 103 07/28/2022 12:00 AM    CO2 25 07/28/2022 12:00 AM    Glucose 92 07/28/2022 12:00 AM    BUN 9 07/28/2022 12:00 AM    Creatinine 0.68 07/28/2022 12:00 AM    GFR est AA >60 08/27/2021 09:28 PM    GFR est non-AA >60 08/27/2021 09:28 PM    Calcium 10.1 07/28/2022 12:00 AM     Lab Results   Component Value Date/Time    Bilirubin, total 0.8 07/28/2022 12:00 AM    ALT (SGPT) 13 07/28/2022 12:00 AM    Alk. phosphatase 84 07/28/2022 12:00 AM    Protein, total 7.5 07/28/2022 12:00 AM    Albumin 5.1 (H) 07/28/2022 12:00 AM    Globulin 3.6 08/27/2021 09:28 PM       Records from Care Everywhere reviewed and summarized above. Test results above have been reviewed. Will obtain ultrasound report for review. Assessment:   1) Acute Provoked RLE DVT: Patient presented with 5 days of calf pain and swelling which developed after lumbar fusion surgery on 8/24/22. She was hospitalized from 8/24/22 - 8/28/22 and was placed on aspirin 81 mg for prophylaxis. Reports limited mobility post operatively due to dural nerve injury. Risk factors: a) recent surgery, b) acute hospitalization, c) immobility, d) smoking. We discussed that this represents provoked DVT with multiple major transient risk factors and therefore requires three months of adequate anticoagulation.       Plan:     Obtain ultrasound report from [facility] for review  Eliquis 10 mg BID x 7 days, then 5 mg daily thereafter for total duration of 3 months  Follow up in 3 months to discuss discontinuation of Eliquis    Signed By: Julian Yanes MD

## 2022-09-09 NOTE — PROGRESS NOTES
Troy Vargas is a 50 y.o. female  DVT  1. Have you been to the ER, urgent care clinic since your last visit? Hospitalized since your last visit? No    2. Have you seen or consulted any other health care providers outside of the [address] [place] [address] since your last visit? Include any pap smears or colon screening.  No

## 2022-09-09 NOTE — PATIENT INSTRUCTIONS
[facility] at [address]: [phone]  F: [phone]    It was a pleasure to see you in clinic today. You have an acute blood clot due to your recent immobility and surgery. We will start you on a blood thinner called Apixaban (Eliquis). Please take 10 mg (2 tablets) twice daily x 7 days, then take 5 mg (1 tablet) twice daily thereafter. Return to clinic in 3 months. We will discuss discontinuing the blood thinner at that time.     Germain Cleaning MD

## 2022-09-09 NOTE — LETTER
9/9/2022    Patient: Aimee Wells   YOB: 1975   Date of Visit: 9/9/2022     Fidelina Crystal, [facility]  Via In Basket    Dear Fidelina Crystal DO,      Thank you for referring Ms. Aimee Wells to [address] [place] [address] for evaluation. My notes for this consultation are attached. If you have questions, please do not hesitate to call me. I look forward to following your patient along with you.       Sincerely,    Pattie Chew MD

## 2022-09-09 NOTE — PROGRESS NOTES
Subjective  Donna Winslow is an 50 y.o. female who presents follow up. Lumbar surgery performed 8/24/22. Dx with RLE DVT yesterday (see scanned report). Received lovenox injection yesterday. Today c/o persistent rle pain, unchanged. Denies any sign of bleeding. Denies fever, chest pain or dyspnea. Has had postop paresthesias and weakness of rle, postop complication, stable. Followed up with ortho yesterday. Allergies - reviewed:   No Known Allergies      Medications - reviewed:   Current Outpatient Medications   Medication Sig    oxyCODONE IR (ROXICODONE) 5 mg immediate release tablet Take 5 mg by mouth every four (4) hours as needed for Pain.    diazePAM (VALIUM) 2 mg tablet Take 2 mg by mouth every twelve (12) hours as needed for Anxiety. cyclobenzaprine (FLEXERIL) 10 mg tablet Take 10 mg by mouth every eight (8) hours. as needed for muscle spasms    docusate sodium (COLACE) 100 mg capsule Take 100 mg by mouth two (2) times a day. take as needed for constipation    losartan (COZAAR) 50 mg tablet Take 1 Tablet by mouth daily. hydroCHLOROthiazide (HYDRODIURIL) 25 mg tablet Take 1 Tablet by mouth daily. amLODIPine (NORVASC) 10 mg tablet Take 1 Tablet by mouth daily. fluticasone propionate (FLONASE) 50 mcg/actuation nasal spray 2 Sprays by Both Nostrils route daily. aspirin delayed-release 81 mg tablet Take 325 mg by mouth daily. Indications: last dose 9/7    omeprazole magnesium (PRILOSEC PO) Take 20 mg by mouth daily. albuterol (PROVENTIL HFA, VENTOLIN HFA, PROAIR HFA) 90 mcg/actuation inhaler Take 1 Puff by inhalation every six (6) hours as needed for Wheezing or Shortness of Breath.    multivitamin (ONE A DAY) tablet Take 1 Tab by mouth daily. apixaban (ELIQUIS) 5 mg tablet Take 10 mg (2 tablets) twice a day x 7 days, then 5 mg (1 tablet) twice a day thereafter. Continue until follow up visit. No current facility-administered medications for this visit.          Past Medical History - reviewed:  Past Medical History:   Diagnosis Date    Anxiety disorder     Arthritis     Carpal tunnel syndrome on both sides 2009    Dural tear     Headache     Hypertension     Migraines          Past Surgical History - reviewed:   Past Surgical History:   Procedure Laterality Date    HX LUMBAR FUSION      HX ORTHOPAEDIC  2009    bilateral CTS procedure    HX OTHER SURGICAL      tubes in ears as child         Social History - reviewed:  Social History     Socioeconomic History    Marital status:      Spouse name: Not on file    Number of children: Not on file    Years of education: Not on file    Highest education level: Not on file   Occupational History    Not on file   Tobacco Use    Smoking status: Former     Packs/day: 0.50     Types: Cigarettes     Quit date: 10/31/2021     Years since quittin.8    Smokeless tobacco: Never    Tobacco comments:     5 cigs a day   Vaping Use    Vaping Use: Never used   Substance and Sexual Activity    Alcohol use: Not Currently     Comment: socially    Drug use: No    Sexual activity: Yes     Partners: Male     Birth control/protection: None   Other Topics Concern    Not on file   Social History Narrative    Not on file     Social Determinants of Health     Financial Resource Strain: Medium Risk    Difficulty of Paying Living Expenses: Somewhat hard   Food Insecurity: Food Insecurity Present    Worried About Running Out of Food in the Last Year: Sometimes true    Ran Out of Food in the Last Year: Sometimes true   Transportation Needs: Not on file   Physical Activity: Not on file   Stress: Not on file   Social Connections: Not on file   Intimate Partner Violence: Not on file   Housing Stability: Not on file         Family History - reviewed:  Family History   Problem Relation Age of Onset    Hypertension Mother     Stroke Father     Diabetes Father     Migraines Maternal Grandmother     Headache Maternal Grandmother          Immunizations - reviewed:   Immunization History   Administered Date(s) Administered    Influenza, FLUARIX, FLULAVAL, FLUZONE (age 10 mo+) AND AFLURIA, (age 1 y+), PF, 0.5mL 10/15/2018, 10/29/2019, 09/29/2020    Pneumococcal Polysaccharide (PPSV-23) 09/10/2018       ROS  CONSTITUTIONAL: Denies: fever, chills  CARDIOVASCULAR: Denies: chest pain, dyspnea on exertion, orthopnea  RESPIRATORY: Denies: cough, hemoptysis, shortness of breath, pleuritic chest pain      Physical Exam  Visit Vitals  /75 (BP 1 Location: Right arm, BP Patient Position: Sitting, BP Cuff Size: Adult)   Pulse (!) 116   Temp 98 °F (36.7 °C) (Temporal)   Resp 16   Ht 5' (1.524 m)   Wt 146 lb 6.4 oz (66.4 kg)   SpO2 99%   BMI 28.59 kg/m²       General appearance - oriented to person, place, and time and no acute distress. Chest - clear to auscultation, no wheezes, rales or rhonchi, symmetric air entry  Heart - normal rate, regular rhythm, normal S1, S2, no murmurs, rubs, clicks or gallops  Extremities - pedal edema RLE +1 edema. LLE no edema. LE peripheral pulses intact. Assessment/Plan    ICD-10-CM ICD-9-CM    1. Postoperative acute deep vein thrombosis (DVT) of lower extremity, subsequent encounter ([facility])  T81.72XD V58.89     I82.409 997.2       Clinically stable. Follow up with [facility] later today. I have discussed the diagnosis with the patient and the intended plan as seen in the above orders. Patient verbalized understanding of the plan and agrees with the plan.        Khloe Meredith, DO

## 2022-09-09 NOTE — TELEPHONE ENCOUNTER
Called insurance ([facility]) @ [phone], spoke with Rep who confirmed insurance is valid, effective 4/1/2020.   Call Reference #[id number]

## 2022-09-09 NOTE — PROGRESS NOTES
Identified pt with two pt identifiers(name and ). Chief Complaint   Patient presents with    Blood Clot      R leg - doppler done at [address] 2022 - Dr. Nikolas Motta ordered it . - had enoxaparin 40 mg injection yesterday         3 most recent PHQ Screens 2022   Little interest or pleasure in doing things Not at all   Feeling down, depressed, irritable, or hopeless Not at all   Total Score PHQ 2 0   Trouble falling or staying asleep, or sleeping too much -   Feeling tired or having little energy -   Poor appetite, weight loss, or overeating -   Feeling bad about yourself - or that you are a failure or have let yourself or your family down -   Trouble concentrating on things such as school, work, reading, or watching TV -   Moving or speaking so slowly that other people could have noticed; or the opposite being so fidgety that others notice -   Thoughts of being better off dead, or hurting yourself in some way -   PHQ 9 Score -   How difficult have these problems made it for you to do your work, take care of your home and get along with others -        Vitals:    22 1145   BP: 110/75   Pulse: (!) 116   Resp: 16   Temp: 98 °F (36.7 °C)   TempSrc: Temporal   SpO2: 99%   Weight: 146 lb 6.4 oz (66.4 kg)   Height: 5' (1.524 m)       Health Maintenance Due   Topic Date Due    COVID-19 Vaccine (1) Never done    DTaP/Tdap/Td series (1 - Tdap) Never done    Colorectal Cancer Screening Combo  Never done    Flu Vaccine (1) 2022        1. Have you been to the ER, urgent care clinic since your last visit? Hospitalized since your last visit? Yes     2. Have you seen or consulted any other health care providers outside of the [address] [place] [address] since your last visit? Include any pap smears or colon screening. No    3. For patients aged 39-70: Has the patient had a colonoscopy / FIT/ Cologuard? No      If the patient is female:    4.  For patients aged 41-77: Has the patient had a mammogram within the past 2 years? Yes - no Care Gap present      5. For patients aged 21-65: Has the patient had a pap smear?  Yes - no Care Gap present

## 2022-09-12 ENCOUNTER — HOME CARE VISIT (OUTPATIENT)
Dept: SCHEDULING | Facility: HOME HEALTH | Age: 47
End: 2022-09-12
Payer: MEDICAID

## 2022-09-12 VITALS
DIASTOLIC BLOOD PRESSURE: 78 MMHG | OXYGEN SATURATION: 94 % | TEMPERATURE: 98.4 F | SYSTOLIC BLOOD PRESSURE: 118 MMHG | HEART RATE: 93 BPM | RESPIRATION RATE: 16 BRPM

## 2022-09-12 PROCEDURE — G0151 HHCP-SERV OF PT,EA 15 MIN: HCPCS

## 2022-09-14 ENCOUNTER — HOME CARE VISIT (OUTPATIENT)
Dept: SCHEDULING | Facility: HOME HEALTH | Age: 47
End: 2022-09-14
Payer: MEDICAID

## 2022-09-14 VITALS
RESPIRATION RATE: 16 BRPM | DIASTOLIC BLOOD PRESSURE: 76 MMHG | SYSTOLIC BLOOD PRESSURE: 120 MMHG | TEMPERATURE: 98.5 F | OXYGEN SATURATION: 99 % | HEART RATE: 91 BPM

## 2022-09-14 PROCEDURE — G0151 HHCP-SERV OF PT,EA 15 MIN: HCPCS

## 2022-09-15 ENCOUNTER — DOCUMENTATION ONLY (OUTPATIENT)
Dept: ONCOLOGY | Age: 47
End: 2022-09-15

## 2022-09-15 NOTE — PROGRESS NOTES
Reviewed PVL from 9/8/22. Report shows \"acute occlusive DVT involving right posterior tibial veins and right gastroconemius veins. Remaining veins imaged of RLE and contralateral common femoral vein are patent. \"      Diagnosis: acute provoked distal DVT  It is worth noting not all distal DVTs require anticoagulation and can be monitored with serial ultrasounds, but given patient's degree of symptoms, anticoagulation seems appropriate. No changes to plan.

## 2022-09-19 ENCOUNTER — HOME CARE VISIT (OUTPATIENT)
Dept: SCHEDULING | Facility: HOME HEALTH | Age: 47
End: 2022-09-19
Payer: MEDICAID

## 2022-09-19 VITALS
DIASTOLIC BLOOD PRESSURE: 80 MMHG | TEMPERATURE: 98.3 F | RESPIRATION RATE: 16 BRPM | OXYGEN SATURATION: 93 % | SYSTOLIC BLOOD PRESSURE: 122 MMHG | HEART RATE: 84 BPM

## 2022-09-19 PROCEDURE — G0151 HHCP-SERV OF PT,EA 15 MIN: HCPCS

## 2022-09-20 ENCOUNTER — HOME CARE VISIT (OUTPATIENT)
Dept: HOME HEALTH SERVICES | Facility: HOME HEALTH | Age: 47
End: 2022-09-20
Payer: MEDICAID

## 2022-09-21 ENCOUNTER — TELEPHONE (OUTPATIENT)
Dept: ONCOLOGY | Age: 47
End: 2022-09-21

## 2022-09-21 ENCOUNTER — HOME CARE VISIT (OUTPATIENT)
Dept: SCHEDULING | Facility: HOME HEALTH | Age: 47
End: 2022-09-21
Payer: MEDICAID

## 2022-09-21 VITALS
DIASTOLIC BLOOD PRESSURE: 80 MMHG | SYSTOLIC BLOOD PRESSURE: 118 MMHG | TEMPERATURE: 98.3 F | OXYGEN SATURATION: 100 % | RESPIRATION RATE: 16 BRPM | HEART RATE: 95 BPM

## 2022-09-21 DIAGNOSIS — I82.441 ACUTE DEEP VEIN THROMBOSIS (DVT) OF TIBIAL VEIN OF RIGHT LOWER EXTREMITY (HCC): Primary | ICD-10-CM

## 2022-09-21 PROCEDURE — G0151 HHCP-SERV OF PT,EA 15 MIN: HCPCS

## 2022-09-21 NOTE — TELEPHONE ENCOUNTER
Syed Angulo called and left a message that patient is stating that her R leg is in pain- she stated a 8 out 10 on the pain level. She also stated that the patient said it was worst than 2 days ago when the [address] [place] [address] Lux Greco Nurse was last there.   Please give the Home Health Nurse a call or the patient      Syed Angulo:  [phone]      Patient:  [phone]

## 2022-09-21 NOTE — TELEPHONE ENCOUNTER
Patient left a voicemail  with a question for provider. Is she suppose to be getting any better with the blood clot. She does not see any improvement. Please give patient a call.       Message came in yesterday at 3:41 PM

## 2022-09-21 NOTE — TELEPHONE ENCOUNTER
[address] verified  Provided instructions   Take 1000mg ES Tylenol q8hrs. Verified with patient that her medications have not changed. Ambulate as tolerated. Doppler study ordered and provided # [phone] for patient to call and schedule. Thanked us for the calls.

## 2022-09-21 NOTE — TELEPHONE ENCOUNTER
[address]   Patient stated she sees no improvement in her right calf where her clot is. Patient is still taking her pain medication due to her back surgery and her pain is 8 out of 10 and intermittent throughout the day, worse in the morning . Patient stated \" I thought this would be improving by now \". Patient does not think there has been an increase in swelling or change in appearance of right leg.

## 2022-09-22 ENCOUNTER — HOSPITAL ENCOUNTER (OUTPATIENT)
Dept: VASCULAR SURGERY | Age: 47
Discharge: HOME | End: 2022-09-22
Attending: INTERNAL MEDICINE
Payer: MEDICAID

## 2022-09-22 DIAGNOSIS — I82.441 ACUTE DEEP VEIN THROMBOSIS (DVT) OF TIBIAL VEIN OF RIGHT LOWER EXTREMITY (HCC): ICD-10-CM

## 2022-09-22 PROCEDURE — 93971 EXTREMITY STUDY: CPT

## 2022-10-21 DIAGNOSIS — R09.82 POST-NASAL DRAINAGE: ICD-10-CM

## 2022-10-21 DIAGNOSIS — R09.81 NASAL CONGESTION: ICD-10-CM

## 2022-10-24 RX ORDER — FLUTICASONE PROPIONATE 50 MCG
2 SPRAY, SUSPENSION (ML) NASAL DAILY
Qty: 1 EACH | Refills: 3 | Status: SHIPPED | OUTPATIENT
Start: 2022-10-24

## 2022-11-09 ENCOUNTER — HOSPITAL ENCOUNTER (OUTPATIENT)
Dept: MAMMOGRAPHY | Age: 47
Discharge: HOME | End: 2022-11-09
Attending: FAMILY MEDICINE
Payer: MEDICAID

## 2022-11-09 DIAGNOSIS — Z12.31 SCREENING MAMMOGRAM FOR HIGH-RISK PATIENT: ICD-10-CM

## 2022-11-09 PROCEDURE — 77063 BREAST TOMOSYNTHESIS BI: CPT

## 2022-12-07 NOTE — PROGRESS NOTES
[address] [facility]   [phone]    Hematology / Oncology Follow-up    Reason for Visit:   Radha Lozada is a 50 y.o. female who is seen for follow-up of VTE. Assessment:     1) Acute Provoked Distal RLE DVT:   First episode - Presented with 5 days of calf pain and swelling which developed after lumbar fusion surgery on 8/24/22. Patient was hospitalized from 8/24/22 - 8/28/22 and was placed on aspirin 81 mg for prophylaxis. PVL from 9/8/22 showed acute occlusive DVT involving right posterior tibial veins and right gastroconemius veins. Risk factors: a) recent surgery, b) acute hospitalization, c) immobility, d) smoking. Started on Eliquis with load. Swelling resolved considerably, but still with chronic mild swelling/asymmetry. Patient has completed 3 months of therapeutic anti-coagulation for provoked DVT. Given ongoing swelling, will obtain Doppler US to rule out acute clot. If negative, then okay to discontinue Eliquis. If positive for acute thrombus, will need to continue anti-coagulation but consider alternative blood thinner. Discussed importance of good DVT prophylaxis during high risk situations e.g. hospitalization, sugery, immobility. #) History of Tobacco Abuse: Patient quit smoking 8/2022. Congratulated on cessation. Plan:     Obtain STAT Doppler ultrasound of RLE. Continue Eliquis for now. If Doppler negative, okay to discontinue as she has completed 3 months of therapeutic AC    Thank you for involving me in the care of this patient. Signed By: Jewell Victor MD    December 9, 2022        Interval History:     Patient remains on the Eliquis and is overall doing well. She notes that the massive swelling improved within 2-3 days of starting Eliquis but that she has had chronic persistent swelling since then. Denies any calf pain or tenderness. She is back to her normal activity level. She does complain of R numbness/tingling.   MRI obtained by Orthopaedist.  Plan is for nerve conduction     She has had 5 pregnancies: 4 children (1 ), 1 miscarriage. Denies blood clotting issues during pregnancy or postpartum. She has previously been on Depo-Provera (>10 years ago). In her 35s, she briefly took OCPs but stopped given blood clotting risk and ongoing smoking. Medications reviewed in the EMR. No Known Allergies     Review of Systems: A 6-point review of systems was obtained, negative except as reviewed in the HPI. Family History:  Dad - no history of blood clot  Mother - no history of blood clot  3 brothers - no history of blood clot     Social History:  Lives in [facility] with 2 children and grandson. On leave since 2022. Former [employer] . Social EtOH. Former smoker, quit 2022 for surgery. 15 pack year history (0.5 ppd x 30 years, on and off). Physical Exam:   Visit Vitals  BP (!) 136/98 (BP 1 Location: Right arm, BP Patient Position: Sitting)   Pulse (!) 103   Temp 98.2 °F (36.8 °C) (Temporal)   Ht 5' (1.524 m)   Wt 149 lb 4.8 oz (67.7 kg)   SpO2 98%   BMI 29.16 kg/m²     General: No distress  Eyes: Anicteric sclerae  HENT: Atraumatic  Neck: Supple  Respiratory: Normal respiratory effort  CV: RRR  Ext: R calf asymmetry, non-tender to palpation, no redness  GI: Soft, nontender, nondistended, no masses, no hepatomegaly, no splenomegaly  Skin: No rashes, ecchymoses, or petechiae  Psych: Alert, oriented, appropriate affect, normal judgment/insight    Results:     Lab Results   Component Value Date/Time    WBC 5.5 2021 09:28 PM    HGB 13.7 2021 09:28 PM    HCT 40.2 2021 09:28 PM    PLATELET 725  09:28 PM    MCV 93.3 2021 09:28 PM    ABS.  NEUTROPHILS 3.0 2021 09:28 PM     Lab Results   Component Value Date/Time    Sodium 137 2022 12:00 AM    Potassium 4.6 2022 12:00 AM    Chloride 103 2022 12:00 AM    CO2 25 2022 12:00 AM    Glucose 92 07/28/2022 12:00 AM    BUN 9 07/28/2022 12:00 AM    Creatinine 0.68 07/28/2022 12:00 AM    GFR est AA >60 08/27/2021 09:28 PM    GFR est non-AA >60 08/27/2021 09:28 PM    Calcium 10.1 07/28/2022 12:00 AM     Lab Results   Component Value Date/Time    Bilirubin, total 0.8 07/28/2022 12:00 AM    ALT (SGPT) 13 07/28/2022 12:00 AM    Alk. phosphatase 84 07/28/2022 12:00 AM    Protein, total 7.5 07/28/2022 12:00 AM    Albumin 5.1 (H) 07/28/2022 12:00 AM    Globulin 3.6 08/27/2021 09:28 PM     Prior Doppler Ultrasound reports personally reviewed by me and discussed with the patient.

## 2022-12-09 ENCOUNTER — OFFICE VISIT (OUTPATIENT)
Dept: ONCOLOGY | Age: 47
End: 2022-12-09
Payer: MEDICAID

## 2022-12-09 VITALS
OXYGEN SATURATION: 98 % | HEART RATE: 103 BPM | TEMPERATURE: 98.2 F | DIASTOLIC BLOOD PRESSURE: 98 MMHG | SYSTOLIC BLOOD PRESSURE: 136 MMHG | BODY MASS INDEX: 29.31 KG/M2 | WEIGHT: 149.3 LBS | HEIGHT: 60 IN

## 2022-12-09 DIAGNOSIS — I82.4Y1 ACUTE DEEP VEIN THROMBOSIS (DVT) OF PROXIMAL VEIN OF RIGHT LOWER EXTREMITY (HCC): Primary | ICD-10-CM

## 2022-12-09 NOTE — PATIENT INSTRUCTIONS
[facility] [place] [facility]  [address]: [phone]  F: [phone]    It was a pleasure to see you in clinic today. We will obtain a repeat ultrasound of your right leg. Please continue the Eliquis until we contact you with the results of your ultrasound.     Michelle Reyes MD

## 2022-12-09 NOTE — PROGRESS NOTES
Dk Deutsch is a 50 y.o. female  Chief Complaint   Patient presents with    Follow-up     acute VTE. 1. Have you been to the ER, urgent care clinic since your last visit? Hospitalized since your last visit? Yes, patient went to the ER twice for having pain on her right back shoulder blade. Pain level at a 10 out of 10 when she went. Patient was put pain meds and a muscle relaxer. Patient had an xray and MRI done at [place]. 2. Have you seen or consulted any other health care providers outside of the [address] [place] [address] since your last visit? Include any pap smears or colon screening.   Yes, patient went to see Eduard Bruno (Back Doc)

## 2022-12-09 NOTE — LETTER
12/9/2022    Patient: Germain Borden   YOB: 1975   Date of Visit: 12/9/2022     Briana Castorena, [address] [address]  Via In Basket    Dear Briana Castorena DO,      Thank you for referring Ms. Germain Borden to [address] [place] [address] for evaluation. My notes for this consultation are attached. If you have questions, please do not hesitate to call me. I look forward to following your patient along with you.       Sincerely,    Claudio Lawson MD

## 2022-12-14 ENCOUNTER — HOSPITAL ENCOUNTER (OUTPATIENT)
Dept: VASCULAR SURGERY | Age: 47
Discharge: HOME | End: 2022-12-14
Attending: INTERNAL MEDICINE
Payer: MEDICAID

## 2022-12-14 DIAGNOSIS — I82.4Y1 ACUTE DEEP VEIN THROMBOSIS (DVT) OF PROXIMAL VEIN OF RIGHT LOWER EXTREMITY (HCC): ICD-10-CM

## 2022-12-14 PROCEDURE — 93971 EXTREMITY STUDY: CPT

## 2022-12-14 NOTE — PROGRESS NOTES
Would you please call Ms. Mendez to let her know that her ultrasound shows a chronic clot in one of her calf veins? The other prior blood clot has completely resolved. She can discontinue the Eliquis as we do not recommend remaining on blood thinner for a chronic clot. Please advise her that she should reach out if she develops new or worsening swelling, leg pain, or redness.

## 2022-12-15 ENCOUNTER — TELEPHONE (OUTPATIENT)
Dept: ONCOLOGY | Age: 47
End: 2022-12-15

## 2022-12-15 ENCOUNTER — OFFICE VISIT (OUTPATIENT)
Dept: URGENT CARE | Age: 47
End: 2022-12-15
Payer: MEDICAID

## 2022-12-15 VITALS
SYSTOLIC BLOOD PRESSURE: 143 MMHG | DIASTOLIC BLOOD PRESSURE: 87 MMHG | BODY MASS INDEX: 28.12 KG/M2 | OXYGEN SATURATION: 99 % | TEMPERATURE: 98.4 F | RESPIRATION RATE: 16 BRPM | WEIGHT: 144 LBS | HEART RATE: 72 BPM

## 2022-12-15 DIAGNOSIS — J20.9 ACUTE BRONCHITIS, UNSPECIFIED ORGANISM: Primary | ICD-10-CM

## 2022-12-15 DIAGNOSIS — N39.0 ACUTE UTI: ICD-10-CM

## 2022-12-15 LAB
BILIRUB UR QL STRIP: NEGATIVE
GLUCOSE UR-MCNC: NEGATIVE MG/DL
KETONES P FAST UR STRIP-MCNC: NEGATIVE MG/DL
PH UR STRIP: 6.5 [PH] (ref 4.6–8)
PROT UR QL STRIP: NEGATIVE
SP GR UR STRIP: 1.02 (ref 1–1.03)
UA UROBILINOGEN AMB POC: NORMAL (ref 0.2–1)
URINALYSIS CLARITY POC: NORMAL
URINALYSIS COLOR POC: NORMAL
URINE BLOOD POC: NORMAL
URINE LEUKOCYTES POC: NEGATIVE
URINE NITRITES POC: NEGATIVE

## 2022-12-15 PROCEDURE — 3078F DIAST BP <80 MM HG: CPT | Performed by: NURSE PRACTITIONER

## 2022-12-15 PROCEDURE — 99203 OFFICE O/P NEW LOW 30 MIN: CPT | Performed by: NURSE PRACTITIONER

## 2022-12-15 PROCEDURE — 3074F SYST BP LT 130 MM HG: CPT | Performed by: NURSE PRACTITIONER

## 2022-12-15 PROCEDURE — 81003 URINALYSIS AUTO W/O SCOPE: CPT | Performed by: NURSE PRACTITIONER

## 2022-12-15 RX ORDER — BENZONATATE 200 MG/1
200 CAPSULE ORAL
Qty: 21 CAPSULE | Refills: 0 | Status: SHIPPED | OUTPATIENT
Start: 2022-12-15 | End: 2022-12-22

## 2022-12-15 RX ORDER — CEFDINIR 300 MG/1
300 CAPSULE ORAL 2 TIMES DAILY
Qty: 14 CAPSULE | Refills: 0 | Status: SHIPPED | OUTPATIENT
Start: 2022-12-15 | End: 2022-12-22

## 2022-12-15 NOTE — PROGRESS NOTES
Here for multiple concerns:    1. UTI  Burning, urgency and frequency onset past 2-3 days  Identical to UTI in past  Symptoms occur with each void  Denies: fever, chills, n/v, severe abdominal pain, flank pain, blood in urine, inability to push out urine, vaginal discharge    2.  Cough  Onset 2 weeks ago  Is a smoker  Denies fever, chills, chest pain, SOB  Overall not improving       Past Medical History:   Diagnosis Date    Anxiety disorder     Arthritis     Carpal tunnel syndrome on both sides     Dural tear     Headache     Hypertension     Migraines         Past Surgical History:   Procedure Laterality Date    HX LUMBAR FUSION      HX ORTHOPAEDIC  2009    bilateral CTS procedure    HX OTHER SURGICAL      tubes in ears as child         Family History   Problem Relation Age of Onset    Hypertension Mother     Stroke Father     Diabetes Father     Migraines Maternal Grandmother     Headache Maternal Grandmother         Social History     Socioeconomic History    Marital status:      Spouse name: Not on file    Number of children: Not on file    Years of education: Not on file    Highest education level: Not on file   Occupational History    Not on file   Tobacco Use    Smoking status: Former     Packs/day: 0.50     Types: Cigarettes     Quit date: 10/31/2021     Years since quittin.1    Smokeless tobacco: Never    Tobacco comments:     5 cigs a day   Vaping Use    Vaping Use: Never used   Substance and Sexual Activity    Alcohol use: Not Currently     Comment: socially    Drug use: No    Sexual activity: Yes     Partners: Male     Birth control/protection: None   Other Topics Concern    Not on file   Social History Narrative    Not on file     Social Determinants of Health     Financial Resource Strain: Medium Risk    Difficulty of Paying Living Expenses: Somewhat hard   Food Insecurity: Food Insecurity Present    Worried About Running Out of Food in the Last Year: Sometimes true    Ran Out of Food in the Last Year: Sometimes true   Transportation Needs: Not on file   Physical Activity: Not on file   Stress: Not on file   Social Connections: Not on file   Intimate Partner Violence: Not on file   Housing Stability: Not on file                ALLERGIES: Patient has no known allergies. Review of Systems   All other systems reviewed and are negative. Vitals:    12/15/22 1126 12/15/22 1128   BP: (!) 148/94 (!) 143/87   Pulse: 72    Resp: 16    Temp: 98.4 °F (36.9 °C)    SpO2: 99%    Weight: 144 lb (65.3 kg)        Physical Exam  Vitals reviewed. Constitutional:       General: She is not in acute distress. Appearance: Normal appearance. She is not ill-appearing. HENT:      Right Ear: Tympanic membrane normal.      Left Ear: Tympanic membrane normal.      Nose: Nose normal. No congestion or rhinorrhea. Mouth/Throat:      Mouth: Mucous membranes are moist.      Pharynx: No oropharyngeal exudate or posterior oropharyngeal erythema. Eyes:      Pupils: Pupils are equal, round, and reactive to light. Cardiovascular:      Rate and Rhythm: Normal rate and regular rhythm. Pulses: Normal pulses. Heart sounds: Normal heart sounds. Pulmonary:      Effort: Pulmonary effort is normal. No respiratory distress. Breath sounds: Normal breath sounds. No stridor. No wheezing, rhonchi or rales. Chest:      Chest wall: No tenderness. Abdominal:      General: Abdomen is flat. There is no distension. Tenderness: There is no abdominal tenderness. There is no rebound. Comments: Minimal suprapubic TTP. Otherwise abdomen non tender to palpation all quadrants. No guarding or masses. Musculoskeletal:      Cervical back: Normal range of motion and neck supple. No rigidity or tenderness. Skin:     Coloration: Skin is not pale. Findings: No rash. Neurological:      Mental Status: She is alert and oriented to person, place, and time.    Psychiatric:         Mood and Affect: Mood normal.         Behavior: Behavior normal.         Thought Content: Thought content normal.       MDM     Differential Diagnosis; Clinical Impression; Plan:       CLINICAL IMPRESSION:  (J20.9) Acute bronchitis, unspecified organism  (primary encounter diagnosis)  (N39.0) Acute UTI    Orders Placed This Encounter      cefdinir (OMNICEF) 300 mg capsule          Sig: Take 1 Capsule by mouth two (2) times a day for 7 days. Dispense:  14 Capsule          Refill:  0      benzonatate (TESSALON) 200 mg capsule          Sig: Take 1 Capsule by mouth three (3) times daily as needed for Cough for up to 7 days. Dispense:  21 Capsule          Refill:  0      Plan:  UA trace blood. No BHARGAV or NIT. Presentation does suggest UTI so will treat with omnicef. Maintain adequate fluid intake  Tessalon for bronchitis- lungs clear on exam no indication for CXR at this time. We have reviewed concerning signs/symptoms, normal vs abnormal progression of medical condition and when to seek immediate medical attention. Schedule with PCP or Urgent Care immediately for worsening or new symptoms. See your PCP if there is not at least some improvement in symptoms within the next 1 week  You should see your PCP for updates on your routine health maintenance.              Procedures

## 2022-12-15 NOTE — TELEPHONE ENCOUNTER
Jodi Kam verified     Informed patient that her current blood clot is considered chronic and she does need to remain on Eliquis. Informed patient that he original clot has dissolved. Patient agreed to report any new signs/symptoms of a clot , redness, warmth, swelling immediately. Thanked us for the call and is very grateful for the news.

## 2023-01-01 ENCOUNTER — APPOINTMENT (OUTPATIENT)
Facility: HOSPITAL | Age: 48
DRG: 720 | End: 2023-01-01
Payer: MEDICAID

## 2023-01-01 ENCOUNTER — HOSPITAL ENCOUNTER (INPATIENT)
Facility: HOSPITAL | Age: 48
LOS: 2 days | DRG: 720 | End: 2023-05-22
Attending: STUDENT IN AN ORGANIZED HEALTH CARE EDUCATION/TRAINING PROGRAM | Admitting: INTERNAL MEDICINE
Payer: MEDICAID

## 2023-01-01 DIAGNOSIS — J96.01 ACUTE RESPIRATORY FAILURE WITH HYPOXIA (HCC): ICD-10-CM

## 2023-01-01 DIAGNOSIS — F41.1 GENERALIZED ANXIETY DISORDER: Primary | ICD-10-CM

## 2023-01-01 DIAGNOSIS — F41.1 GENERALIZED ANXIETY DISORDER: ICD-10-CM

## 2023-01-01 DIAGNOSIS — R65.20 SEVERE SEPSIS (HCC): Primary | ICD-10-CM

## 2023-01-01 DIAGNOSIS — G00.9 BACTERIAL MENINGITIS: ICD-10-CM

## 2023-01-01 DIAGNOSIS — A41.9 SEVERE SEPSIS (HCC): Primary | ICD-10-CM

## 2023-01-01 LAB
ACCESSION NUMBER, LLC1M: ABNORMAL
ACINETOBACTER CALCOAC BAUMANNII COMPLEX BY PCR: NOT DETECTED
ALBUMIN SERPL-MCNC: 2 G/DL (ref 3.5–5)
ALBUMIN SERPL-MCNC: 2.4 G/DL (ref 3.5–5)
ALBUMIN SERPL-MCNC: 3.2 G/DL (ref 3.5–5)
ALBUMIN/GLOB SERPL: 0.5 (ref 1.1–2.2)
ALBUMIN/GLOB SERPL: 0.6 (ref 1.1–2.2)
ALBUMIN/GLOB SERPL: 0.7 (ref 1.1–2.2)
ALP SERPL-CCNC: 110 U/L (ref 45–117)
ALP SERPL-CCNC: 133 U/L (ref 45–117)
ALP SERPL-CCNC: 145 U/L (ref 45–117)
ALT SERPL-CCNC: 115 U/L (ref 12–78)
ALT SERPL-CCNC: 54 U/L (ref 12–78)
ALT SERPL-CCNC: 81 U/L (ref 12–78)
ANION GAP BLD CALC-SCNC: 10 (ref 10–20)
ANION GAP SERPL CALC-SCNC: 1 MMOL/L (ref 5–15)
ANION GAP SERPL CALC-SCNC: 1 MMOL/L (ref 5–15)
ANION GAP SERPL CALC-SCNC: 5 MMOL/L (ref 5–15)
ANION GAP SERPL CALC-SCNC: 9 MMOL/L (ref 5–15)
APPEARANCE CSF: ABNORMAL
APPEARANCE UR: ABNORMAL
APPEARANCE UR: CLEAR
ARTERIAL PATENCY WRIST A: POSITIVE
ARTERIAL PATENCY WRIST A: YES
ARTERIAL PATENCY WRIST A: YES
AST SERPL-CCNC: 12 U/L (ref 15–37)
AST SERPL-CCNC: 26 U/L (ref 15–37)
AST SERPL-CCNC: 40 U/L (ref 15–37)
B FRAGILIS DNA BLD POS QL NAA+NON-PROBE: NOT DETECTED
BACTERIA SPEC CULT: ABNORMAL
BACTERIA SPEC CULT: NORMAL
BACTERIA URNS QL MICRO: ABNORMAL /HPF
BACTERIA URNS QL MICRO: NEGATIVE /HPF
BASE DEFICIT BLD-SCNC: 0.9 MMOL/L
BASE DEFICIT BLD-SCNC: 1.6 MMOL/L
BASE DEFICIT BLDA-SCNC: 1.4 MMOL/L
BASE DEFICIT BLDA-SCNC: 1.5 MMOL/L
BASOPHILS # BLD: 0 K/UL (ref 0–0.1)
BASOPHILS # BLD: 0.1 K/UL (ref 0–0.1)
BASOPHILS # BLD: NORMAL K/UL (ref 0–0.1)
BASOPHILS NFR BLD: 0 % (ref 0–1)
BASOPHILS NFR BLD: 1 % (ref 0–1)
BDY SITE: ABNORMAL
BILIRUB SERPL-MCNC: 0.4 MG/DL (ref 0.2–1)
BILIRUB SERPL-MCNC: 0.5 MG/DL (ref 0.2–1)
BILIRUB SERPL-MCNC: 0.6 MG/DL (ref 0.2–1)
BILIRUB UR QL: NEGATIVE
BILIRUB UR QL: NEGATIVE
BIOFIRE TEST COMMENT: ABNORMAL
BUN SERPL-MCNC: 13 MG/DL (ref 6–20)
BUN SERPL-MCNC: 16 MG/DL (ref 6–20)
BUN SERPL-MCNC: 16 MG/DL (ref 6–20)
BUN SERPL-MCNC: 21 MG/DL (ref 6–20)
BUN/CREAT SERPL: 19 (ref 12–20)
BUN/CREAT SERPL: 20 (ref 12–20)
BUN/CREAT SERPL: 24 (ref 12–20)
BUN/CREAT SERPL: 28 (ref 12–20)
C ALBICANS DNA BLD POS QL NAA+NON-PROBE: NOT DETECTED
C AURIS DNA BLD POS QL NAA+NON-PROBE: NOT DETECTED
C GATTII+NEOFOR DNA BLD POS QL NAA+N-PRB: NOT DETECTED
C GATTII+NEOFOR DNA CSF QL NAA+NON-PROBE: NOT DETECTED
C GLABRATA DNA BLD POS QL NAA+NON-PROBE: NOT DETECTED
C KRUSEI DNA BLD POS QL NAA+NON-PROBE: NOT DETECTED
C PARAP DNA BLD POS QL NAA+NON-PROBE: NOT DETECTED
C TROPICLS DNA BLD POS QL NAA+NON-PROBE: NOT DETECTED
CA-I BLD-MCNC: 1.26 MMOL/L (ref 1.12–1.32)
CALCIUM SERPL-MCNC: 10 MG/DL (ref 8.5–10.1)
CALCIUM SERPL-MCNC: 8.6 MG/DL (ref 8.5–10.1)
CALCIUM SERPL-MCNC: 9.2 MG/DL (ref 8.5–10.1)
CALCIUM SERPL-MCNC: 9.5 MG/DL (ref 8.5–10.1)
CHLORIDE BLD-SCNC: 102 MMOL/L (ref 100–108)
CHLORIDE SERPL-SCNC: 103 MMOL/L (ref 97–108)
CHLORIDE SERPL-SCNC: 109 MMOL/L (ref 97–108)
CHLORIDE SERPL-SCNC: 117 MMOL/L (ref 97–108)
CHLORIDE SERPL-SCNC: 144 MMOL/L (ref 97–108)
CMV DNA CSF QL NAA+NON-PROBE: NOT DETECTED
CO2 BLD-SCNC: 24 MMOL/L (ref 19–24)
CO2 SERPL-SCNC: 21 MMOL/L (ref 21–32)
CO2 SERPL-SCNC: 22 MMOL/L (ref 21–32)
CO2 SERPL-SCNC: 22 MMOL/L (ref 21–32)
CO2 SERPL-SCNC: 25 MMOL/L (ref 21–32)
COLOR CSF: ABNORMAL
COLOR SPUN CSF: YELLOW
COLOR UR: ABNORMAL
COLOR UR: ABNORMAL
COMMENT:: NORMAL
CREAT SERPL-MCNC: 0.58 MG/DL (ref 0.55–1.02)
CREAT SERPL-MCNC: 0.66 MG/DL (ref 0.55–1.02)
CREAT SERPL-MCNC: 0.7 MG/DL (ref 0.55–1.02)
CREAT SERPL-MCNC: 1.03 MG/DL (ref 0.55–1.02)
CREAT UR-MCNC: 0.4 MG/DL (ref 0.6–1.3)
DIFFERENTIAL METHOD BLD: ABNORMAL
DIFFERENTIAL METHOD BLD: NORMAL
E CLOAC COMP DNA BLD POS NAA+NON-PROBE: NOT DETECTED
E COLI DNA BLD POS QL NAA+NON-PROBE: NOT DETECTED
E COLI K1 DNA CSF QL NAA+NON-PROBE: NOT DETECTED
E FAECALIS DNA BLD POS QL NAA+NON-PROBE: NOT DETECTED
E FAECIUM DNA BLD POS QL NAA+NON-PROBE: NOT DETECTED
EKG ATRIAL RATE: 67 BPM
EKG DIAGNOSIS: NORMAL
EKG P AXIS: 70 DEGREES
EKG P-R INTERVAL: 170 MS
EKG Q-T INTERVAL: 476 MS
EKG QRS DURATION: 92 MS
EKG QTC CALCULATION (BAZETT): 502 MS
EKG R AXIS: 38 DEGREES
EKG T AXIS: 33 DEGREES
EKG VENTRICULAR RATE: 67 BPM
ENTEROBACTERALES DNA BLD POS NAA+N-PRB: NOT DETECTED
EOSINOPHIL # BLD: 0 K/UL (ref 0–0.4)
EOSINOPHIL # BLD: NORMAL K/UL (ref 0–0.4)
EOSINOPHIL NFR BLD: 0 % (ref 0–7)
EPITH CASTS URNS QL MICRO: ABNORMAL /LPF
EPITH CASTS URNS QL MICRO: ABNORMAL /LPF
ERYTHROCYTE [DISTWIDTH] IN BLOOD BY AUTOMATED COUNT: 13.7 % (ref 11.5–14.5)
ERYTHROCYTE [DISTWIDTH] IN BLOOD BY AUTOMATED COUNT: 14.1 % (ref 11.5–14.5)
ERYTHROCYTE [DISTWIDTH] IN BLOOD BY AUTOMATED COUNT: 14.1 % (ref 11.5–14.5)
ERYTHROCYTE [DISTWIDTH] IN BLOOD BY AUTOMATED COUNT: 15.2 % (ref 11.5–14.5)
ERYTHROCYTE [DISTWIDTH] IN BLOOD BY AUTOMATED COUNT: NORMAL % (ref 11.5–14.5)
EV RNA CSF QL NAA+NON-PROBE: NOT DETECTED
FIO2 ON VENT: 40 %
GAS FLOW.O2 O2 DELIVERY SYS: ABNORMAL
GAS FLOW.O2 SETTING OXYMISER: 16
GAS FLOW.O2 SETTING OXYMISER: 16 BPM
GLOBULIN SER CALC-MCNC: 4 G/DL (ref 2–4)
GLOBULIN SER CALC-MCNC: 4.3 G/DL (ref 2–4)
GLOBULIN SER CALC-MCNC: 4.5 G/DL (ref 2–4)
GLUCOSE BLD STRIP.AUTO-MCNC: 101 MG/DL (ref 65–117)
GLUCOSE BLD STRIP.AUTO-MCNC: 102 MG/DL (ref 65–117)
GLUCOSE BLD STRIP.AUTO-MCNC: 123 MG/DL (ref 65–117)
GLUCOSE BLD STRIP.AUTO-MCNC: 141 MG/DL (ref 65–117)
GLUCOSE BLD STRIP.AUTO-MCNC: 143 MG/DL (ref 65–117)
GLUCOSE BLD STRIP.AUTO-MCNC: 155 MG/DL (ref 74–106)
GLUCOSE BLD STRIP.AUTO-MCNC: 172 MG/DL (ref 65–117)
GLUCOSE BLD STRIP.AUTO-MCNC: 173 MG/DL (ref 65–117)
GLUCOSE BLD STRIP.AUTO-MCNC: 179 MG/DL (ref 65–117)
GLUCOSE BLD STRIP.AUTO-MCNC: 206 MG/DL (ref 65–117)
GLUCOSE BLD STRIP.AUTO-MCNC: 330 MG/DL (ref 65–117)
GLUCOSE BLD STRIP.AUTO-MCNC: 361 MG/DL (ref 65–117)
GLUCOSE BLD STRIP.AUTO-MCNC: 384 MG/DL (ref 65–117)
GLUCOSE CSF-MCNC: 2 MG/DL (ref 40–70)
GLUCOSE SERPL-MCNC: 120 MG/DL (ref 65–100)
GLUCOSE SERPL-MCNC: 123 MG/DL (ref 65–100)
GLUCOSE SERPL-MCNC: 169 MG/DL (ref 65–100)
GLUCOSE SERPL-MCNC: 207 MG/DL (ref 65–100)
GLUCOSE UR STRIP.AUTO-MCNC: 250 MG/DL
GLUCOSE UR STRIP.AUTO-MCNC: 500 MG/DL
GP B STREP DNA BLD POS QL NAA+NON-PROBE: NOT DETECTED
GP B STREP DNA CSF QL NAA+NON-PROBE: NOT DETECTED
GRAM STN SPEC: ABNORMAL
HAEM INFLU DNA BLD POS QL NAA+NON-PROBE: NOT DETECTED
HAEM INFLU DNA CSF QL NAA+NON-PROBE: NOT DETECTED
HCO3 BLD-SCNC: 24.6 MMOL/L (ref 22–26)
HCO3 BLDA-SCNC: 21 MMOL/L (ref 22–26)
HCO3 BLDA-SCNC: 22 MMOL/L (ref 22–26)
HCO3 BLDA-SCNC: 24 MMOL/L
HCT VFR BLD AUTO: 30.6 % (ref 35–47)
HCT VFR BLD AUTO: 32.9 % (ref 35–47)
HCT VFR BLD AUTO: 34.8 % (ref 35–47)
HCT VFR BLD AUTO: 36.2 % (ref 35–47)
HCT VFR BLD AUTO: NORMAL % (ref 35–47)
HGB BLD-MCNC: 10.8 G/DL (ref 11.5–16)
HGB BLD-MCNC: 10.9 G/DL (ref 11.5–16)
HGB BLD-MCNC: 11.9 G/DL (ref 11.5–16)
HGB BLD-MCNC: 9.9 G/DL (ref 11.5–16)
HGB BLD-MCNC: NORMAL G/DL (ref 11.5–16)
HGB UR QL STRIP: ABNORMAL
HGB UR QL STRIP: NEGATIVE
HHV6 DNA CSF QL NAA+NON-PROBE: NOT DETECTED
HIV 1+2 AB+HIV1 P24 AG SERPL QL IA: NONREACTIVE
HIV 1/2 RESULT COMMENT: NORMAL
HSV1 DNA CSF QL NAA+PROBE: NOT DETECTED
HSV2 DNA CSF QL NAA+NON-PROBE: NOT DETECTED
HYALINE CASTS URNS QL MICRO: ABNORMAL /LPF (ref 0–5)
IMM GRANULOCYTES # BLD AUTO: 0 K/UL
IMM GRANULOCYTES # BLD AUTO: NORMAL K/UL (ref 0–0.04)
IMM GRANULOCYTES NFR BLD AUTO: 0 %
IMM GRANULOCYTES NFR BLD AUTO: NORMAL % (ref 0–0.5)
INR PPP: 1.1 (ref 0.9–1.1)
K OXYTOCA DNA BLD POS QL NAA+NON-PROBE: NOT DETECTED
KETONES UR QL STRIP.AUTO: NEGATIVE MG/DL
KETONES UR QL STRIP.AUTO: NEGATIVE MG/DL
KLEBSIELLA SP DNA BLD POS QL NAA+NON-PRB: NOT DETECTED
KLEBSIELLA SP DNA BLD POS QL NAA+NON-PRB: NOT DETECTED
L MONOCYTOG DNA BLD POS QL NAA+NON-PROBE: NOT DETECTED
L MONOCYTOG DNA CSF QL NAA+NON-PROBE: NOT DETECTED
LACTATE BLD-SCNC: 2.65 MMOL/L (ref 0.4–2)
LACTATE SERPL-SCNC: 1.6 MMOL/L (ref 0.4–2)
LACTATE SERPL-SCNC: 2 MMOL/L (ref 0.4–2)
LACTATE SERPL-SCNC: 3 MMOL/L (ref 0.4–2)
LEUKOCYTE ESTERASE UR QL STRIP.AUTO: NEGATIVE
LEUKOCYTE ESTERASE UR QL STRIP.AUTO: NEGATIVE
LYMPHOCYTES # BLD: 0.3 K/UL (ref 0.8–3.5)
LYMPHOCYTES # BLD: 0.4 K/UL (ref 0.8–3.5)
LYMPHOCYTES # BLD: 0.7 K/UL (ref 0.8–3.5)
LYMPHOCYTES # BLD: 1 K/UL (ref 0.8–3.5)
LYMPHOCYTES # BLD: NORMAL K/UL (ref 0.8–3.5)
LYMPHOCYTES NFR BLD: 3 % (ref 12–49)
LYMPHOCYTES NFR BLD: 3 % (ref 12–49)
LYMPHOCYTES NFR BLD: 5 % (ref 12–49)
LYMPHOCYTES NFR BLD: 7 % (ref 12–49)
LYMPHOCYTES NFR CSF MANUAL: 10 % (ref 28–96)
MACROPHAGES NFR CSF MANUAL: 1 %
MCH RBC QN AUTO: 29 PG (ref 26–34)
MCH RBC QN AUTO: 29 PG (ref 26–34)
MCH RBC QN AUTO: 29.6 PG (ref 26–34)
MCH RBC QN AUTO: 29.7 PG (ref 26–34)
MCH RBC QN AUTO: NORMAL PG (ref 26–34)
MCHC RBC AUTO-ENTMCNC: 31.3 G/DL (ref 30–36.5)
MCHC RBC AUTO-ENTMCNC: 32.4 G/DL (ref 30–36.5)
MCHC RBC AUTO-ENTMCNC: 32.8 G/DL (ref 30–36.5)
MCHC RBC AUTO-ENTMCNC: 32.9 G/DL (ref 30–36.5)
MCHC RBC AUTO-ENTMCNC: NORMAL G/DL (ref 30–36.5)
MCV RBC AUTO: 89.7 FL (ref 80–99)
MCV RBC AUTO: 90 FL (ref 80–99)
MCV RBC AUTO: 90.4 FL (ref 80–99)
MCV RBC AUTO: 92.6 FL (ref 80–99)
MCV RBC AUTO: NORMAL FL (ref 80–99)
MECA+MECC+MREJ ISLT/SPM QL: NOT DETECTED
MONOCYTES # BLD: 0.3 K/UL (ref 0–1)
MONOCYTES # BLD: 0.4 K/UL (ref 0–1)
MONOCYTES # BLD: 0.7 K/UL (ref 0–1)
MONOCYTES # BLD: 1.1 K/UL (ref 0–1)
MONOCYTES # BLD: NORMAL K/UL (ref 0–1)
MONOCYTES NFR BLD: 10 % (ref 5–13)
MONOCYTES NFR BLD: 2 % (ref 5–13)
MONOCYTES NFR BLD: 3 % (ref 5–13)
MONOCYTES NFR BLD: 6 % (ref 5–13)
MONOCYTES NFR CSF MANUAL: 9 % (ref 16–56)
N MEN DNA BLD POS QL NAA+NON-PROBE: NOT DETECTED
N MEN DNA CSF QL NAA+NON-PROBE: NOT DETECTED
NEUTROPHILS NFR CSF MANUAL: 80 % (ref 0–7)
NEUTS BAND NFR BLD MANUAL: 1 % (ref 0–6)
NEUTS BAND NFR BLD MANUAL: 3 % (ref 0–6)
NEUTS SEG # BLD: 10.4 K/UL (ref 1.8–8)
NEUTS SEG # BLD: 11.3 K/UL (ref 1.8–8)
NEUTS SEG # BLD: 19.5 K/UL (ref 1.8–8)
NEUTS SEG # BLD: 8.8 K/UL (ref 1.8–8)
NEUTS SEG NFR BLD: 82 % (ref 32–75)
NEUTS SEG NFR BLD: 90 % (ref 32–75)
NEUTS SEG NFR BLD: 91 % (ref 32–75)
NEUTS SEG NFR BLD: 93 % (ref 32–75)
NITRITE UR QL STRIP.AUTO: NEGATIVE
NITRITE UR QL STRIP.AUTO: NEGATIVE
NRBC # BLD: 0 K/UL (ref 0–0.01)
NRBC # BLD: NORMAL K/UL (ref 0–0.01)
NRBC BLD-RTO: 0 PER 100 WBC
NRBC BLD-RTO: NORMAL PER 100 WBC
O2/TOTAL GAS SETTING VFR VENT: 100 %
OSMOLALITY UR: 204 MOSM/KG H2O
P AERUGINOSA DNA BLD POS NAA+NON-PROBE: NOT DETECTED
PARECHOVIRUS A RNA CSF QL NAA+NON-PROBE: NOT DETECTED
PATH REV BLD -IMP: ABNORMAL
PCO2 BLD: 43.1 MMHG (ref 35–45)
PCO2 BLDA: 29 MMHG (ref 35–45)
PCO2 BLDA: 35 MMHG (ref 35–45)
PCO2 BLDV: 43 MMHG (ref 41–51)
PEEP RESPIRATORY: 5
PEEP RESPIRATORY: 5 CMH2O
PH BLD: 7.36 (ref 7.35–7.45)
PH BLDA: 7.43 (ref 7.35–7.45)
PH BLDA: 7.48 (ref 7.35–7.45)
PH BLDV: 7.36 (ref 7.32–7.42)
PH UR STRIP: 5.5 (ref 5–8)
PH UR STRIP: 6 (ref 5–8)
PLATELET # BLD AUTO: 211 K/UL (ref 150–400)
PLATELET # BLD AUTO: 222 K/UL (ref 150–400)
PLATELET # BLD AUTO: 223 K/UL (ref 150–400)
PLATELET # BLD AUTO: 232 K/UL (ref 150–400)
PLATELET # BLD AUTO: NORMAL K/UL (ref 150–400)
PMV BLD AUTO: 10.1 FL (ref 8.9–12.9)
PMV BLD AUTO: 10.1 FL (ref 8.9–12.9)
PMV BLD AUTO: 9.6 FL (ref 8.9–12.9)
PMV BLD AUTO: 9.8 FL (ref 8.9–12.9)
PMV BLD AUTO: NORMAL FL (ref 8.9–12.9)
PO2 BLD: 148 MMHG (ref 80–100)
PO2 BLDA: 124 MMHG (ref 80–100)
PO2 BLDA: 137 MMHG (ref 80–100)
PO2 BLDV: 190 MMHG (ref 25–40)
POTASSIUM BLD-SCNC: 3.3 MMOL/L (ref 3.5–5.5)
POTASSIUM SERPL-SCNC: 3.1 MMOL/L (ref 3.5–5.1)
POTASSIUM SERPL-SCNC: 3.4 MMOL/L (ref 3.5–5.1)
POTASSIUM SERPL-SCNC: 3.7 MMOL/L (ref 3.5–5.1)
POTASSIUM SERPL-SCNC: 4.2 MMOL/L (ref 3.5–5.1)
PROCALCITONIN SERPL-MCNC: 1.68 NG/ML
PROCALCITONIN SERPL-MCNC: 2.94 NG/ML
PROCALCITONIN SERPL-MCNC: 3.27 NG/ML
PROCALCITONIN SERPL-MCNC: 3.52 NG/ML
PROT CSF-MCNC: >250 MG/DL (ref 15–45)
PROT SERPL-MCNC: 6.3 G/DL (ref 6.4–8.2)
PROT SERPL-MCNC: 6.4 G/DL (ref 6.4–8.2)
PROT SERPL-MCNC: 7.7 G/DL (ref 6.4–8.2)
PROT UR STRIP-MCNC: 100 MG/DL
PROT UR STRIP-MCNC: 30 MG/DL
PROTEUS SP DNA BLD POS QL NAA+NON-PROBE: NOT DETECTED
PROTHROMBIN TIME: 11.4 SEC (ref 9–11.1)
RBC # BLD AUTO: 3.41 M/UL (ref 3.8–5.2)
RBC # BLD AUTO: 3.64 M/UL (ref 3.8–5.2)
RBC # BLD AUTO: 3.76 M/UL (ref 3.8–5.2)
RBC # BLD AUTO: 4.02 M/UL (ref 3.8–5.2)
RBC # BLD AUTO: NORMAL M/UL (ref 3.8–5.2)
RBC # CSF: 2750 /CU MM
RBC #/AREA URNS HPF: ABNORMAL /HPF (ref 0–5)
RBC #/AREA URNS HPF: ABNORMAL /HPF (ref 0–5)
RBC MORPH BLD: ABNORMAL
RESISTANT GENE TARGETS: ABNORMAL
S AUREUS DNA BLD POS QL NAA+NON-PROBE: DETECTED
S AUREUS+CONS DNA BLD POS NAA+NON-PROBE: DETECTED
S EPIDERMIDIS DNA BLD POS QL NAA+NON-PRB: NOT DETECTED
S LUGDUNENSIS DNA BLD POS QL NAA+NON-PRB: NOT DETECTED
S MALTOPHILIA DNA BLD POS QL NAA+NON-PRB: NOT DETECTED
S MARCESCENS DNA BLD POS NAA+NON-PROBE: NOT DETECTED
S PNEUM DNA BLD POS QL NAA+NON-PROBE: NOT DETECTED
S PNEUM DNA CSF QL NAA+NON-PROBE: NOT DETECTED
S PYO DNA BLD POS QL NAA+NON-PROBE: NOT DETECTED
SALMONELLA DNA BLD POS QL NAA+NON-PROBE: NOT DETECTED
SAO2 % BLD: 100 %
SAO2 % BLD: 99 % (ref 92–97)
SAO2 % BLD: 99 % (ref 92–97)
SAO2 % BLD: 99.2 % (ref 92–97)
SAO2% DEVICE SAO2% SENSOR NAME: ABNORMAL
SAO2% DEVICE SAO2% SENSOR NAME: ABNORMAL
SERVICE CMNT-IMP: ABNORMAL
SERVICE CMNT-IMP: NORMAL
SODIUM BLD-SCNC: 136 MMOL/L (ref 136–145)
SODIUM SERPL-SCNC: 134 MMOL/L (ref 136–145)
SODIUM SERPL-SCNC: 135 MMOL/L (ref 136–145)
SODIUM SERPL-SCNC: 143 MMOL/L (ref 136–145)
SODIUM SERPL-SCNC: 163 MMOL/L (ref 136–145)
SODIUM SERPL-SCNC: 166 MMOL/L (ref 136–145)
SODIUM SERPL-SCNC: 166 MMOL/L (ref 136–145)
SODIUM SERPL-SCNC: 167 MMOL/L (ref 136–145)
SODIUM SERPL-SCNC: 169 MMOL/L (ref 136–145)
SODIUM UR-SCNC: 11 MMOL/L
SP GR UR REFRACTOMETRY: 1.01 (ref 1–1.03)
SP GR UR REFRACTOMETRY: 1.02 (ref 1–1.03)
SPECIMEN HOLD: NORMAL
SPECIMEN SITE: ABNORMAL
SPECIMEN TYPE: ABNORMAL
STREPTOCOCCUS DNA BLD POS NAA+NON-PROBE: NOT DETECTED
TROPONIN I SERPL HS-MCNC: 310 NG/L (ref 0–37)
TUBE # CSF: 1
TUBE # CSF: 3
TUBE # CSF: 3
UROBILINOGEN UR QL STRIP.AUTO: 0.2 EU/DL (ref 0.2–1)
UROBILINOGEN UR QL STRIP.AUTO: 1 EU/DL (ref 0.2–1)
VANCOMYCIN SERPL-MCNC: 17.8 UG/ML
VANCOMYCIN SERPL-MCNC: NORMAL UG/ML
VENTILATION MODE VENT: ABNORMAL
VENTILATION MODE VENT: ABNORMAL
VT SETTING VENT: 320 ML
VT SETTING VENT: 360
VZV DNA CSF QL NAA+NON-PROBE: NOT DETECTED
WBC # BLD AUTO: 10.6 K/UL (ref 3.6–11)
WBC # BLD AUTO: 11.1 K/UL (ref 3.6–11)
WBC # BLD AUTO: 12.4 K/UL (ref 3.6–11)
WBC # BLD AUTO: 20.9 K/UL (ref 3.6–11)
WBC # BLD AUTO: NORMAL K/UL (ref 3.6–11)
WBC # CSF: ABNORMAL /CU MM (ref 0–5)
WBC MORPH BLD: ABNORMAL
WBC URNS QL MICRO: ABNORMAL /HPF (ref 0–4)
WBC URNS QL MICRO: ABNORMAL /HPF (ref 0–4)

## 2023-01-01 PROCEDURE — 87147 CULTURE TYPE IMMUNOLOGIC: CPT

## 2023-01-01 PROCEDURE — 87150 DNA/RNA AMPLIFIED PROBE: CPT

## 2023-01-01 PROCEDURE — 83935 ASSAY OF URINE OSMOLALITY: CPT

## 2023-01-01 PROCEDURE — 6370000000 HC RX 637 (ALT 250 FOR IP): Performed by: NURSE PRACTITIONER

## 2023-01-01 PROCEDURE — 82962 GLUCOSE BLOOD TEST: CPT

## 2023-01-01 PROCEDURE — 6360000002 HC RX W HCPCS: Performed by: INTERNAL MEDICINE

## 2023-01-01 PROCEDURE — 95822 EEG COMA OR SLEEP ONLY: CPT

## 2023-01-01 PROCEDURE — 85025 COMPLETE CBC W/AUTO DIFF WBC: CPT

## 2023-01-01 PROCEDURE — 71045 X-RAY EXAM CHEST 1 VIEW: CPT

## 2023-01-01 PROCEDURE — 6360000002 HC RX W HCPCS: Performed by: PSYCHIATRY & NEUROLOGY

## 2023-01-01 PROCEDURE — 84157 ASSAY OF PROTEIN OTHER: CPT

## 2023-01-01 PROCEDURE — 6360000002 HC RX W HCPCS: Performed by: STUDENT IN AN ORGANIZED HEALTH CARE EDUCATION/TRAINING PROGRAM

## 2023-01-01 PROCEDURE — 6360000002 HC RX W HCPCS: Performed by: NURSE PRACTITIONER

## 2023-01-01 PROCEDURE — 6370000000 HC RX 637 (ALT 250 FOR IP): Performed by: INTERNAL MEDICINE

## 2023-01-01 PROCEDURE — 31500 INSERT EMERGENCY AIRWAY: CPT

## 2023-01-01 PROCEDURE — 02HV33Z INSERTION OF INFUSION DEVICE INTO SUPERIOR VENA CAVA, PERCUTANEOUS APPROACH: ICD-10-PCS | Performed by: ANESTHESIOLOGY

## 2023-01-01 PROCEDURE — 84145 PROCALCITONIN (PCT): CPT

## 2023-01-01 PROCEDURE — 87040 BLOOD CULTURE FOR BACTERIA: CPT

## 2023-01-01 PROCEDURE — 62270 DX LMBR SPI PNXR: CPT

## 2023-01-01 PROCEDURE — 2580000003 HC RX 258: Performed by: STUDENT IN AN ORGANIZED HEALTH CARE EDUCATION/TRAINING PROGRAM

## 2023-01-01 PROCEDURE — 009U3ZX DRAINAGE OF SPINAL CANAL, PERCUTANEOUS APPROACH, DIAGNOSTIC: ICD-10-PCS | Performed by: STUDENT IN AN ORGANIZED HEALTH CARE EDUCATION/TRAINING PROGRAM

## 2023-01-01 PROCEDURE — 2000000000 HC ICU R&B

## 2023-01-01 PROCEDURE — 6360000004 HC RX CONTRAST MEDICATION

## 2023-01-01 PROCEDURE — 2580000003 HC RX 258: Performed by: PSYCHIATRY & NEUROLOGY

## 2023-01-01 PROCEDURE — 36556 INSERT NON-TUNNEL CV CATH: CPT

## 2023-01-01 PROCEDURE — 94761 N-INVAS EAR/PLS OXIMETRY MLT: CPT

## 2023-01-01 PROCEDURE — 2500000003 HC RX 250 WO HCPCS: Performed by: NURSE PRACTITIONER

## 2023-01-01 PROCEDURE — 2580000003 HC RX 258: Performed by: INTERNAL MEDICINE

## 2023-01-01 PROCEDURE — 3430000000 HC RX DIAGNOSTIC RADIOPHARMACEUTICAL: Performed by: PSYCHIATRY & NEUROLOGY

## 2023-01-01 PROCEDURE — 83605 ASSAY OF LACTIC ACID: CPT

## 2023-01-01 PROCEDURE — 2500000003 HC RX 250 WO HCPCS: Performed by: INTERNAL MEDICINE

## 2023-01-01 PROCEDURE — A4216 STERILE WATER/SALINE, 10 ML: HCPCS | Performed by: INTERNAL MEDICINE

## 2023-01-01 PROCEDURE — 99232 SBSQ HOSP IP/OBS MODERATE 35: CPT | Performed by: PSYCHIATRY & NEUROLOGY

## 2023-01-01 PROCEDURE — 85610 PROTHROMBIN TIME: CPT

## 2023-01-01 PROCEDURE — 96375 TX/PRO/DX INJ NEW DRUG ADDON: CPT

## 2023-01-01 PROCEDURE — 84132 ASSAY OF SERUM POTASSIUM: CPT

## 2023-01-01 PROCEDURE — 87086 URINE CULTURE/COLONY COUNT: CPT

## 2023-01-01 PROCEDURE — 36620 INSERTION CATHETER ARTERY: CPT

## 2023-01-01 PROCEDURE — 95816 EEG AWAKE AND DROWSY: CPT | Performed by: PSYCHIATRY & NEUROLOGY

## 2023-01-01 PROCEDURE — 70498 CT ANGIOGRAPHY NECK: CPT

## 2023-01-01 PROCEDURE — 84484 ASSAY OF TROPONIN QUANT: CPT

## 2023-01-01 PROCEDURE — 70450 CT HEAD/BRAIN W/O DYE: CPT

## 2023-01-01 PROCEDURE — C1751 CATH, INF, PER/CENT/MIDLINE: HCPCS

## 2023-01-01 PROCEDURE — 0042T CT BRAIN PERFUSION: CPT

## 2023-01-01 PROCEDURE — 87389 HIV-1 AG W/HIV-1&-2 AB AG IA: CPT

## 2023-01-01 PROCEDURE — 5A1945Z RESPIRATORY VENTILATION, 24-96 CONSECUTIVE HOURS: ICD-10-PCS | Performed by: EMERGENCY MEDICINE

## 2023-01-01 PROCEDURE — 80053 COMPREHEN METABOLIC PANEL: CPT

## 2023-01-01 PROCEDURE — 89050 BODY FLUID CELL COUNT: CPT

## 2023-01-01 PROCEDURE — 81001 URINALYSIS AUTO W/SCOPE: CPT

## 2023-01-01 PROCEDURE — 6360000002 HC RX W HCPCS

## 2023-01-01 PROCEDURE — 2580000003 HC RX 258: Performed by: NURSE PRACTITIONER

## 2023-01-01 PROCEDURE — 87205 SMEAR GRAM STAIN: CPT

## 2023-01-01 PROCEDURE — 03HB33Z INSERTION OF INFUSION DEVICE INTO RIGHT RADIAL ARTERY, PERCUTANEOUS APPROACH: ICD-10-PCS | Performed by: ANESTHESIOLOGY

## 2023-01-01 PROCEDURE — 82947 ASSAY GLUCOSE BLOOD QUANT: CPT

## 2023-01-01 PROCEDURE — 94002 VENT MGMT INPAT INIT DAY: CPT

## 2023-01-01 PROCEDURE — 87483 CNS DNA AMP PROBE TYPE 12-25: CPT

## 2023-01-01 PROCEDURE — 36415 COLL VENOUS BLD VENIPUNCTURE: CPT

## 2023-01-01 PROCEDURE — 87186 SC STD MICRODIL/AGAR DIL: CPT

## 2023-01-01 PROCEDURE — 82945 GLUCOSE OTHER FLUID: CPT

## 2023-01-01 PROCEDURE — 95717 EEG PHYS/QHP 2-12 HR W/O VID: CPT | Performed by: PSYCHIATRY & NEUROLOGY

## 2023-01-01 PROCEDURE — 99223 1ST HOSP IP/OBS HIGH 75: CPT | Performed by: PSYCHIATRY & NEUROLOGY

## 2023-01-01 PROCEDURE — A9579 GAD-BASE MR CONTRAST NOS,1ML: HCPCS

## 2023-01-01 PROCEDURE — 80202 ASSAY OF VANCOMYCIN: CPT

## 2023-01-01 PROCEDURE — 87077 CULTURE AEROBIC IDENTIFY: CPT

## 2023-01-01 PROCEDURE — APPNB30 APP NON BILLABLE TIME 0-30 MINS

## 2023-01-01 PROCEDURE — 99285 EMERGENCY DEPT VISIT HI MDM: CPT

## 2023-01-01 PROCEDURE — 74018 RADEX ABDOMEN 1 VIEW: CPT

## 2023-01-01 PROCEDURE — A9539 TC99M PENTETATE: HCPCS | Performed by: PSYCHIATRY & NEUROLOGY

## 2023-01-01 PROCEDURE — 84300 ASSAY OF URINE SODIUM: CPT

## 2023-01-01 PROCEDURE — 82330 ASSAY OF CALCIUM: CPT

## 2023-01-01 PROCEDURE — 70553 MRI BRAIN STEM W/O & W/DYE: CPT

## 2023-01-01 PROCEDURE — 84295 ASSAY OF SERUM SODIUM: CPT

## 2023-01-01 PROCEDURE — 99223 1ST HOSP IP/OBS HIGH 75: CPT | Performed by: STUDENT IN AN ORGANIZED HEALTH CARE EDUCATION/TRAINING PROGRAM

## 2023-01-01 PROCEDURE — C1894 INTRO/SHEATH, NON-LASER: HCPCS

## 2023-01-01 PROCEDURE — 6360000004 HC RX CONTRAST MEDICATION: Performed by: RADIOLOGY

## 2023-01-01 PROCEDURE — 0BH17EZ INSERTION OF ENDOTRACHEAL AIRWAY INTO TRACHEA, VIA NATURAL OR ARTIFICIAL OPENING: ICD-10-PCS | Performed by: EMERGENCY MEDICINE

## 2023-01-01 PROCEDURE — 93005 ELECTROCARDIOGRAM TRACING: CPT | Performed by: STUDENT IN AN ORGANIZED HEALTH CARE EDUCATION/TRAINING PROGRAM

## 2023-01-01 PROCEDURE — 36600 WITHDRAWAL OF ARTERIAL BLOOD: CPT

## 2023-01-01 PROCEDURE — 78606 BRAIN IMAGE W/FLOW 4 + VIEWS: CPT

## 2023-01-01 PROCEDURE — 99233 SBSQ HOSP IP/OBS HIGH 50: CPT | Performed by: PSYCHIATRY & NEUROLOGY

## 2023-01-01 PROCEDURE — 82803 BLOOD GASES ANY COMBINATION: CPT

## 2023-01-01 PROCEDURE — 95706 EEG WO VID 2-12HR INTMT MNTR: CPT

## 2023-01-01 PROCEDURE — 94003 VENT MGMT INPAT SUBQ DAY: CPT

## 2023-01-01 PROCEDURE — 96374 THER/PROPH/DIAG INJ IV PUSH: CPT

## 2023-01-01 PROCEDURE — 87070 CULTURE OTHR SPECIMN AEROBIC: CPT

## 2023-01-01 RX ORDER — SODIUM CHLORIDE, SODIUM LACTATE, POTASSIUM CHLORIDE, AND CALCIUM CHLORIDE .6; .31; .03; .02 G/100ML; G/100ML; G/100ML; G/100ML
1000 INJECTION, SOLUTION INTRAVENOUS ONCE
Status: COMPLETED | OUTPATIENT
Start: 2023-01-01 | End: 2023-01-01

## 2023-01-01 RX ORDER — PROPOFOL 10 MG/ML
INJECTION, EMULSION INTRAVENOUS
Status: DISCONTINUED
Start: 2023-01-01 | End: 2023-01-01

## 2023-01-01 RX ORDER — SODIUM CHLORIDE 0.9 % (FLUSH) 0.9 %
5-40 SYRINGE (ML) INJECTION 2 TIMES DAILY
Status: DISCONTINUED | OUTPATIENT
Start: 2023-01-01 | End: 2023-05-23 | Stop reason: HOSPADM

## 2023-01-01 RX ORDER — INSULIN LISPRO 100 [IU]/ML
0-4 INJECTION, SOLUTION INTRAVENOUS; SUBCUTANEOUS NIGHTLY
Status: DISCONTINUED | OUTPATIENT
Start: 2023-01-01 | End: 2023-05-23 | Stop reason: HOSPADM

## 2023-01-01 RX ORDER — CYCLOBENZAPRINE HCL 5 MG
5 TABLET ORAL DAILY PRN
COMMUNITY

## 2023-01-01 RX ORDER — DEXAMETHASONE SODIUM PHOSPHATE 10 MG/ML
10 INJECTION, SOLUTION INTRAMUSCULAR; INTRAVENOUS EVERY 6 HOURS
Status: DISCONTINUED | OUTPATIENT
Start: 2023-01-01 | End: 2023-05-23 | Stop reason: HOSPADM

## 2023-01-01 RX ORDER — METRONIDAZOLE 500 MG/100ML
500 INJECTION, SOLUTION INTRAVENOUS EVERY 8 HOURS
Status: DISCONTINUED | OUTPATIENT
Start: 2023-01-01 | End: 2023-05-23 | Stop reason: HOSPADM

## 2023-01-01 RX ORDER — KETOROLAC TROMETHAMINE 10 MG/1
10 TABLET, FILM COATED ORAL EVERY 6 HOURS PRN
COMMUNITY

## 2023-01-01 RX ORDER — SODIUM CHLORIDE AND POTASSIUM CHLORIDE 300; 900 MG/100ML; MG/100ML
INJECTION, SOLUTION INTRAVENOUS CONTINUOUS
Status: DISCONTINUED | OUTPATIENT
Start: 2023-01-01 | End: 2023-01-01

## 2023-01-01 RX ORDER — TRAMADOL HYDROCHLORIDE 50 MG/1
50 TABLET ORAL EVERY 6 HOURS PRN
COMMUNITY

## 2023-01-01 RX ORDER — SENNA AND DOCUSATE SODIUM 50; 8.6 MG/1; MG/1
2 TABLET, FILM COATED ORAL DAILY PRN
Status: DISCONTINUED | OUTPATIENT
Start: 2023-01-01 | End: 2023-05-23 | Stop reason: HOSPADM

## 2023-01-01 RX ORDER — LEVETIRACETAM 500 MG/5ML
1000 INJECTION, SOLUTION, CONCENTRATE INTRAVENOUS EVERY 12 HOURS
Status: DISCONTINUED | OUTPATIENT
Start: 2023-01-01 | End: 2023-05-23 | Stop reason: HOSPADM

## 2023-01-01 RX ORDER — PROPOFOL 10 MG/ML
INJECTION, EMULSION INTRAVENOUS
Status: COMPLETED
Start: 2023-01-01 | End: 2023-01-01

## 2023-01-01 RX ORDER — DESMOPRESSIN ACETATE 4 UG/ML
1 INJECTION, SOLUTION INTRAVENOUS; SUBCUTANEOUS ONCE
Status: COMPLETED | OUTPATIENT
Start: 2023-01-01 | End: 2023-01-01

## 2023-01-01 RX ORDER — ONDANSETRON 4 MG/1
4 TABLET, ORALLY DISINTEGRATING ORAL EVERY 8 HOURS PRN
COMMUNITY

## 2023-01-01 RX ORDER — DEXAMETHASONE SODIUM PHOSPHATE 10 MG/ML
10 INJECTION, SOLUTION INTRAMUSCULAR; INTRAVENOUS ONCE
OUTPATIENT
Start: 2023-01-01

## 2023-01-01 RX ORDER — POTASSIUM CHLORIDE 29.8 MG/ML
20 INJECTION INTRAVENOUS
Status: COMPLETED | OUTPATIENT
Start: 2023-01-01 | End: 2023-01-01

## 2023-01-01 RX ORDER — NOREPINEPHRINE BIT/0.9 % NACL 16MG/250ML
1-100 INFUSION BOTTLE (ML) INTRAVENOUS CONTINUOUS
Status: DISCONTINUED | OUTPATIENT
Start: 2023-01-01 | End: 2023-01-01

## 2023-01-01 RX ORDER — DEXTROSE MONOHYDRATE 100 MG/ML
INJECTION, SOLUTION INTRAVENOUS CONTINUOUS PRN
Status: DISCONTINUED | OUTPATIENT
Start: 2023-01-01 | End: 2023-05-23 | Stop reason: HOSPADM

## 2023-01-01 RX ORDER — PROPOFOL 10 MG/ML
5-50 INJECTION, EMULSION INTRAVENOUS ONCE
Status: COMPLETED | OUTPATIENT
Start: 2023-01-01 | End: 2023-01-01

## 2023-01-01 RX ORDER — PREDNISONE 20 MG/1
20 TABLET ORAL DAILY
COMMUNITY

## 2023-01-01 RX ORDER — CHLORHEXIDINE GLUCONATE 0.12 MG/ML
15 RINSE ORAL 2 TIMES DAILY
Status: DISCONTINUED | OUTPATIENT
Start: 2023-01-01 | End: 2023-05-23 | Stop reason: HOSPADM

## 2023-01-01 RX ORDER — 0.9 % SODIUM CHLORIDE 0.9 %
1000 INTRAVENOUS SOLUTION INTRAVENOUS ONCE
Status: COMPLETED | OUTPATIENT
Start: 2023-01-01 | End: 2023-01-01

## 2023-01-01 RX ORDER — INSULIN LISPRO 100 [IU]/ML
0-8 INJECTION, SOLUTION INTRAVENOUS; SUBCUTANEOUS EVERY 6 HOURS
Status: DISCONTINUED | OUTPATIENT
Start: 2023-01-01 | End: 2023-05-23 | Stop reason: HOSPADM

## 2023-01-01 RX ORDER — DEXTROSE MONOHYDRATE 50 MG/ML
INJECTION, SOLUTION INTRAVENOUS CONTINUOUS
Status: DISCONTINUED | OUTPATIENT
Start: 2023-01-01 | End: 2023-05-23

## 2023-01-01 RX ORDER — INSULIN LISPRO 100 [IU]/ML
12 INJECTION, SOLUTION INTRAVENOUS; SUBCUTANEOUS ONCE
Status: COMPLETED | OUTPATIENT
Start: 2023-01-01 | End: 2023-01-01

## 2023-01-01 RX ORDER — OXYCODONE AND ACETAMINOPHEN 10; 325 MG/1; MG/1
1 TABLET ORAL EVERY 6 HOURS PRN
COMMUNITY

## 2023-01-01 RX ADMIN — GADOTERIDOL 13 ML: 279.3 INJECTION, SOLUTION INTRAVENOUS at 10:07

## 2023-01-01 RX ADMIN — DEXTROSE MONOHYDRATE: 50 INJECTION, SOLUTION INTRAVENOUS at 04:54

## 2023-01-01 RX ADMIN — CHLORHEXIDINE GLUCONATE 15 ML: 1.2 RINSE ORAL at 20:21

## 2023-01-01 RX ADMIN — DEXAMETHASONE SODIUM PHOSPHATE 10 MG: 10 INJECTION INTRAMUSCULAR; INTRAVENOUS at 22:38

## 2023-01-01 RX ADMIN — DEXTROSE MONOHYDRATE: 50 INJECTION, SOLUTION INTRAVENOUS at 20:23

## 2023-01-01 RX ADMIN — DEXTROSE MONOHYDRATE: 50 INJECTION, SOLUTION INTRAVENOUS at 10:00

## 2023-01-01 RX ADMIN — METRONIDAZOLE 500 MG: 500 INJECTION, SOLUTION INTRAVENOUS at 18:05

## 2023-01-01 RX ADMIN — METRONIDAZOLE 500 MG: 500 INJECTION, SOLUTION INTRAVENOUS at 17:52

## 2023-01-01 RX ADMIN — METRONIDAZOLE 500 MG: 500 INJECTION, SOLUTION INTRAVENOUS at 01:35

## 2023-01-01 RX ADMIN — SODIUM CHLORIDE, PRESERVATIVE FREE 10 ML: 5 INJECTION INTRAVENOUS at 20:21

## 2023-01-01 RX ADMIN — AMPICILLIN SODIUM 2000 MG: 2 INJECTION, POWDER, FOR SOLUTION INTRAVENOUS at 07:57

## 2023-01-01 RX ADMIN — PHENYLEPHRINE HYDROCHLORIDE 30 MCG/MIN: 10 INJECTION INTRAVENOUS at 12:11

## 2023-01-01 RX ADMIN — FAMOTIDINE 20 MG: 10 INJECTION, SOLUTION INTRAVENOUS at 18:31

## 2023-01-01 RX ADMIN — METRONIDAZOLE 500 MG: 500 INJECTION, SOLUTION INTRAVENOUS at 01:50

## 2023-01-01 RX ADMIN — SODIUM CHLORIDE, POTASSIUM CHLORIDE, SODIUM LACTATE AND CALCIUM CHLORIDE 1000 ML: 600; 310; 30; 20 INJECTION, SOLUTION INTRAVENOUS at 00:23

## 2023-01-01 RX ADMIN — AMPICILLIN SODIUM 2000 MG: 2 INJECTION, POWDER, FOR SOLUTION INTRAVENOUS at 12:14

## 2023-01-01 RX ADMIN — CHLORHEXIDINE GLUCONATE 15 ML: 1.2 RINSE ORAL at 07:56

## 2023-01-01 RX ADMIN — SODIUM CHLORIDE 1000 ML: 9 INJECTION, SOLUTION INTRAVENOUS at 16:02

## 2023-01-01 RX ADMIN — AMPICILLIN SODIUM 2000 MG: 2 INJECTION, POWDER, FOR SOLUTION INTRAVENOUS at 20:48

## 2023-01-01 RX ADMIN — METRONIDAZOLE 500 MG: 500 INJECTION, SOLUTION INTRAVENOUS at 09:30

## 2023-01-01 RX ADMIN — PROPOFOL 20 MCG/KG/MIN: 10 INJECTION, EMULSION INTRAVENOUS at 13:58

## 2023-01-01 RX ADMIN — Medication 6 UNITS: at 12:19

## 2023-01-01 RX ADMIN — AMPICILLIN SODIUM 2000 MG: 2 INJECTION, POWDER, FOR SOLUTION INTRAVENOUS at 08:20

## 2023-01-01 RX ADMIN — AMPICILLIN SODIUM 2000 MG: 2 INJECTION, POWDER, FOR SOLUTION INTRAVENOUS at 00:01

## 2023-01-01 RX ADMIN — METRONIDAZOLE 500 MG: 500 INJECTION, SOLUTION INTRAVENOUS at 18:32

## 2023-01-01 RX ADMIN — POTASSIUM CHLORIDE AND SODIUM CHLORIDE: 900; 300 INJECTION, SOLUTION INTRAVENOUS at 18:30

## 2023-01-01 RX ADMIN — DESMOPRESSIN ACETATE 1 MCG: 4 SOLUTION INTRAVENOUS at 12:05

## 2023-01-01 RX ADMIN — ACYCLOVIR SODIUM 700 MG: 50 INJECTION, SOLUTION INTRAVENOUS at 15:49

## 2023-01-01 RX ADMIN — AMPICILLIN SODIUM 2000 MG: 2 INJECTION, POWDER, FOR SOLUTION INTRAVENOUS at 16:07

## 2023-01-01 RX ADMIN — DEXTROSE MONOHYDRATE: 50 INJECTION, SOLUTION INTRAVENOUS at 14:50

## 2023-01-01 RX ADMIN — FAMOTIDINE 20 MG: 10 INJECTION, SOLUTION INTRAVENOUS at 07:56

## 2023-01-01 RX ADMIN — AMPICILLIN SODIUM 2000 MG: 2 INJECTION, POWDER, FOR SOLUTION INTRAMUSCULAR; INTRAVENOUS at 15:42

## 2023-01-01 RX ADMIN — AMPICILLIN SODIUM 2000 MG: 2 INJECTION, POWDER, FOR SOLUTION INTRAVENOUS at 12:06

## 2023-01-01 RX ADMIN — METRONIDAZOLE 500 MG: 500 INJECTION, SOLUTION INTRAVENOUS at 10:48

## 2023-01-01 RX ADMIN — Medication 2 UNITS: at 05:48

## 2023-01-01 RX ADMIN — LEVETIRACETAM 1000 MG: 100 INJECTION, SOLUTION INTRAVENOUS at 17:20

## 2023-01-01 RX ADMIN — DEXAMETHASONE SODIUM PHOSPHATE 10 MG: 10 INJECTION INTRAMUSCULAR; INTRAVENOUS at 22:00

## 2023-01-01 RX ADMIN — LEVETIRACETAM 1000 MG: 100 INJECTION, SOLUTION INTRAVENOUS at 05:38

## 2023-01-01 RX ADMIN — SODIUM CHLORIDE, PRESERVATIVE FREE 10 ML: 5 INJECTION INTRAVENOUS at 20:39

## 2023-01-01 RX ADMIN — VANCOMYCIN HYDROCHLORIDE 1250 MG: 1.25 INJECTION, POWDER, LYOPHILIZED, FOR SOLUTION INTRAVENOUS at 16:25

## 2023-01-01 RX ADMIN — IOPAMIDOL 80 ML: 755 INJECTION, SOLUTION INTRAVENOUS at 14:02

## 2023-01-01 RX ADMIN — IOPAMIDOL 40 ML: 755 INJECTION, SOLUTION INTRAVENOUS at 14:01

## 2023-01-01 RX ADMIN — POTASSIUM CHLORIDE 20 MEQ: 29.8 INJECTION, SOLUTION INTRAVENOUS at 05:45

## 2023-01-01 RX ADMIN — AMPICILLIN SODIUM 2000 MG: 2 INJECTION, POWDER, FOR SOLUTION INTRAVENOUS at 16:22

## 2023-01-01 RX ADMIN — AMPICILLIN SODIUM 2000 MG: 2 INJECTION, POWDER, FOR SOLUTION INTRAVENOUS at 04:02

## 2023-01-01 RX ADMIN — AMPICILLIN SODIUM 2000 MG: 2 INJECTION, POWDER, FOR SOLUTION INTRAVENOUS at 03:35

## 2023-01-01 RX ADMIN — DEXAMETHASONE SODIUM PHOSPHATE 10 MG: 10 INJECTION INTRAMUSCULAR; INTRAVENOUS at 03:36

## 2023-01-01 RX ADMIN — FAMOTIDINE 20 MG: 10 INJECTION, SOLUTION INTRAVENOUS at 08:20

## 2023-01-01 RX ADMIN — NOREPINEPHRINE BITARTRATE 5 MCG/MIN: 1 SOLUTION INTRAVENOUS at 21:17

## 2023-01-01 RX ADMIN — PHENYLEPHRINE HYDROCHLORIDE 40 MCG/MIN: 10 INJECTION INTRAVENOUS at 06:03

## 2023-01-01 RX ADMIN — Medication 5 UNITS: at 13:52

## 2023-01-01 RX ADMIN — KIT FOR THE PREPARATION OF TECHNETIUM TC 99M PENTETATE 20.6 MILLICURIE: 20 INJECTION, POWDER, LYOPHILIZED, FOR SOLUTION INTRAVENOUS; RESPIRATORY (INHALATION) at 10:15

## 2023-01-01 RX ADMIN — AMPICILLIN SODIUM 2000 MG: 2 INJECTION, POWDER, FOR SOLUTION INTRAVENOUS at 20:27

## 2023-01-01 RX ADMIN — CEFTRIAXONE SODIUM 2000 MG: 2 INJECTION, POWDER, FOR SOLUTION INTRAMUSCULAR; INTRAVENOUS at 15:54

## 2023-01-01 RX ADMIN — CEFTRIAXONE SODIUM 2000 MG: 2 INJECTION, POWDER, FOR SOLUTION INTRAMUSCULAR; INTRAVENOUS at 16:07

## 2023-01-01 RX ADMIN — POTASSIUM CHLORIDE AND SODIUM CHLORIDE: 900; 300 INJECTION, SOLUTION INTRAVENOUS at 23:56

## 2023-01-01 RX ADMIN — CEFTRIAXONE SODIUM 2000 MG: 2 INJECTION, POWDER, FOR SOLUTION INTRAMUSCULAR; INTRAVENOUS at 16:18

## 2023-01-01 RX ADMIN — DEXAMETHASONE SODIUM PHOSPHATE 10 MG: 10 INJECTION INTRAMUSCULAR; INTRAVENOUS at 21:18

## 2023-01-01 RX ADMIN — SODIUM CHLORIDE, PRESERVATIVE FREE 10 ML: 5 INJECTION INTRAVENOUS at 10:45

## 2023-01-01 RX ADMIN — Medication 5 UNITS: at 20:38

## 2023-01-01 RX ADMIN — VANCOMYCIN HYDROCHLORIDE 1250 MG: 1.25 INJECTION, POWDER, LYOPHILIZED, FOR SOLUTION INTRAVENOUS at 03:37

## 2023-01-01 RX ADMIN — DEXAMETHASONE SODIUM PHOSPHATE 10 MG: 10 INJECTION INTRAMUSCULAR; INTRAVENOUS at 10:48

## 2023-01-01 RX ADMIN — VANCOMYCIN HYDROCHLORIDE 1750 MG: 10 INJECTION, POWDER, LYOPHILIZED, FOR SOLUTION INTRAVENOUS at 15:38

## 2023-01-01 RX ADMIN — CHLORHEXIDINE GLUCONATE 15 ML: 1.2 RINSE ORAL at 20:24

## 2023-01-01 RX ADMIN — DEXAMETHASONE SODIUM PHOSPHATE 10 MG: 10 INJECTION INTRAMUSCULAR; INTRAVENOUS at 09:27

## 2023-01-01 RX ADMIN — AMPICILLIN SODIUM 2000 MG: 2 INJECTION, POWDER, FOR SOLUTION INTRAVENOUS at 20:20

## 2023-01-01 RX ADMIN — CEFTRIAXONE SODIUM 2000 MG: 2 INJECTION, POWDER, FOR SOLUTION INTRAMUSCULAR; INTRAVENOUS at 04:01

## 2023-01-01 RX ADMIN — CHLORHEXIDINE GLUCONATE 15 ML: 1.2 RINSE ORAL at 21:19

## 2023-01-01 RX ADMIN — LEVETIRACETAM 1000 MG: 100 INJECTION, SOLUTION INTRAVENOUS at 16:48

## 2023-01-01 RX ADMIN — PHENYLEPHRINE HYDROCHLORIDE 30 MCG/MIN: 10 INJECTION INTRAVENOUS at 21:59

## 2023-01-01 RX ADMIN — DEXAMETHASONE SODIUM PHOSPHATE 10 MG: 10 INJECTION INTRAMUSCULAR; INTRAVENOUS at 04:01

## 2023-01-01 RX ADMIN — DEXAMETHASONE SODIUM PHOSPHATE 10 MG: 10 INJECTION INTRAMUSCULAR; INTRAVENOUS at 16:17

## 2023-01-01 RX ADMIN — POTASSIUM CHLORIDE AND SODIUM CHLORIDE: 900; 300 INJECTION, SOLUTION INTRAVENOUS at 08:10

## 2023-01-01 RX ADMIN — SODIUM CHLORIDE, PRESERVATIVE FREE 10 ML: 5 INJECTION INTRAVENOUS at 08:21

## 2023-01-01 RX ADMIN — CEFTRIAXONE SODIUM 2000 MG: 2 INJECTION, POWDER, FOR SOLUTION INTRAMUSCULAR; INTRAVENOUS at 03:36

## 2023-01-01 RX ADMIN — LEVETIRACETAM 1000 MG: 100 INJECTION, SOLUTION INTRAVENOUS at 04:53

## 2023-01-01 RX ADMIN — DEXAMETHASONE SODIUM PHOSPHATE 10 MG: 10 INJECTION INTRAMUSCULAR; INTRAVENOUS at 16:09

## 2023-01-01 RX ADMIN — Medication 12 UNITS: at 17:51

## 2023-01-01 RX ADMIN — AMPICILLIN SODIUM 2000 MG: 2 INJECTION, POWDER, FOR SOLUTION INTRAVENOUS at 00:22

## 2023-01-01 RX ADMIN — Medication 4 UNITS: at 20:38

## 2023-01-01 RX ADMIN — POTASSIUM CHLORIDE 20 MEQ: 29.8 INJECTION, SOLUTION INTRAVENOUS at 08:20

## 2023-01-01 RX ADMIN — LEVETIRACETAM 1000 MG: 100 INJECTION, SOLUTION INTRAVENOUS at 17:16

## 2023-01-01 RX ADMIN — VANCOMYCIN HYDROCHLORIDE 1250 MG: 1.25 INJECTION, POWDER, LYOPHILIZED, FOR SOLUTION INTRAVENOUS at 04:01

## 2023-01-01 RX ADMIN — CHLORHEXIDINE GLUCONATE 15 ML: 1.2 RINSE ORAL at 08:21

## 2023-01-01 ASSESSMENT — PULMONARY FUNCTION TESTS
PIF_VALUE: 19
PIF_VALUE: 20
PIF_VALUE: 16
PIF_VALUE: 17
PIF_VALUE: 20
PIF_VALUE: 19
PIF_VALUE: 20
PIF_VALUE: 20
PIF_VALUE: 19
PIF_VALUE: 18
PIF_VALUE: 17
PIF_VALUE: 16
PIF_VALUE: 16

## 2023-01-01 ASSESSMENT — PAIN SCALES - GENERAL
PAINLEVEL_OUTOF10: 0

## 2023-01-03 ENCOUNTER — TELEPHONE (OUTPATIENT)
Dept: PRIMARY CARE CLINIC | Age: 48
End: 2023-01-03

## 2023-01-12 ENCOUNTER — OFFICE VISIT (OUTPATIENT)
Dept: PRIMARY CARE CLINIC | Age: 48
End: 2023-01-12
Payer: MEDICAID

## 2023-01-12 VITALS
SYSTOLIC BLOOD PRESSURE: 135 MMHG | RESPIRATION RATE: 16 BRPM | WEIGHT: 142.2 LBS | DIASTOLIC BLOOD PRESSURE: 92 MMHG | HEIGHT: 60 IN | BODY MASS INDEX: 27.92 KG/M2 | TEMPERATURE: 98.2 F | HEART RATE: 66 BPM | OXYGEN SATURATION: 100 %

## 2023-01-12 DIAGNOSIS — Z00.00 ROUTINE GENERAL MEDICAL EXAMINATION AT A HEALTH CARE FACILITY: ICD-10-CM

## 2023-01-12 DIAGNOSIS — F32.1 CURRENT MODERATE EPISODE OF MAJOR DEPRESSIVE DISORDER WITHOUT PRIOR EPISODE (HCC): ICD-10-CM

## 2023-01-12 DIAGNOSIS — I10 ESSENTIAL HYPERTENSION: ICD-10-CM

## 2023-01-12 DIAGNOSIS — F51.01 PRIMARY INSOMNIA: Primary | ICD-10-CM

## 2023-01-12 DIAGNOSIS — Z23 ENCOUNTER FOR IMMUNIZATION: ICD-10-CM

## 2023-01-12 RX ORDER — HYDROXYZINE 25 MG/1
25 TABLET, FILM COATED ORAL
Qty: 30 TABLET | Refills: 5 | Status: SHIPPED | OUTPATIENT
Start: 2023-01-12 | End: 2023-01-22

## 2023-01-12 RX ORDER — SERTRALINE HYDROCHLORIDE 50 MG/1
50 TABLET, FILM COATED ORAL DAILY
Qty: 30 TABLET | Refills: 1 | Status: SHIPPED | OUTPATIENT
Start: 2023-01-12

## 2023-01-12 RX ORDER — SOLIFENACIN SUCCINATE 5 MG/1
5 TABLET, FILM COATED ORAL DAILY
COMMUNITY

## 2023-01-12 RX ORDER — AMLODIPINE BESYLATE 10 MG/1
10 TABLET ORAL DAILY
Qty: 90 TABLET | Refills: 0 | Status: SHIPPED | OUTPATIENT
Start: 2023-01-12

## 2023-01-12 RX ORDER — HYDROCHLOROTHIAZIDE 12.5 MG/1
12.5 TABLET ORAL DAILY
Qty: 30 TABLET | Refills: 2 | Status: SHIPPED | OUTPATIENT
Start: 2023-01-12

## 2023-01-12 RX ORDER — ACETAMINOPHEN 500 MG
TABLET ORAL
Qty: 1 KIT | Refills: 0 | Status: SHIPPED | OUTPATIENT
Start: 2023-01-12

## 2023-01-12 NOTE — PROGRESS NOTES
Chief Complaint   Patient presents with    Complete Physical    Gyn Exam     Visit Vitals  BP (!) 135/92 (BP 1 Location: Left upper arm, BP Patient Position: Sitting, BP Cuff Size: Small adult)   Pulse 66   Temp 98.2 °F (36.8 °C) (Temporal)   Resp 16   Ht 5' (1.524 m)   Wt 142 lb 3.2 oz (64.5 kg)   SpO2 100%   BMI 27.77 kg/m²     1. \"Have you been to the ER, urgent care clinic since your last visit? Hospitalized since your last visit? \" 12/15/22 and ER [facility]    2. \"Have you seen or consulted any other health care providers outside of the [address] [place] [address] since your last visit? \" Urology     3. For patients aged 39-70: Has the patient had a colonoscopy / FIT/ Cologuard? Up to date      If the patient is female:    4. For patients aged 41-77: Has the patient had a mammogram within the past 2 years?  In chart

## 2023-01-12 NOTE — PROGRESS NOTES
HPI     Chief Complaint   Patient presents with    Complete Physical    Gyn Exam     She is a 50 y.o. female who presents to establish care. Establishing Care    Pmhx : HTN, migraines, Anxiety,   Status post L5-S1 posterior laminectomy decompression with interbody fusion performed on 08/24/2022  DVT, following with heme. 12/9 ok'd discontinuing eliquis. Chronic depression and anxiety. Lost her daughter a year ago and still grieving over this. Having trouble sleeping. Appetite is low. Perimenopausal. Considering seeing  a therapist. Denies thoughts of self harm or SI. She reports colonoscopy in 2021 that was normal.       - Chronic medical problems:  Past Medical History:   Diagnosis Date    Anxiety disorder     Arthritis     Carpal tunnel syndrome on both sides 2009    Dural tear 2022    Headache     Hypertension     Migraines      - Current medications:   Current Outpatient Medications   Medication Sig    solifenacin (VESICARE) 5 mg tablet Take 5 mg by mouth daily. amLODIPine (NORVASC) 10 mg tablet Take 1 Tablet by mouth daily. hydrOXYzine HCL (ATARAX) 25 mg tablet Take 1 Tablet by mouth nightly as needed for Sleep for up to 10 days. Blood Pressure Monitor kit Monitor blood pressure daily    fluticasone propionate (FLONASE) 50 mcg/actuation nasal spray 2 Sprays by Both Nostrils route daily. omeprazole magnesium (PRILOSEC PO) Take 20 mg by mouth daily. albuterol (PROVENTIL HFA, VENTOLIN HFA, PROAIR HFA) 90 mcg/actuation inhaler Take 1 Puff by inhalation every six (6) hours as needed for Wheezing or Shortness of Breath. No current facility-administered medications for this visit.      - Family history:   Family History   Problem Relation Age of Onset    Hypertension Mother     Stroke Father     Diabetes Father     Migraines Maternal Grandmother     Headache Maternal Grandmother      - Allergies: No Known Allergies  - Surgical history:   Past Surgical History:   Procedure Laterality Date    HX LUMBAR FUSION      HX ORTHOPAEDIC  2009    bilateral CTS procedure    HX OTHER SURGICAL      tubes in ears as child     - Social history (sexually active, occupation, smoker, etoh use, etc):   Social History     Socioeconomic History    Marital status:      Spouse name: Not on file    Number of children: Not on file    Years of education: Not on file    Highest education level: Not on file   Occupational History    Not on file   Tobacco Use    Smoking status: Former     Packs/day: 0.50     Types: Cigarettes     Quit date: 10/31/2021     Years since quittin.2    Smokeless tobacco: Never    Tobacco comments:     5 cigs a day   Vaping Use    Vaping Use: Never used   Substance and Sexual Activity    Alcohol use: Not Currently     Comment: socially    Drug use: No    Sexual activity: Yes     Partners: Male     Birth control/protection: None   Other Topics Concern    Not on file   Social History Narrative    Not on file     Social Determinants of Health     Financial Resource Strain: Medium Risk    Difficulty of Paying Living Expenses: Somewhat hard   Food Insecurity: Food Insecurity Present    Worried About Running Out of Food in the Last Year: Sometimes true    Ran Out of Food in the Last Year: Sometimes true   Transportation Needs: Not on file   Physical Activity: Not on file   Stress: Not on file   Social Connections: Not on file   Intimate Partner Violence: Not on file   Housing Stability: Not on file         Review of Systems  General : Denies fever, chills, unintentional weight loss. Cardiac : Denies chest pain, shortness of breath, orthopnea, PND. Pulm : Denies coughing, hemoptysis, dyspnea. GI: Denies abd pain, vomiting, change in bowel movements, black/bloody stool. Neuro : Denies syncope or presyncope. Denies focal neuro symptoms. Reviewed PmHx, RxHx, FmHx, SocHx, AllgHx and updated and dated in the chart.     Physical Exam:  Visit Vitals  BP (!) 135/92 (BP 1 Location: Left upper arm, BP Patient Position: Sitting, BP Cuff Size: Small adult)   Pulse 66   Temp 98.2 °F (36.8 °C) (Temporal)   Resp 16   Ht 5' (1.524 m)   Wt 142 lb 3.2 oz (64.5 kg)   SpO2 100%   BMI 27.77 kg/m²     Physical Exam  Vitals reviewed. Constitutional:       Appearance: Normal appearance. HENT:      Head: Normocephalic and atraumatic. Right Ear: Tympanic membrane, ear canal and external ear normal.      Left Ear: Tympanic membrane, ear canal and external ear normal.      Nose: Nose normal.      Mouth/Throat:      Mouth: Mucous membranes are moist.      Pharynx: Oropharynx is clear. Eyes:      Conjunctiva/sclera: Conjunctivae normal.      Pupils: Pupils are equal, round, and reactive to light. Cardiovascular:      Rate and Rhythm: Normal rate and regular rhythm. Pulses: Normal pulses. Heart sounds: Normal heart sounds. Pulmonary:      Effort: Pulmonary effort is normal.      Breath sounds: Normal breath sounds. Abdominal:      General: Bowel sounds are normal. There is no distension. Palpations: Abdomen is soft. There is no mass. Tenderness: There is no abdominal tenderness. Musculoskeletal:      Cervical back: Neck supple. No tenderness. Right lower leg: No edema. Left lower leg: No edema. Lymphadenopathy:      Cervical: No cervical adenopathy. Skin:     General: Skin is warm and dry. Neurological:      General: No focal deficit present. Mental Status: She is alert and oriented to person, place, and time. Psychiatric:         Mood and Affect: Mood normal.         Behavior: Behavior normal.         Thought Content: Thought content normal.          Assessment / Plan     Diagnoses and all orders for this visit:    1. Primary insomnia  -     hydrOXYzine HCL (ATARAX) 25 mg tablet; Take 1 Tablet by mouth nightly as needed for Sleep for up to 10 days. 2. Essential hypertension  -     amLODIPine (NORVASC) 10 mg tablet; Take 1 Tablet by mouth daily.   - Blood Pressure Monitor kit; Monitor blood pressure daily  -     CBC WITH AUTOMATED DIFF; Future  -     METABOLIC PANEL, COMPREHENSIVE; Future    3. Encounter for immunization  -     INFLUENZA, FLUARIX, FLULAVAL, FLUZONE (AGE 6 MO+), AFLURIA(AGE 3Y+) IM, PF, 0.5 ML    4. Routine general medical examination at a health care facility  -     LIPID PANEL; Future  -     VITAMIN D, 25 HYDROXY; Future  -     CBC WITH AUTOMATED DIFF; Future  -     METABOLIC PANEL, COMPREHENSIVE; Future    5. Current moderate episode of major depressive disorder without prior episode (HCC)  -     VITAMIN D, 25 HYDROXY; Future  -     sertraline (ZOLOFT) 50 mg tablet; Take 1 Tablet by mouth daily. Other orders  -     hydroCHLOROthiazide (HYDRODIURIL) 12.5 mg tablet; Take 1 Tablet by mouth daily. Obtain medical records from colonoscopy. Monitor bp at home; bp goals discussed. If bp remains elevated may add hctz QAM.   Recent labs reviewed. UTD on pap smear and mammogram screenings. She declines STI testing. Recommended she see a counselor. She is agreeable to doing this. Encouraged regular exercise, healthy diet. She declines tetanus immunization. Follow up 4 weeks. Seek care if your mood worsens. I have discussed the diagnosis with the patient and the intended plan as seen in the above orders. I have discussed medication side effects and warnings with the patient as well.     Juluis Lundborg, DO

## 2023-01-13 DIAGNOSIS — I10 ESSENTIAL HYPERTENSION: ICD-10-CM

## 2023-01-13 NOTE — TELEPHONE ENCOUNTER
Patient is at the pharmacy picking up their medications but the pharmacy is reporting that they never received an order for the blood pressure monitor kit. Receipt for all five scripts (four meds, one kit) was confirmed by the pharmacy yesterday at 12:39 pm. All other meds were picked up without issue. Patient is requesting we resubmit since they are there now. PCP: Marie Thompson DO    Last appt: 1/12/2023  No future appointments.     Requested Prescriptions     Pending Prescriptions Disp Refills    Blood Pressure Monitor kit 1 Kit 0     Sig: Monitor blood pressure daily       Prior labs and Blood pressures:  BP Readings from Last 3 Encounters:   01/12/23 (!) 135/92   12/15/22 (!) 143/87   12/09/22 (!) 136/98     Lab Results   Component Value Date/Time    Sodium 137 07/28/2022 12:00 AM    Potassium 4.6 07/28/2022 12:00 AM    Chloride 103 07/28/2022 12:00 AM    CO2 25 07/28/2022 12:00 AM    Anion gap 7 08/27/2021 09:28 PM    Glucose 92 07/28/2022 12:00 AM    BUN 9 07/28/2022 12:00 AM    Creatinine 0.68 07/28/2022 12:00 AM    BUN/Creatinine ratio 13 07/28/2022 12:00 AM    GFR est AA >60 08/27/2021 09:28 PM    GFR est non-AA >60 08/27/2021 09:28 PM    Calcium 10.1 07/28/2022 12:00 AM

## 2023-01-16 RX ORDER — ACETAMINOPHEN 500 MG
TABLET ORAL
Qty: 1 KIT | Refills: 0 | Status: SHIPPED | OUTPATIENT
Start: 2023-01-16

## 2023-03-11 DIAGNOSIS — F32.1 CURRENT MODERATE EPISODE OF MAJOR DEPRESSIVE DISORDER WITHOUT PRIOR EPISODE (HCC): ICD-10-CM

## 2023-03-12 RX ORDER — SERTRALINE HYDROCHLORIDE 50 MG/1
TABLET, FILM COATED ORAL
Qty: 30 TABLET | Refills: 1 | Status: SHIPPED | OUTPATIENT
Start: 2023-03-12

## 2023-05-12 DIAGNOSIS — F51.01 PRIMARY INSOMNIA: Primary | ICD-10-CM

## 2023-05-12 RX ORDER — HYDROXYZINE HYDROCHLORIDE 25 MG/1
TABLET, FILM COATED ORAL
COMMUNITY
Start: 2023-04-10 | End: 2023-05-12 | Stop reason: SDUPTHER

## 2023-05-12 RX ORDER — HYDROXYZINE HYDROCHLORIDE 25 MG/1
TABLET, FILM COATED ORAL
Qty: 30 TABLET | Refills: 1 | Status: SHIPPED | OUTPATIENT
Start: 2023-05-12

## 2023-05-12 RX ORDER — HYDROCHLOROTHIAZIDE 12.5 MG/1
TABLET ORAL
Qty: 30 TABLET | Refills: 0 | Status: SHIPPED | OUTPATIENT
Start: 2023-05-12

## 2023-05-18 ENCOUNTER — TRANSCRIBE ORDERS (OUTPATIENT)
Facility: HOSPITAL | Age: 48
End: 2023-05-18

## 2023-05-18 DIAGNOSIS — M54.50 CHRONIC BILATERAL LOW BACK PAIN WITHOUT SCIATICA: ICD-10-CM

## 2023-05-18 DIAGNOSIS — Z51.89 AFTER CARE: Primary | ICD-10-CM

## 2023-05-18 DIAGNOSIS — G89.29 CHRONIC BILATERAL LOW BACK PAIN WITHOUT SCIATICA: ICD-10-CM

## 2023-05-19 ENCOUNTER — HOSPITAL ENCOUNTER (OUTPATIENT)
Age: 48
End: 2023-05-19
Payer: MEDICARE

## 2023-05-19 DIAGNOSIS — M54.50 CHRONIC BILATERAL LOW BACK PAIN WITHOUT SCIATICA: ICD-10-CM

## 2023-05-19 DIAGNOSIS — Z51.89 AFTER CARE: ICD-10-CM

## 2023-05-19 DIAGNOSIS — G89.29 CHRONIC BILATERAL LOW BACK PAIN WITHOUT SCIATICA: ICD-10-CM

## 2023-05-19 PROCEDURE — 6360000004 HC RX CONTRAST MEDICATION: Performed by: RADIOLOGY

## 2023-05-19 PROCEDURE — A9579 GAD-BASE MR CONTRAST NOS,1ML: HCPCS | Performed by: RADIOLOGY

## 2023-05-19 PROCEDURE — 72158 MRI LUMBAR SPINE W/O & W/DYE: CPT

## 2023-05-19 RX ADMIN — GADOTERIDOL 13 ML: 279.3 INJECTION, SOLUTION INTRAVENOUS at 14:29

## 2023-05-20 PROBLEM — G00.9 BACTERIAL MENINGITIS: Status: ACTIVE | Noted: 2023-01-01

## 2023-05-21 PROBLEM — R65.20 SEVERE SEPSIS (HCC): Status: ACTIVE | Noted: 2023-01-01

## 2023-05-21 PROBLEM — A41.9 SEVERE SEPSIS (HCC): Status: ACTIVE | Noted: 2023-05-21

## 2023-05-21 PROBLEM — G93.9 BRAIN DAMAGE: Status: ACTIVE | Noted: 2023-01-01

## 2023-05-22 NOTE — DEATH NOTES
Pt Name  Francisco Smalls date:  5/20/2023   Date and time of death:  05/22/23 @ 1051 Declared Brain Death by Nuclear perfusion scan. Room Number  7102/01    Medical Record Number  [id number] @ [facility]   Age  46 y.o. Date of Birth 1975   PCP Kashmir Alcocer DO   Attending physician Tex Root MD        Code Status  DNR    Patient seen and examined     Mental status   Unresponsive    Pupils Dilated and Fixed    Respiration Nil    Pulse  Absent: 05/23/23 at 1146   Heart Sounds  Absent: 05/23/23 at 1146   Rhythm  Flat line:  05/23/23 at 1146   Family  Notified by Nursing staff    Chaplan Service  Notified by Nursing staff     Death certificate and discharge summary completion remain  Dr. Tex Root MD's responsibility.                                  5/23/2023     Nazario Hollingsworth NP-C    Critical Care Medicine  [facility]

## 2023-05-22 NOTE — CARE COORDINATION
Care Management Initial Assessment       RUR: 14%  Readmission? No     05/22/23 6803   Service Assessment   Patient Orientation Unable to Assess  (Patient is unresponsive, intubated)   Cognition   (Unresponsive)   History Provided By Medical Record   Primary Caregiver Self   Support Systems Children  (Daughter Marcio Benites [phone])   [facility] is: Legal Next of [employer]   PCP Verified by CM   (Dr Udell Sicard)   Prior Functional Level Independent in ADLs/IADLs   Current Functional Level Other (see comment)   Can patient return to prior living arrangement No   Ability to make needs known: Unable   Would you like for me to discuss the discharge plan with any other family members/significant others, and if so, who? Yes  (Dasia Benites)   Financial Resources Other (Comment)   Community Resources None     Patient presented to the ED with left facial droop, left sided weakness and was non verbal. LP: Bacterial Meningitis. Per notes Neurological examination and cerebral blood flow scan is consistent with brain death. Care manager is available as needed.  Ksenia Scott RN,Care Management

## 2023-05-22 NOTE — PROCEDURES
[address]   EEG    Name:  Romel Sun  MR#:  [id number]  :  1975  ACCOUNT #:  [de-identified]  DATE OF SERVICE:  2023      REQUESTING PHYSICIAN:  Melva Davis MD    HISTORY:  The patient is a 23-year-old female who is being evaluated for unresponsiveness and suspected severe cortical brain injury. DESCRIPTION:  This is an 18-channel EEG performed on an intubated and unresponsive patient. There is no background rhythm noted. No background activity over 1-2 mcV was seen throughout the recording. Electrical interference artifact was seen intermittently. EKG artifact was also noted frequently. Stimulation of the patient did not produce any reactivity. EEG SUMMARY:  Markedly abnormal EEG due to severe loss of cortical voltage. CLINICAL INTERPRETATION:  This EEG is suggestive of a severe cortical brain injury with essentially no electrocerebral rhythms seen. This type of an EEG pattern is overall suggestive of a poor prognosis.       Jose Rivera MD      [id number]  D:  2023 14:15  T:  2023 16:53  JOB #:  [id number]

## 2023-05-22 NOTE — DISCHARGE SUMMARY
[facility]                                                                                         Discharge Summary     Patient: Jael Churchill       MRN: [id number]       YOB: 1975       Age: 46 y.o. Date of admission:  5/20/2023    Date of discharge:  5/22/2023    Primary care provider:  Pastor Suzi DO     Admitting provider:  Jelly Sinclair MD    Discharging provider(s): Terri Plummer, [facility] - Staff [address]     Consultations  IP CONSULT TO NEUROSURGERY  IP CONSULT TO NEUROLOGY  IP CONSULT TO PHARMACY  IP CONSULT TO INFECTIOUS DISEASES    Procedures  05/20 - Intubation  05/21 - Central line placement  05/21 - Arterial line placement    Discharge destination: [place]. Admission diagnosis  Bacterial meningitis [G00.9]  Acute respiratory failure with hypoxia (HCC) [J96.01]  Severe sepsis ([address] [A41.9, R65.20]    Please refer to the admission history and physical for details on the presenting problem. Final discharge diagnoses and brief hospital course    Brain Death confirmed with nuclear medicine brain scan with vascular flow. Acute Staphylococcus Aureus Bacterial Leptomeningitis with findings suggestive of associated diffuse  Ventriculitis on MRI. Staphylococcus Aureus Bacteremia   Hypotension secondary to Sepsis from the above   Respiratory Failure, intubated due neurological decline  Electrolyte derangements  Lactic acidosis   Small fluid collection in the laminectomy bed seen on MRI lumbar spine    Brief patient summary (admission date 05/20):  Jael Churchill is a 47 yo female smoker with history of lumbar spine fusion 08/2022 who had protracted recovery, but in past couple of months has been improving until 4 days prior to admission when she developed excruciating lower back pain. Family also reports that she has had intermittent nausea, subjective fevers, sweats, and HA.  She has been seen at an outside hospital ED 4 times in the past 4

## 2023-05-23 ENCOUNTER — APPOINTMENT (OUTPATIENT)
Facility: HOSPITAL | Age: 48
DRG: 720 | End: 2023-05-23
Payer: MEDICAID

## 2023-05-23 VITALS
BODY MASS INDEX: 27.7 KG/M2 | WEIGHT: 141.09 LBS | SYSTOLIC BLOOD PRESSURE: 111 MMHG | TEMPERATURE: 99 F | OXYGEN SATURATION: 82 % | HEIGHT: 60 IN | DIASTOLIC BLOOD PRESSURE: 75 MMHG

## 2023-05-23 DIAGNOSIS — R09.81 NASAL CONGESTION: ICD-10-CM

## 2023-05-23 DIAGNOSIS — R09.82 POSTNASAL DRIP: ICD-10-CM

## 2023-05-23 LAB
ALBUMIN SERPL-MCNC: 1.5 G/DL (ref 3.5–5)
ALBUMIN/GLOB SERPL: 0.4 (ref 1.1–2.2)
ALP SERPL-CCNC: 140 U/L (ref 45–117)
ALT SERPL-CCNC: 38 U/L (ref 12–78)
ANION GAP SERPL CALC-SCNC: 6 MMOL/L (ref 5–15)
AST SERPL-CCNC: 8 U/L (ref 15–37)
BACTERIA SPEC CULT: ABNORMAL
BASOPHILS # BLD: 0 K/UL (ref 0–0.1)
BASOPHILS NFR BLD: 0 % (ref 0–1)
BILIRUB SERPL-MCNC: 0.2 MG/DL (ref 0.2–1)
BUN SERPL-MCNC: 18 MG/DL (ref 6–20)
BUN/CREAT SERPL: 19 (ref 12–20)
CALCIUM SERPL-MCNC: 8.4 MG/DL (ref 8.5–10.1)
CHLORIDE SERPL-SCNC: 120 MMOL/L (ref 97–108)
CO2 SERPL-SCNC: 20 MMOL/L (ref 21–32)
CREAT SERPL-MCNC: 0.97 MG/DL (ref 0.55–1.02)
DIFFERENTIAL METHOD BLD: ABNORMAL
EOSINOPHIL # BLD: 0 K/UL (ref 0–0.4)
EOSINOPHIL NFR BLD: 0 % (ref 0–7)
ERYTHROCYTE [DISTWIDTH] IN BLOOD BY AUTOMATED COUNT: 16 % (ref 11.5–14.5)
GLOBULIN SER CALC-MCNC: 3.9 G/DL (ref 2–4)
GLUCOSE BLD STRIP.AUTO-MCNC: 355 MG/DL (ref 65–117)
GLUCOSE BLD STRIP.AUTO-MCNC: 363 MG/DL (ref 65–117)
GLUCOSE SERPL-MCNC: 393 MG/DL (ref 65–100)
HCT VFR BLD AUTO: 30.7 % (ref 35–47)
HGB BLD-MCNC: 9.7 G/DL (ref 11.5–16)
IMM GRANULOCYTES # BLD AUTO: 0 K/UL
IMM GRANULOCYTES NFR BLD AUTO: 0 %
LYMPHOCYTES # BLD: 1.2 K/UL (ref 0.8–3.5)
LYMPHOCYTES NFR BLD: 6 % (ref 12–49)
MCH RBC QN AUTO: 29.6 PG (ref 26–34)
MCHC RBC AUTO-ENTMCNC: 31.6 G/DL (ref 30–36.5)
MCV RBC AUTO: 93.6 FL (ref 80–99)
MONOCYTES # BLD: 0.6 K/UL (ref 0–1)
MONOCYTES NFR BLD: 3 % (ref 5–13)
MYELOCYTES NFR BLD MANUAL: 1 %
NEUTS SEG # BLD: 18.6 K/UL (ref 1.8–8)
NEUTS SEG NFR BLD: 90 % (ref 32–75)
NRBC # BLD: 0 K/UL (ref 0–0.01)
NRBC BLD-RTO: 0 PER 100 WBC
PLATELET # BLD AUTO: 140 K/UL (ref 150–400)
PMV BLD AUTO: 10.8 FL (ref 8.9–12.9)
POTASSIUM SERPL-SCNC: 3.7 MMOL/L (ref 3.5–5.1)
PROCALCITONIN SERPL-MCNC: 2.08 NG/ML
PROT SERPL-MCNC: 5.4 G/DL (ref 6.4–8.2)
RBC # BLD AUTO: 3.28 M/UL (ref 3.8–5.2)
RBC MORPH BLD: ABNORMAL
SERVICE CMNT-IMP: ABNORMAL
SODIUM SERPL-SCNC: 146 MMOL/L (ref 136–145)
SODIUM SERPL-SCNC: 152 MMOL/L (ref 136–145)
WBC # BLD AUTO: 20.7 K/UL (ref 3.6–11)

## 2023-05-23 PROCEDURE — 6370000000 HC RX 637 (ALT 250 FOR IP): Performed by: INTERNAL MEDICINE

## 2023-05-23 PROCEDURE — 85025 COMPLETE CBC W/AUTO DIFF WBC: CPT

## 2023-05-23 PROCEDURE — 84145 PROCALCITONIN (PCT): CPT

## 2023-05-23 PROCEDURE — 2580000003 HC RX 258: Performed by: STUDENT IN AN ORGANIZED HEALTH CARE EDUCATION/TRAINING PROGRAM

## 2023-05-23 PROCEDURE — 6360000002 HC RX W HCPCS: Performed by: STUDENT IN AN ORGANIZED HEALTH CARE EDUCATION/TRAINING PROGRAM

## 2023-05-23 PROCEDURE — 6360000002 HC RX W HCPCS: Performed by: EMERGENCY MEDICINE

## 2023-05-23 PROCEDURE — 6360000002 HC RX W HCPCS: Performed by: INTERNAL MEDICINE

## 2023-05-23 PROCEDURE — 6360000002 HC RX W HCPCS: Performed by: PSYCHIATRY & NEUROLOGY

## 2023-05-23 PROCEDURE — 2580000003 HC RX 258: Performed by: INTERNAL MEDICINE

## 2023-05-23 PROCEDURE — 94761 N-INVAS EAR/PLS OXIMETRY MLT: CPT

## 2023-05-23 PROCEDURE — 2580000003 HC RX 258: Performed by: NURSE PRACTITIONER

## 2023-05-23 PROCEDURE — 36415 COLL VENOUS BLD VENIPUNCTURE: CPT

## 2023-05-23 PROCEDURE — 2500000003 HC RX 250 WO HCPCS: Performed by: INTERNAL MEDICINE

## 2023-05-23 PROCEDURE — 2580000003 HC RX 258: Performed by: EMERGENCY MEDICINE

## 2023-05-23 PROCEDURE — 80053 COMPREHEN METABOLIC PANEL: CPT

## 2023-05-23 PROCEDURE — 94003 VENT MGMT INPAT SUBQ DAY: CPT

## 2023-05-23 RX ADMIN — LEVETIRACETAM 1000 MG: 100 INJECTION, SOLUTION INTRAVENOUS at 05:10

## 2023-05-23 RX ADMIN — DEXTROSE MONOHYDRATE: 50 INJECTION, SOLUTION INTRAVENOUS at 01:16

## 2023-05-23 RX ADMIN — AMPICILLIN SODIUM 2000 MG: 2 INJECTION, POWDER, FOR SOLUTION INTRAVENOUS at 03:59

## 2023-05-23 RX ADMIN — AMPICILLIN SODIUM 2000 MG: 2 INJECTION, POWDER, FOR SOLUTION INTRAVENOUS at 00:01

## 2023-05-23 RX ADMIN — VANCOMYCIN HYDROCHLORIDE 1250 MG: 1.25 INJECTION, POWDER, LYOPHILIZED, FOR SOLUTION INTRAVENOUS at 05:11

## 2023-05-23 RX ADMIN — CEFTRIAXONE SODIUM 2000 MG: 2 INJECTION, POWDER, FOR SOLUTION INTRAMUSCULAR; INTRAVENOUS at 04:01

## 2023-05-23 RX ADMIN — Medication 8 UNITS: at 06:19

## 2023-05-23 RX ADMIN — METRONIDAZOLE 500 MG: 500 INJECTION, SOLUTION INTRAVENOUS at 02:24

## 2023-05-23 RX ADMIN — Medication 8 UNITS: at 00:01

## 2023-05-23 RX ADMIN — DEXAMETHASONE SODIUM PHOSPHATE 10 MG: 10 INJECTION INTRAMUSCULAR; INTRAVENOUS at 04:01

## 2023-05-23 ASSESSMENT — PULMONARY FUNCTION TESTS
PIF_VALUE: 17
PIF_VALUE: 18

## 2023-05-23 ASSESSMENT — PAIN SCALES - GENERAL
PAINLEVEL_OUTOF10: 0
PAINLEVEL_OUTOF10: 0

## 2023-05-23 NOTE — PROGRESS NOTES
1930 - Bedside and Verbal shift change report given to Jana Foy  (oncoming nurse) by Melia Murillo (offgoing nurse). Report included the following information Nurse Handoff Report, ED Encounter Summary, ED SBAR, Adult Overview, Surgery Report, Intake/Output, MAR, Recent Results, Cardiac Rhythm nsr, Alarm Parameters, and Neuro Assessment. 2000 - Lifenet at the bedside. 2100 - NP jose enrique at beside for family discussion and plan of care moving forward.
1930: Bedside and Verbal shift change report given to Panoratio (oncoming nurse) by Malachi Simmons (offgoing nurse). Report included the following information Nurse Handoff Report, Intake/Output, MAR, Recent Results, Cardiac Rhythm NSR, Alarm Parameters, and Neuro Assessment. 0345: Sodium 167. Armando Velásquez NP notified. Orders received to stop NaCL with 40meq KCL, start dextrose 5%, and urine osmolality. 0445: Tube feeds started. 0800: Bedside and Verbal shift change report given to Fei Rehman (oncoming nurse) by Ute Wade RN (offgoing nurse). Report included the following information Nurse Handoff Report, ED Encounter Summary, Intake/Output, MAR, Recent Results, Cardiac Rhythm NSR, Alarm Parameters, and Neuro Assessment.
36 - Family discussion regarding NM scan and brain death. Family was able to speak with ID physician via phone on speaker regarding the patient's meningitis. All questions answered to satisfaction. Family is in route to hospital, coming from [place]. Will extubate in am at 0900. DNR placed in chart.     Sid [facility]
Accompanied patient's nurse, Amercio Azevedo, and ICU NP to talk with family of Ms Francheska Girard in [place]. Provided compassionate presence as NP informed family regarding the results of cerebral blood flow scan, EEG, and devastating prognosis; that patient had experienced brain death. Continued to provide emotional support as family engaged in active anticipatory grieving and assured them of prayers on behalf of patient and family. Chaplains will continue to be available for support as needed/requested. Deya Fonseca.  Taylor Kennedy, [facility]  To page : [phone])
BRAIN DEATH  Known cause and irreversible coma  Absent CNS depressant effect   No residual paralytic effect   Absence of breathing over the ventilator   No response in all 4 extremities to noxious stimuli, spinal reflexes may be present   Absence of facial muscle movement to noxious stimulus   Pupils non-reactive  Corneal reflex absent  Cough reflex to tracheal stimulation absent   Gag reflex to pharyngeal stimulation absent  Oculocephalic reflex (dolls eyes) absent, ensure cervical spine integrity   Oculovestibular reflex (cold caloric) absent    [facility] 10:51 AM b
Day #3 of Vancomycin - Level/Dosing Update  Indication:  Staph aureus bacteremia & meningitis   Current regimen:  1250 mg Q12H  Abx regimen:  Vanc+Amp+Ceftriaxone+Metronidazole  ID Following ?: YES  Concomitant nephrotoxic drugs (requires more frequent monitoring): Vasopressors  Frequency of BMP?: daily through     Recent Labs     23  1905 23  0142 23  0153 23  0255   WBC 10.6 12.4* PLEASE DISREGARD RESULTS 20.9*   CREATININE 0.58 0.66  --  1.03*   BUN 16 16  --  21*     Est CrCl: ~50-60 ml/min; UO: >1 ml/kg/hr  Temp (24hrs), Av °F (36.7 °C), Min:97 °F (36.1 °C), Max:98.9 °F (37.2 °C)    Cultures:    Blood #1: MSSA in 2/2 bottles, pending   Blood #2: MSSA in 2/2 bottles, pending   Biofire: Staph aureus, mecA/C resistance genes NOT detected   Urine: <10k no significant growth, final   CSF: heavy Staph aureus (negative for antigen detection), pending   Blood #1: NGTD   Blood #2: NGTD    MRSA Swab ordered (if applicable)? N/A    Goal target range AUC/KRISTA 400-600    Recent level history:  Date/Time Dose & Interval Measured Level (mcg/mL) Associated AUC/KRISTA Dose Adjustment     @ 0255 1250 mg IV Q12H 17.8 mcg/mL (~10.5 hr post-dose) 907 Change to 1250 mg IV Q24H                                         Plan: The random vancomycin level drawn this morning correlates with an AUC/KRISTA of 9072, which is much higher than the goal range. Plan will be to adjust the dose to 1250 mg IV Q 24 hr for an estimated AUC/KRISTA of 483. Pharmacy will continue to monitor patient daily and will make dosage adjustments based upon changing renal function. *Random vancomycin levels are used to calculate AUC/KRISTA, this level should not be interpreted as a trough.  Vancomycin has been dosed using Bayesian kinetics software to target an AUC24:KRISTA of 400-600, which provides adequate exposure for as assumed infection due to MRSA with an KRISTA of 1 or less while reducing the risk of
Infectious Disease Progress Note         Interval:  Worsening clinical status over the weekend    Subjective:   Spoke to family over the phone          Objective:    Vitals:   Reviewed in chart.     Physical Exam:  HEENT:  intubated  CV: HDS   Lungs: intubated  Genitourinary:   north catheter   Neuro: comatose            Labs:  Recent Results (from the past 24 hour(s))   POCT Glucose    Collection Time: 05/21/23 12:05 PM   Result Value Ref Range    POC Glucose 143 (H) 65 - 117 mg/dL    Performed by: Jennifer Yan    POCT Glucose    Collection Time: 05/21/23  5:23 PM   Result Value Ref Range    POC Glucose 172 (H) 65 - 117 mg/dL    Performed by: Jeninfer Yan    POCT Glucose    Collection Time: 05/21/23  8:23 PM   Result Value Ref Range    POC Glucose 173 (H) 65 - 117 mg/dL    Performed by: Jaylen Mishra    POCT Glucose    Collection Time: 05/22/23 12:01 AM   Result Value Ref Range    POC Glucose 179 (H) 65 - 117 mg/dL    Performed by: Jaylen Mishra    Vancomycin Level, Random    Collection Time: 05/22/23  1:53 AM   Result Value Ref Range    Vancomycin Rm PLEASE DISREGARD RESULTS UG/ML   CBC with Auto Differential    Collection Time: 05/22/23  1:53 AM   Result Value Ref Range    WBC PLEASE DISREGARD RESULTS 3.6 - 11.0 K/uL    RBC PLEASE DISREGARD RESULTS 3.80 - 5.20 M/uL    Hemoglobin PLEASE DISREGARD RESULTS 11.5 - 16.0 g/dL    Hematocrit PLEASE DISREGARD RESULTS 35.0 - 47.0 %    MCV Cannot be calculated 80.0 - 99.0 FL    MCH Cannot be calculated 26.0 - 34.0 PG    MCHC Cannot be calculated 30.0 - 36.5 g/dL    RDW PLEASE DISREGARD RESULTS 11.5 - 14.5 %    Platelets PLEASE DISREGARD RESULTS 150 - 400 K/uL    MPV PLEASE DISREGARD RESULTS 8.9 - 12.9 FL    Nucleated RBCs PLEASE DISREGARD RESULTS 0  WBC    nRBC PLEASE DISREGARD RESULTS 0.00 - 0.01 K/uL    Immature Granulocytes PLEASE DISREGARD RESULTS 0.0 - 0.5 %    Lymphocytes Absolute PLEASE DISREGARD RESULTS 0.8 - 3.5 K/UL    Monocytes Absolute PLEASE DISREGARD
[place] for testing to assess for blood flow to brain    11am neurology at bedside speaking with family at bedside    1250 EEG being completed at bedside    8 Colin Singh, nurse practitioner Sugar Andrade, and Erwin patterson at bedside as Sugar Andrade discussed results of nuclear med brain scan, EEG, and MRI. Family at bedside requesting some time to process such devastating news.      36 lifenet coordinator at bedside speaking with family    18 RN and Sugar Andrade (NP)  at bedside conferencing in infectious disease provider at bedside to discuss family's questions about meningitis     1900 lifenet at bedside again to discuss plan moving forward
Patient seen and examined. Imaging reviewed. Clinically thereIs no change. Pupils are fixed unreactive. No cough, no gag, no corneal reflex. Extremities flaccid. Reflexes mute. Cerebral blood flow scan reviewed. NO INTRACRANIAL BLOOD FLOW NOTED     A/P  Neurological examination and cerebral blood flow scan is consistent with brain death  Discussed with family who seem appropriately frustrated at the sequence of events. We discussed that any further diagnostic or treatment interventions are unlikely to change outcome.    I will remain available to answer any questions     Eh Koeing MD
Physician Progress Note      Neda Murphy  CSN #:                  [id number]  :                       1975  ADMIT DATE:       2023 1:17 PM  [address] [place] DATE:  RESPONDING  PROVIDER #:        Ernesto Ku NP          QUERY TEXT:    Pt admitted with Sepsis and bacterial meningitis, with documentation of hx of   Spinal fusion in 2022. ? If possible, please document in progress notes   and discharge summary the relationship if any between the bacterial meningitis   and the surgery: The medical record reflects the following:  Risk Factors: previous spinal fusion  Clinical Indicators:  90.7 107 (68), 15, 204/142  WBC-11.1 (20.9), B/C+ Staph Aureus, Lactic Acid 3    Tube Number, CSF ? 1  Color, CSF COL ? TAN COLONY? Abnormal?  CSF Spun Color COL ? YELLOW? Abnormal?  CSF Appearance CLEAR ? TURBID? Abnormal?  RBC, CSF 0 /cu mm 2750? High? WBC, CSF 0 - 5 /cu mm 61,445? High Panic? MRI L-spine outside facility:  1. ?Transitional anatomy. For the purposes study, the patient is status post  L5-S1 laminectomy and posterior fusion with 6 nonrib-bearing lumbar-type  vertebral bodies and lumbarization of S1.  2. ?Small fluid collection in the laminectomy bed. More anterior collection  demonstrates mild peripheral enhancement. These may still represent   postsurgical  changes although infection is not excluded. 3. ? There is suggestion of enhancement around the spinal cord in the lower  thoracic spine as well as enhancement of the distal radius which is concerning  for arachnoiditis and possibly meningitis. ER- L/P done, Spinal fluid purulent looking. Concern for meningitis. Severe   Sepsis    H&P- H&P- history of L spine fusion 2022 who had protracted recovery but in   past couple of months has been improving until 4 days PTA when she developed   excruciating lower back pain. Family also reports that she has had   intermittent nausea, subjective fevers, sweats and HA.  LP performed in
Plan to compassionately extubate today around 0900    0900 remaining out of town family members on their way, NP aware. Family visiting at bedside, allowing visitation from out of town family members prior to extubation.      1118 patient extubated, family, NP, RN, and RT at bedside
ROUTINE EEG COMPLETE.
Referral source:    was checking in on family for support. Ravindra Bermudez at [facility] in [facility]. I reviewed the medical record prior to this encounter. Spiritual Assessment:     Large family support was at bedside. We discussed current feelings/concerns and spiritual perspectives. The family reported that they were coping and shared about the shock they are feeling. I cultivated a relationship of support through expression of care; explored spiritual beliefs; emotional concerns; and coping strategies. I initiated patient-led spiritual care by helping patient recall times when their personal resilience and resources were effective.  proved active listening as family shared.  spoke with bedside Nurse and and Nurse Practitioner. Outcome: Ravindra Bermudez family expressed appreciation for pastoral support and the opportunity to share their thoughts and feelings. Plan of Care: Family  is aware of  availability and was encouraged to request   support as needed. Advised nurse to contact [facility] for any further referrals. The  on-call can be reached at [phone]). Rev.  Rosaura Richter MDiv, [facility]  Staff 
Referral source:   Alex Patel at [facility] in [address] [place] [address]. I reviewed the medical record prior to this encounter. Spiritual Assessment:     Paged for Family support by patient tech: family declined support. Plan of Care: Patient is aware of  availability and was encouraged to request support as needed. The  on-call can be reached at ([phone]).
Referral source:   Smith Aly at [facility] in [address] [place] [address]. I reviewed the medical record prior to this encounter. Spiritual Assessment:      provide family support  at time if extubation. Family clergy was at bedside and offending support as well. Outcome: Smith Aly family expressed appreciation for pastoral support and the opportunity to share their thoughts and feelings. Plan of Care: Family is aware of  availability and was encouraged to request   support as needed. Advised nurse to contact [facility] for any further referrals. The  on-call can be reached at [phone]). Rev.  Julio Layton MDiv, [facility]  Staff 
[facility]      Name: Jose Saavedra   : 1975   MRN: [id number]   Date: 2023      Brief patient summary (admission date ):  46 F smoker with history of L spine fusion 2022 who had protracted recovery but in past couple of months has been improving until 4 days PTA when she developed excruciating lower back pain. Family also reports that she has had intermittent nausea, subjective fevers, sweats and HA. She has been seen at an outside hospital ED 4 times in the past 4 days and most recently was discharged from the [facility] at 2 AM on the day of this admission after receiving hydromorphone. After returning home, she was noted by family members to be markedly confused and unable to speak. They called the surgeon covering for Dr Sergo Escoto (whoe performed her lumbar fusion) and he recommended that she come to [facility] ED. She was first evaluated as a \"code stroke\". She rapidly deteriorated while in the ED requiring intubation (reportedly did not require sedation). CT scans of head revealed findings consistent with cerebral edema. Emergent LP performed and CSF described as \"pus\". Initial GS on CSF with numerous WBCs and 2+ GPCs. MRI of brain ordered. Rapid EEG ordered. Exam in ED: pupils fixed and dilated, no spont movement, no spont resp effort over set vent rate    PRINCIPLE ICU DIAGNOSIS:  Severe sepsis due to bacterial meningitis    Hospital course/major results/changes:  : Admit to ICU. Neurology consultation. ID consultation  : LP indicative of bacterial meningitis. MRI brain with and without contrast this am. Still comatose. No neurological function present on exam. Apneic on vent and loss of pupillary/gag/cough reflexes as well.   : Nuclear brain scan today. Lines/tubes/devices:  ETT placed . CVL and arterial line placed night .    OGT     COMPREHENSIVE CCM ASSESSMENT/PLAN (by systems)     PULMONARY:  Vent dep resp failure - intubated for
to share at that time. Reassured them of continued prayers on their behalf and of ongoing  availability for support.  was updated by patient's nurse, Lisa Solares, regarding patient's status and POC. Izzy Garrison.  Anastasia Mclain, [facility]  To randall : [phone] ([phone])

## 2023-05-24 RX ORDER — FLUTICASONE PROPIONATE 50 MCG
SPRAY, SUSPENSION (ML) NASAL
Refills: 3 | OUTPATIENT
Start: 2023-05-24

## 2023-05-25 LAB
BACTERIA SPEC CULT: ABNORMAL
SERVICE CMNT-IMP: ABNORMAL
SERVICE CMNT-IMP: ABNORMAL

## 2023-06-08 RX ORDER — HYDROCHLOROTHIAZIDE 12.5 MG/1
TABLET ORAL
Qty: 30 TABLET | Refills: 0 | OUTPATIENT
Start: 2023-06-08